# Patient Record
Sex: FEMALE | Race: OTHER | HISPANIC OR LATINO | Employment: FULL TIME | URBAN - METROPOLITAN AREA
[De-identification: names, ages, dates, MRNs, and addresses within clinical notes are randomized per-mention and may not be internally consistent; named-entity substitution may affect disease eponyms.]

---

## 2017-01-12 ENCOUNTER — TELEPHONE (OUTPATIENT)
Dept: NEUROLOGY | Facility: MEDICAL CENTER | Age: 22
End: 2017-01-12

## 2017-01-12 NOTE — TELEPHONE ENCOUNTER
Pt called and left a voicemail to let us know that she has been fainting at least 4 times a week. I called back but no answer yet.  She has her EMU stay scheduled in March but want to know if you have and suggestions for her until then.     Please Advise    Thanks Nichole SOLOMON

## 2017-01-14 ENCOUNTER — HOSPITAL ENCOUNTER (EMERGENCY)
Facility: MEDICAL CENTER | Age: 22
End: 2017-01-14
Attending: EMERGENCY MEDICINE
Payer: COMMERCIAL

## 2017-01-14 ENCOUNTER — APPOINTMENT (OUTPATIENT)
Dept: RADIOLOGY | Facility: MEDICAL CENTER | Age: 22
End: 2017-01-14
Attending: EMERGENCY MEDICINE
Payer: COMMERCIAL

## 2017-01-14 VITALS
RESPIRATION RATE: 19 BRPM | SYSTOLIC BLOOD PRESSURE: 136 MMHG | BODY MASS INDEX: 39.96 KG/M2 | WEIGHT: 198.19 LBS | HEART RATE: 73 BPM | DIASTOLIC BLOOD PRESSURE: 94 MMHG | HEIGHT: 59 IN | TEMPERATURE: 98 F | OXYGEN SATURATION: 95 %

## 2017-01-14 DIAGNOSIS — R07.9 CHEST PAIN, UNSPECIFIED TYPE: ICD-10-CM

## 2017-01-14 PROCEDURE — 71020 DX-CHEST-2 VIEWS: CPT

## 2017-01-14 PROCEDURE — 93005 ELECTROCARDIOGRAM TRACING: CPT | Performed by: EMERGENCY MEDICINE

## 2017-01-14 PROCEDURE — 99284 EMERGENCY DEPT VISIT MOD MDM: CPT

## 2017-01-14 ASSESSMENT — LIFESTYLE VARIABLES: DO YOU DRINK ALCOHOL: YES

## 2017-01-14 NOTE — ED NOTES
Orin White  Chief Complaint   Patient presents with   • Shortness of Breath     started at approx 2100 after panic attack.     • Rib Pain     (R) side with inspiration.     Pt ambulatory to triage with above complaint.  VSS.   Pt returned to lobby, educated on triage process, and to inform staff of any changes or concerns.

## 2017-01-14 NOTE — ED NOTES
Pt given discharge instructions, follow up care. Pt verbalized understanding. RN to answer and questions pt and family had. VSS. Pt ambulated out to front lobby.

## 2017-01-14 NOTE — ED AVS SNAPSHOT
After Visit Summary                                                                                                                Orin White   MRN: [id number]    Department:  [facility], Emergency Dept   Date of Visit:  1/14/2017            [facility], Emergency Dept    [address] [address]    Phone:  [phone]      You were seen by     Alfonso Ramos M.D.      Your Diagnosis Was     Chest pain, unspecified type     R07.9       Follow-up Information     1. Follow up with Reanna Garvin M.D. In 2 days.    Specialty:  Family Medicine    Contact information    [address]  [address]  [facility] [address] [phone]          2. Follow up with [facility], Emergency Dept.    Specialty:  Emergency Medicine    Why:  If symptoms worsen    Contact information    [address] [address] [phone]      Medication Information     Review all of your home medications and newly ordered medications with your primary doctor and/or pharmacist as soon as possible. Follow medication instructions as directed by your doctor and/or pharmacist.     Please keep your complete medication list with you and share with your physician. Update the information when medications are discontinued, doses are changed, or new medications (including over-the-counter products) are added; and carry medication information at all times in the event of emergency situations.               Medication List      ASK your doctor about these medications        Instructions    diazepam 10 MG kit   Commonly known as:  DIASTAT-ACUDIAL    Insert 1 Kit in rectum 1 time daily as needed (for convulsions lasting 5 mins or more.).   Dose:  1 Kit       escitalopram 10 MG Tabs   Commonly known as:  LEXAPRO    Doctor's comments:  Take 1/2 tablet for the first 6 days, then increase to full tablet if tolerating   Take 0.5-1 Tabs by mouth every day.   Dose:  5-10 mg         LamoTRIgine 200 MG Tb24    Take 400 mg by mouth every day.   Dose:  400 mg       levetiracetam 500 MG Tabs   Commonly known as:  KEPPRA    Take 1 Tab by mouth 2 times a day.   Dose:  500 mg       Melatonin 10 MG Tbcr    Take 10 mg PE by mouth every bedtime.   Dose:  10 mg PE               Procedures and tests performed during your visit     DX-CHEST-2 VIEWS    EKG (ER)            Patient Information     Patient Information    Following emergency treatment: all patient requiring follow-up care must return either to a private physician or a clinic if your condition worsens before you are able to obtain further medical attention, please return to the emergency room.     Billing Information    At [facility], we work to make the billing process streamlined for our patients.  Our Representatives are here to answer any questions you may have regarding your hospital bill.  If you have insurance coverage and have supplied your insurance information to us, we will submit a claim to your insurer on your behalf.  Should you have any questions regarding your bill, we can be reached online or by phone as follows:  Online: You are able pay your bills online or live chat with our representatives about any billing questions you may have. We are here to help Monday - Friday from 8:00am to 7:30pm and 9:00am - 12:00pm on Saturdays.  Please visit [email or web address]  for more information.   Phone:  [phone] or [phone]    Please note that your emergency physician, surgeon, pathologist, radiologist, anesthesiologist, and other specialists are not employed by [facility] and will therefore bill separately for their services.  Please contact them directly for any questions concerning their bills at the numbers below:     Emergency Physician Services:  [phone]  [facility]:  [phone]  [facility]:  [phone]  [facility]:   [phone]    1. Your final bill may vary from the amount quoted upon discharge if all procedures are not complete at that time, or if your doctor has additional procedures of which we are not aware. You will receive an additional bill if you return to the Emergency Department at [facility] for suture removal regardless of the facility of which the sutures were placed.     2. Please arrange for settlement of this account at the emergency registration.    3. All self-pay accounts are due in full at the time of treatment.  If you are unable to meet this obligation then payment is expected within 4-5 days.     4. If you have had radiology studies (CT, X-ray, Ultrasound, MRI), you have received a preliminary result during your emergency department visit. Please contact the radiology department [phone] to receive a copy of your final result. Please discuss the Final result with your primary physician or with the follow up physician provided.     Crisis Hotline:  [place] Crisis Hotline:  7-735-ZQCFEAR or 1-235.367.6767  [place] Crisis Hotline:    1-591.691.8433 or 213-206-2113         ED Discharge Follow Up Questions    1. In order to provide you with very good care, we would like to follow up with a phone call in the next few days.  May we have your permission to contact you?     YES /  NO    2. What is the best phone number to call you? (       )_____-__________    3. What is the best time to call you?      Morning  /  Afternoon  /  Evening                   Patient Signature:  ____________________________________________________________    Date:  ____________________________________________________________      Your appointments     Feb 28, 2017  1:40 PM   Follow Up Visit with Mk Butterfield M.D.   [facility] (--)    [address], [address]  [facility] [address]   [phone]           You will be receiving a confirmation call a few days before your appointment from our automated call  confirmation system.

## 2017-01-14 NOTE — ED NOTES
EKG preformed bedside, all results back chart up for re-eval. Pt with no changes from previous assessments.

## 2017-01-14 NOTE — ED NOTES
Chief Complaint   Patient presents with   • Shortness of Breath     started at approx 2100 after panic attack.     • Rib Pain     (R) side with inspiration.     Pt placed on monitors, VSS. Ambulated back to room with no difficulty breathing or distress. Able to talk in full sentences. Pt placed on monitors, Chart up for ERP.

## 2017-01-14 NOTE — ED PROVIDER NOTES
"ED Provider Note        CHIEF COMPLAINT  Chief Complaint   Patient presents with   • Shortness of Breath     started at approx 2100 after panic attack.     • Rib Pain     (R) side with inspiration.       HPI  Orin White is a 21 y.o. female who presents to the Emergency Department with right-sided chest pain: Panic attack. She reports sharp stabbing pain over the right upper area of her chest is worse with deep inspiration. No alleviating or aggravating factors. Pain is currently at a 2 out of 10. Patient states multiple previous events similar fashion after having panic attacks over this event has lasted longer than usual concerning the patient. Patient currently has no shortness of breath she did have some palpitations at the onset. She's had no nausea no diaphoresis she has no abdominal pain . History of dysuria melena hematochezia. She's had no lower extremity pain or swelling. She does not smoke does not use oral contraceptives she does have an IUD in place. No long-term travel or periods of immobilization recently.    REVIEW OF SYSTEMS  10 systems reviewed and otherwise negative, pertinent positives and negatives listed in the history of present illness.       PAST MEDICAL HISTORY   has a past medical history of Seizure (CMS-[facility]); Epilepsy (CMS-[facility]); and Depression.    SURGICAL HISTORY  patient denies any surgical history    SOCIAL HISTORY  Social History   Substance Use Topics   • Smoking status: Never Smoker    • Smokeless tobacco: Never Used   • Alcohol Use: 0.0 oz/week     0 Standard drinks or equivalent per week      Comment: occ.      History   Drug Use No       FAMILY HISTORY  Non-contributory    CURRENT MEDICATIONS  Home Medications     **Home medications have not yet been reviewed for this encounter**          ALLERGIES  No Known Allergies    PHYSICAL EXAM  VITAL SIGNS: /94 mmHg  Pulse 73  Temp(Src) 36.7 °C (98 °F)  Resp 19  Ht 1.499 m (4' 11\")  Wt 89.9 kg (198 lb 3.1 oz)  BMI 40.01 " kg/m2  SpO2 95%  Constitutional: Alert and oriented x 3, no acute distress.  HENT: Normocephalic, Atraumatic, Bilateral external ears normal. Nose normal.   Eyes: Pupils are equal and reactive. Conjunctiva normal, non-icteric.   Heart: Regular rate and rythm, no murmurs, equal pulses peripherally x 4.    Lungs: Clear to auscultation bilaterally, no wheezes rales or rhonchi  GI: Soft nontender nondistended positive bowel sounds.  Skin: Warm, Dry, No erythema, No rash.   Neurologic: Cranial nerves III through XII grossly intact no sensory deficit no cerebellar dysfunction.   Psychiatric: Appropriate affect for situation    Sinus rhythm at a rate of 79 no ST elevation depression .  T-wave inversion in lead 3 minimal flattening of T waves in precordial leads. No acute change from prior examination    RADIOLOGY  DX-CHEST-2 VIEWS   Final Result      1.  Unremarkable two view chest.        The radiologist's interpretation of all radiological studies have been reviewed by me.    COURSE & MEDICAL DECISION MAKING  Nursing notes, VS, PMSFHx reviewed in chart.        Medical Decision Making: EKG does show flipped T-wave in lead 3 however this is improved from previous examination shows some flattening in the precordial leads this is also consistent from previous exam. Chest x-ray unremarkable pain relieved with no interventions. Patient's perk negative she is not hypoxic no tachycardia no pulses C tearing sensation or stigmata of aortic pathology on chest x-ray. Patient given instructions on anti-inflammatories. She'll return for any further chest pain or shortness of breath any other acute concerns otherwise follow-up with primary care. Discharged him stable condition.    Reanna Garvin M.D.  [address]  [address] [place] [address] [phone]    In 2 days      [facility], Emergency Dept  [address] [address] [phone]    If symptoms worsen  '      FINAL IMPRESSION  1.  Chest pain, unspecified type         This dictation has been created using voice recognition software and/or scribes. The accuracy of the dictation is limited by the abilities of the software and the expertise of the scribes. I expect there may be some errors of grammar and possibly content. I made every attempt to manually correct the errors within my dictation. However, errors related to voice recognition software and/or scribes may still exist and should be interpreted within the appropriate context.

## 2017-01-14 NOTE — ED AVS SNAPSHOT
1/14/2017          Orin White  [address]   [place] [address]    Dear Orin:    [facility] wants to ensure your discharge home is safe and you or your loved ones have had all your questions answered regarding your care after you leave the hospital.    You may receive a telephone call within two days of your discharge.  This call is to make certain you understand your discharge instructions as well as ensure we provided you with the best care possible during your stay with us.     The call will only last approximately 3-5 minutes and will be done by a nurse.    Once again, we want to ensure your discharge home is safe and that you have a clear understanding of any next steps in your care.  If you have any questions or concerns, please do not hesitate to contact us, we are here for you.  Thank you for choosing [facility] for your healthcare needs.    Sincerely,    Wisam Dong    [facility]

## 2017-01-14 NOTE — ED AVS SNAPSHOT
[facility] Access Code: Activation code not generated  Current [facility] Status: Active    Mytrushart  A secure, online tool to manage your health information     [employer]’s [facility]® is a secure, online tool that connects you to your personalized health information from the privacy of your home -- day or night - making it very easy for you to manage your healthcare. Once the activation process is completed, you can even access your medical information using the [facility] janina, which is available for free in the Apple Janina store or Google Play store.     [facility] provides the following levels of access (as shown below):   My Chart Features   [facility] Primary Care Doctor [facility]  Specialists [facility]  Urgent  Care Non-[facility]  Primary Care  Doctor   Email your healthcare team securely and privately 24/7 X X X X   Manage appointments: schedule your next appointment; view details of past/upcoming appointments X      Request prescription refills. X      View recent personal medical records, including lab and immunizations X X X X   View health record, including health history, allergies, medications X X X X   Read reports about your outpatient visits, procedures, consult and ER notes X X X X   See your discharge summary, which is a recap of your hospital and/or ER visit that includes your diagnosis, lab results, and care plan. X X       How to register for [facility]:  1. Go to  [email or web address].  2. Click on the Sign Up Now box, which takes you to the New Member Sign Up page. You will need to provide the following information:  a. Enter your [facility] Access Code exactly as it appears at the top of this page. (You will not need to use this code after you’ve completed the sign-up process. If you do not sign up before the expiration date, you must request a new code.)   b. Enter your date of birth.   c. Enter your home email address.   d. Click Submit, and follow the next screen’s instructions.  3. Create a [facility] ID. This will  be your [facility] login ID and cannot be changed, so think of one that is secure and easy to remember.  4. Create a [facility] password. You can change your password at any time.  5. Enter your Password Reset Question and Answer. This can be used at a later time if you forget your password.   6. Enter your e-mail address. This allows you to receive e-mail notifications when new information is available in [facility].  7. Click Sign Up. You can now view your health information.    For assistance activating your [facility] account, call [phone]

## 2017-01-17 ENCOUNTER — TELEPHONE (OUTPATIENT)
Dept: NEUROLOGY | Facility: MEDICAL CENTER | Age: 22
End: 2017-01-17

## 2017-01-17 NOTE — TELEPHONE ENCOUNTER
Pt called and left Vm on 1/17/16. I tried calling back no answer. Pt states she had another fainting spell and wanted to inform us. She is scheduled for EMU on 2/6/17.     Thanks Nichole SOLOMON

## 2017-01-18 LAB — EKG IMPRESSION: NORMAL

## 2017-02-02 ENCOUNTER — TELEPHONE (OUTPATIENT)
Dept: NEUROLOGY | Facility: MEDICAL CENTER | Age: 22
End: 2017-02-02

## 2017-02-02 NOTE — TELEPHONE ENCOUNTER
[facility] approved 1 day for EMU admission #[id number]. Spoke with Orin and confirmed she will be here Monday 2/6/17.

## 2017-02-02 NOTE — PROGRESS NOTES
Direct admit from Dr. Butterfield for EEG monitoring for Epilepsy. ADT orders singed and held 02/02/2017 at 1555 will need to be released upon patient arrival to unit. Orders faxed to neuro surgery and scanned into [facility].

## 2017-02-06 ENCOUNTER — HOSPITAL ENCOUNTER (INPATIENT)
Facility: MEDICAL CENTER | Age: 22
LOS: 4 days | DRG: 101 | End: 2017-02-10
Attending: PSYCHIATRY & NEUROLOGY | Admitting: PSYCHIATRY & NEUROLOGY
Payer: COMMERCIAL

## 2017-02-06 DIAGNOSIS — R55 SYNCOPE AND COLLAPSE: Chronic | ICD-10-CM

## 2017-02-06 PROCEDURE — 700111 HCHG RX REV CODE 636 W/ 250 OVERRIDE (IP): Performed by: PSYCHIATRY & NEUROLOGY

## 2017-02-06 PROCEDURE — 700102 HCHG RX REV CODE 250 W/ 637 OVERRIDE(OP): Performed by: PSYCHIATRY & NEUROLOGY

## 2017-02-06 PROCEDURE — 770020 HCHG ROOM/CARE - TELE (206)

## 2017-02-06 PROCEDURE — 95951 EEG: CPT

## 2017-02-06 PROCEDURE — A9270 NON-COVERED ITEM OR SERVICE: HCPCS | Performed by: PSYCHIATRY & NEUROLOGY

## 2017-02-06 RX ORDER — LEVETIRACETAM 500 MG/1
500 TABLET ORAL 2 TIMES DAILY
Status: DISCONTINUED | OUTPATIENT
Start: 2017-02-06 | End: 2017-02-06

## 2017-02-06 RX ORDER — ESCITALOPRAM OXALATE 10 MG/1
10 TABLET ORAL DAILY
Status: DISCONTINUED | OUTPATIENT
Start: 2017-02-06 | End: 2017-02-10 | Stop reason: HOSPADM

## 2017-02-06 RX ORDER — SAW/PYGEUM/BETA/HERB/D3/B6/ZN 30 MG-25MG
10 CAPSULE ORAL
Status: DISCONTINUED | OUTPATIENT
Start: 2017-02-06 | End: 2017-02-06

## 2017-02-06 RX ORDER — IBUPROFEN 200 MG
400 TABLET ORAL EVERY 6 HOURS PRN
COMMUNITY
End: 2018-07-10

## 2017-02-06 RX ORDER — AMOXICILLIN 500 MG/1
500 CAPSULE ORAL 2 TIMES DAILY
Status: ON HOLD | COMMUNITY
End: 2017-02-10

## 2017-02-06 RX ORDER — LAMOTRIGINE 100 MG/1
200 TABLET ORAL 2 TIMES DAILY
Status: DISCONTINUED | OUTPATIENT
Start: 2017-02-06 | End: 2017-02-10 | Stop reason: HOSPADM

## 2017-02-06 RX ORDER — ESCITALOPRAM OXALATE 10 MG/1
10 TABLET ORAL DAILY
COMMUNITY
End: 2018-07-10

## 2017-02-06 RX ORDER — LORAZEPAM 2 MG/ML
1 INJECTION INTRAMUSCULAR
Status: DISCONTINUED | OUTPATIENT
Start: 2017-02-06 | End: 2017-02-10 | Stop reason: HOSPADM

## 2017-02-06 RX ORDER — LAMOTRIGINE 200 MG/1
400 TABLET, EXTENDED RELEASE ORAL DAILY
Status: DISCONTINUED | OUTPATIENT
Start: 2017-02-06 | End: 2017-02-06

## 2017-02-06 RX ADMIN — LAMOTRIGINE 200 MG: 100 TABLET ORAL at 20:46

## 2017-02-06 RX ADMIN — ESCITALOPRAM OXALATE 10 MG: 10 TABLET ORAL at 09:40

## 2017-02-06 RX ADMIN — LAMOTRIGINE 200 MG: 100 TABLET ORAL at 09:40

## 2017-02-06 RX ADMIN — ENOXAPARIN SODIUM 40 MG: 100 INJECTION SUBCUTANEOUS at 09:40

## 2017-02-06 ASSESSMENT — PAIN SCALES - GENERAL
PAINLEVEL_OUTOF10: 0
PAINLEVEL_OUTOF10: 0

## 2017-02-06 ASSESSMENT — LIFESTYLE VARIABLES
CONSUMPTION TOTAL: NEGATIVE
EVER HAD A DRINK FIRST THING IN THE MORNING TO STEADY YOUR NERVES TO GET RID OF A HANGOVER: NO
EVER FELT BAD OR GUILTY ABOUT YOUR DRINKING: NO
HAVE PEOPLE ANNOYED YOU BY CRITICIZING YOUR DRINKING: NO
AVERAGE NUMBER OF DAYS PER WEEK YOU HAVE A DRINK CONTAINING ALCOHOL: 1
ON A TYPICAL DAY WHEN YOU DRINK ALCOHOL HOW MANY DRINKS DO YOU HAVE: 1
HOW MANY TIMES IN THE PAST YEAR HAVE YOU HAD 5 OR MORE DRINKS IN A DAY: 0
HAVE YOU EVER FELT YOU SHOULD CUT DOWN ON YOUR DRINKING: NO
TOTAL SCORE: 0
ALCOHOL_USE: YES
TOTAL SCORE: 0
EVER_SMOKED: NEVER
TOTAL SCORE: 0

## 2017-02-06 ASSESSMENT — PATIENT HEALTH QUESTIONNAIRE - PHQ9
1. LITTLE INTEREST OR PLEASURE IN DOING THINGS: NOT AT ALL
SUM OF ALL RESPONSES TO PHQ QUESTIONS 1-9: 0
SUM OF ALL RESPONSES TO PHQ9 QUESTIONS 1 AND 2: 0
2. FEELING DOWN, DEPRESSED, IRRITABLE, OR HOPELESS: NOT AT ALL

## 2017-02-06 NOTE — PROGRESS NOTES
Attention seizure pads to pt's bed rails. Neuro sciences unit will need to provide from there own supply as we do not carry this item.

## 2017-02-06 NOTE — IP AVS SNAPSHOT
" Home Care Instructions                                                                                                                  Name:Orin White  Medical Record Number:[id number]  CSN: [id number]    YOB: 1995   Age: 21 y.o.  Sex: female  HT:1.499 m (4' 11\") WT: 86.183 kg (190 lb)          Admit Date: 2/6/2017     Discharge Date:   Today's Date: 2/10/2017  Attending Doctor:  Mk Butterfield M.D.                  Allergies:  Review of patient's allergies indicates no known allergies.            Discharge Instructions       Discharge Instructions    Discharged to home by car with relative. Discharged via walking, hospital escort: Refused.  Special equipment needed: Not Applicable    Be sure to schedule a follow-up appointment with your primary care doctor or any specialists as instructed.     Discharge Plan:   Diet Plan: Discussed  Activity Level: Discussed  Confirmed Follow up Appointment: Patient to Call and Schedule Appointment  Confirmed Symptoms Management: Discussed  Medication Reconciliation Updated: Yes  Influenza Vaccine Indication: Patient Refuses    I understand that a diet low in cholesterol, fat, and sodium is recommended for good health. Unless I have been given specific instructions below for another diet, I accept this instruction as my diet prescription.   Other diet: Regular    Special Instructions: None    · Is patient discharged on Warfarin / Coumadin?   No     · Is patient Post Blood Transfusion?  No    Depression / Suicide Risk    As you are discharged from this [facility] facility, it is important to learn how to keep safe from harming yourself.    Recognize the warning signs:  · Abrupt changes in personality, positive or negative- including increase in energy   · Giving away possessions  · Change in eating patterns- significant weight changes-  positive or negative  · Change in sleeping patterns- unable to sleep or sleeping all the time   · Unwillingness or inability to " communicate  · Depression  · Unusual sadness, discouragement and loneliness  · Talk of wanting to die  · Neglect of personal appearance   · Rebelliousness- reckless behavior  · Withdrawal from people/activities they love  · Confusion- inability to concentrate     If you or a loved one observes any of these behaviors or has concerns about self-harm, here's what you can do:  · Talk about it- your feelings and reasons for harming yourself  · Remove any means that you might use to hurt yourself (examples: pills, rope, extension cords, firearm)  · Get professional help from the community (Mental Health, Substance Abuse, psychological counseling)  · Do not be alone:Call your Safe Contact- someone whom you trust who will be there for you.  · Call your local CRISIS HOTLINE 016-5436 or 844-557-4781  · Call your local Children's Mobile Crisis Response Team [place] (209) 279-3900 or www.[employer]ch  · Call the toll free National Suicide Prevention Hotlines   · National Suicide Prevention Lifeline 250-832-XVVY (6277)  · [employer] Line Network 800-SUICIDE (990-5047)        Your appointments     Feb 28, 2017  1:40 PM   Follow Up Visit with Mk Butterfield M.D.   [facility] (--)    [address], [address]  [facility] [address] [phone]           You will be receiving a confirmation call a few days before your appointment from our automated call confirmation system.                 Discharge Medication Instructions:    Below are the medications your physician expects you to take upon discharge:    Review all your home medications and newly ordered medications with your doctor and/or pharmacist. Follow medication instructions as directed by your doctor and/or pharmacist.    Please keep your medication list with you and share with your physician.               Medication List      START taking these medications        Instructions    gabapentin 300 MG Caps   Last time this was given:  300 mg on  2/9/2017  7:45 PM   Commonly known as:  NEURONTIN   Next Dose Due:  Friday, February 10, 2017 PM    Take 1 Cap by mouth every evening.   Dose:  300 mg       vitamin D (Ergocalciferol) 05539 UNITS Caps capsule   Last time this was given:  50,000 Units on 2/9/2017  9:24 AM   Commonly known as:  DRISDOL   Next Dose Due:  Thursday, February 17, 2017    Take 1 Cap by mouth every 7 days.   Dose:  52842 Units         CONTINUE taking these medications        Instructions    escitalopram 10 MG Tabs   Last time this was given:  10 mg on 2/10/2017 10:11 AM   Commonly known as:  LEXAPRO   Next Dose Due:  Saturday, February 11, 2017 AM    Take 10 mg by mouth every day.   Dose:  10 mg       ibuprofen 200 MG Tabs   Commonly known as:  MOTRIN    Take 400 mg by mouth every 6 hours as needed for Mild Pain.   Dose:  400 mg       LamoTRIgine 200 MG Tb24   Next Dose Due:  Saturday, February 11, 2017 AM    Take 400 mg by mouth every day.   Dose:  400 mg       Melatonin 10 MG Tbcr    Take 10 mg PE by mouth every bedtime.   Dose:  10 mg PE         STOP taking these medications     amoxicillin 500 MG Caps   Commonly known as:  AMOXIL       diazepam 10 MG kit   Commonly known as:  DIASTAT-ACUDIAL       levetiracetam 500 MG Tabs   Commonly known as:  KEPPRA               Instructions           Diet / Nutrition:    Follow any diet instructions given to you by your doctor or the dietician, including how much salt (sodium) you are allowed each day.    If you are overweight, talk to your doctor about a weight reduction plan.    Activity:    Remain physically active following your doctor's instructions about exercise and activity.    Rest often.     Any time you become even a little tired or short of breath, SIT DOWN and rest.    Worsening Symptoms:    Report any of the following signs and symptoms to the doctor's office immediately:    *Pain of jaw, arm, or neck  *Chest pain not relieved by medication                               *Dizziness or  loss of consciousness  *Difficulty breathing even when at rest   *More tired than usual                                       *Bleeding drainage or swelling of surgical site  *Swelling of feet, ankles, legs or stomach                 *Fever (>100ºF)  *Pink or blood tinged sputum  *Weight gain (3lbs/day or 5lbs /week)           *Shock from internal defibrillator (if applicable)  *Palpitations or irregular heartbeats                *Cool and/or numb extremities    Stroke Awareness    Common Risk Factors for Stroke include:    Age  Atrial Fibrillation  Carotid Artery Stenosis  Diabetes Mellitus  Excessive alcohol consumption  High blood pressure  Overweight   Physical inactivity  Smoking    Warning signs and symptoms of a stroke include:    *Sudden numbness or weakness of the face, arm or leg (especially on one side of the body).  *Sudden confusion, trouble speaking or understanding.  *Sudden trouble seeing in one or both eyes.  *Sudden trouble walking, dizziness, loss of balance or coordination.Sudden severe headache with no known cause.    It is very important to get treatment quickly when a stroke occurs. If you experience any of the above warning signs, call 911 immediately.                   Disclaimer         Quit Smoking / Tobacco Use:    I understand the use of any tobacco products increases my chance of suffering from future heart disease or stroke and could cause other illnesses which may shorten my life. Quitting the use of tobacco products is the single most important thing I can do to improve my health. For further information on smoking / tobacco cessation call a Toll Free Quit Line at 1-184.584.1508 (*National Cancer [place]) or 1-395.474.3970 (American Lung Association) or you can access the web based program at www.lungusa.org.    [place] Tobacco Users Help Line:  (284) 795-3494       Toll Free: 1-756.292.9666  Quit Tobacco Program [facility] ([phone]    Crisis  Hotline:    National Crisis Hotline:  8-684-AXVLESP or 1-574.864.6598    [place] Crisis Hotline:    1-904.383.7190 or 352-728-8281    Discharge Survey:   Thank you for choosing [facility]. We hope we did everything we could to make your hospital stay a pleasant one. You may be receiving a phone survey and we would appreciate your time and participation in answering the questions. Your input is very valuable to us in our efforts to improve our service to our patients and their families.        My signature on this form indicates that:    1. I have reviewed and understand the above information.  2. My questions regarding this information have been answered to my satisfaction.  3. I have formulated a plan with my discharge nurse to obtain my prescribed medications for home.                  Disclaimer         __________________________________                     __________       ________                       Patient Signature                                                 Date                    Time

## 2017-02-06 NOTE — EEG PROGRESS NOTE
VIDEO ELECTROENCEPHALOGRAM / EPILEPSY MONITORING UNIT REPORT        DOS:   2/6/2017 - 2/10/2017      INDICATION:  Orin White 21 y.o. female presenting with recurrent seizures.     CURRENT ANTIEPILEPTIC REGIMEN: Lamotrigine  mg po daily, Levetiracetam 500 mg po bid.      TECHNIQUE: A 30-channel, 5 days video electroencephalogram (VEEG) was performed in accordance with the international 10-20 system. This digital study was reviewed in bipolar and referential montages. The recording examined the patient during wakeful, drowsy and sleep states.     The patient was self-sleep deprived to 4 hrs or less of sleep: every night during her admission.     There was supervised withdrawal of the following medications: Levetiracetam was held during her admission and ultimately discontinued.       DESCRIPTION OF THE RECORD:  During the awake state, background shows symmetrical 10 Hz alpha activity posteriorly with amplitude of 70 mV.  There was reactivity with eye opening/closure.  Normal anterior-posterior gradient was noted with faster beta frequencies seen anteriorly.  During drowsiness, high-amplitude delta frequencies were seen.    During the sleep state, background shows diffuse high-amplitude 4-5 Hz delta activity.  Symmetrical high-amplitude sleep spindles and vertex sharp activities were seen in the central leads.    ACTIVATION PROCEDURES:   Hyperventilation was performed by the patient for a total of 3 minutes. The technician performing the test noted good effort. This technique produced electroencephalographic changes in keeping with mild bilaterally synchronous, frontally predominan build up.     Intermittent Photic stimulation was performed in a stepwise fashion from 1 to 30 Hz and elicited a normal response (photic driving), most noticeable in the posterior leads.      ICTAL AND/OR INTERICTAL FINDINGS:   No focal or generalized epileptiform activity was noted. No regional slowing was seen during this  routine study.  No seizures were recording.     EVENT(S):  No clinical events.     EKG: sampling review of EKG recording demonstrated sinus rhythm.       INTERPRETATION:   This is a normal 5 days video electroencephalogram recording in the awake, drowsy and sleep state.  No events were captured during the study. Clinical correlation is recommended.    Note: A normal electroencephalogram does not rule out epilepsy.       Mk Butterfield MD  Diplomate in Neurology, Epilepsy, and Electrodiagnostic Medicine.

## 2017-02-06 NOTE — IP AVS SNAPSHOT
Loopback Access Code: Activation code not generated  Current Loopback Status: Active    [email or web address]  A secure, online tool to manage your health information     [employer]’s Loopback® is a secure, online tool that connects you to your personalized health information from the privacy of your home -- day or night - making it very easy for you to manage your healthcare. Once the activation process is completed, you can even access your medical information using the Loopback janina, which is available for free in the Apple Janina store or Google Play store.     Loopback provides the following levels of access (as shown below):   My Chart Features   [facility] Primary Care Doctor [facility]  Specialists [facility]  Urgent  Care Non-[facility]  Primary Care  Doctor   Email your healthcare team securely and privately 24/7 X X X X   Manage appointments: schedule your next appointment; view details of past/upcoming appointments X      Request prescription refills. X      View recent personal medical records, including lab and immunizations X X X X   View health record, including health history, allergies, medications X X X X   Read reports about your outpatient visits, procedures, consult and ER notes X X X X   See your discharge summary, which is a recap of your hospital and/or ER visit that includes your diagnosis, lab results, and care plan. X X       How to register for Loopback:  1. Go to  [email or web address].  2. Click on the Sign Up Now box, which takes you to the New Member Sign Up page. You will need to provide the following information:  a. Enter your Loopback Access Code exactly as it appears at the top of this page. (You will not need to use this code after you’ve completed the sign-up process. If you do not sign up before the expiration date, you must request a new code.)   b. Enter your date of birth.   c. Enter your home email address.   d. Click Submit, and follow the next screen’s instructions.  3. Create a Loopback ID. This will  be your LinkStorm login ID and cannot be changed, so think of one that is secure and easy to remember.  4. Create a LinkStorm password. You can change your password at any time.  5. Enter your Password Reset Question and Answer. This can be used at a later time if you forget your password.   6. Enter your e-mail address. This allows you to receive e-mail notifications when new information is available in LinkStorm.  7. Click Sign Up. You can now view your health information.    For assistance activating your LinkStorm account, call [phone]

## 2017-02-06 NOTE — IP AVS SNAPSHOT
" <p align=\"LEFT\"><IMG SRC=\"//[email or web address]$/[email or web address]\" alt=\"Image\" WIDTH=\"50%\" HEIGHT=\"200\" BORDER=\"\"></p>                   Name:Orin White  Medical Record Number:[id number]  CSN: [id number]    YOB: 1995   Age: 21 y.o.  Sex: female  HT:1.499 m (4' 11\") WT: 86.183 kg (190 lb)          Admit Date: 2/6/2017     Discharge Date:   Today's Date: 2/10/2017  Attending Doctor:  Mk Butterfield M.D.                  Allergies:  Review of patient's allergies indicates no known allergies.          Your appointments     Feb 28, 2017  1:40 PM   Follow Up Visit with Mk Butterfield M.D.   [facility] (--)    [address] [place] [address], [address]  [facility] [address] [phone]           You will be receiving a confirmation call a few days before your appointment from our automated call confirmation system.                 Medication List      Take these Medications        Instructions    escitalopram 10 MG Tabs   Commonly known as:  LEXAPRO    Take 10 mg by mouth every day.   Dose:  10 mg       gabapentin 300 MG Caps   Commonly known as:  NEURONTIN    Take 1 Cap by mouth every evening.   Dose:  300 mg       ibuprofen 200 MG Tabs   Commonly known as:  MOTRIN    Take 400 mg by mouth every 6 hours as needed for Mild Pain.   Dose:  400 mg       LamoTRIgine 200 MG Tb24    Take 400 mg by mouth every day.   Dose:  400 mg       Melatonin 10 MG Tbcr    Take 10 mg PE by mouth every bedtime.   Dose:  10 mg PE       vitamin D (Ergocalciferol) 75534 UNITS Caps capsule   Commonly known as:  DRISDOL    Take 1 Cap by mouth every 7 days.   Dose:  24687 Units         "

## 2017-02-06 NOTE — PROGRESS NOTES
Pt aaox4. , tele monitor, continuous EEG in use. Up w/ SBA, steady gait to BR, voiding w/o difficulty. Pt denies pain. Good appetite. Reviewed poc with pt-verbalized understanding. Bed alarm in use. Call light in reach. Seizure precautions in place.

## 2017-02-06 NOTE — PROCEDURES
VIDEO ELECTROENCEPHALOGRAM / EPILEPSY MONITORING UNIT REPORT        DOS:   2/6/2017 - 2/10/2017    INDICATION:  Orin White 21 y.o. female presenting with recurrent seizures.     CURRENT ANTIEPILEPTIC REGIMEN: Lamotrigine  mg po daily, Levetiracetam 500 mg po bid.       TECHNIQUE: A 30-channel, 5 days video electroencephalogram (VEEG) was performed in accordance with the international 10-20 system. This digital study was reviewed in bipolar and referential montages. The recording examined the patient during wakeful, drowsy and sleep states.     The patient was self-sleep deprived to 4 hrs or less of sleep: every night during her admission.     There was supervised withdrawal of the following medications: Levetiracetam was held during her admission and ultimately discontinued.       DESCRIPTION OF THE RECORD:  During the awake state, background shows symmetrical 10 Hz alpha activity posteriorly with amplitude of 70 mV.  There was reactivity with eye opening/closure.  Normal anterior-posterior gradient was noted with faster beta frequencies seen anteriorly.  During drowsiness, high-amplitude delta frequencies were seen.    During the sleep state, background shows diffuse high-amplitude 4-5 Hz delta activity.  Symmetrical high-amplitude sleep spindles and vertex sharp activities were seen in the central leads.    ACTIVATION PROCEDURES:    Hyperventilation was performed by the patient for a total of 3 minutes. The technician performing the test noted good effort. This technique produced electroencephalographic changes in keeping with mild bilaterally synchronous, frontally predominan build up.     Intermittent Photic stimulation was performed in a stepwise fashion from 1 to 30 Hz and elicited a normal response (photic driving), most noticeable in the posterior leads.      ICTAL AND/OR INTERICTAL FINDINGS:    No focal or generalized epileptiform activity was noted. No regional slowing was seen during this  routine study.  No seizures were recording.      EVENT(S):  No clinical events.     EKG: sampling review of EKG recording demonstrated sinus rhythm.       INTERPRETATION:   This is a normal 5 days video electroencephalogram recording in the awake, drowsy and sleep state.  No events were captured during the study, despite sleep deprivation and holding of levetiracetam throughout her admission. Clinical correlation is recommended.    Note: A normal electroencephalogram does not rule out epilepsy.       Mk Butterfield MD  Diplomate in Neurology, Epilepsy, and Electrodiagnostic Medicine.     RAFAELA / JODY    DD:  02/06/2017 06:56:31  DT:  02/06/2017 09:15:16    D#:  [id number]  Job#:  [id number]

## 2017-02-06 NOTE — DIETARY
Nutrition note  Patient with good appetite and po intake BMi is 38   Weight loss would be of benefit for patient   Out patient education on healthy weight loss program may benefit patient   RD will continue to follow as needed

## 2017-02-06 NOTE — CARE PLAN
Problem: Safety  Goal: Will remain free from falls  Intervention: Implement fall precautions  Bed alarm in use. Call light w/in reach. Instructed pt to call for assistance.      Problem: Knowledge Deficit  Goal: Knowledge of the prescribed therapeutic regimen will improve  Intervention: Discuss information regarding therpeutic regimen and document in education  POC discussed with pt- verbalized understanding.

## 2017-02-06 NOTE — PROGRESS NOTES
Med rec complete per patient.  Patient completed a 7 day course of amoxicillin > 1 week ago.  Allergies reviewed.

## 2017-02-06 NOTE — IP AVS SNAPSHOT
2/10/2017          Orin White  [address]   [place] [address]    Dear Orin:    [facility] wants to ensure your discharge home is safe and you or your loved ones have had all your questions answered regarding your care after you leave the hospital.    You may receive a telephone call within two days of your discharge.  This call is to make certain you understand your discharge instructions as well as ensure we provided you with the best care possible during your stay with us.     The call will only last approximately 3-5 minutes and will be done by a nurse.    Once again, we want to ensure your discharge home is safe and that you have a clear understanding of any next steps in your care.  If you have any questions or concerns, please do not hesitate to contact us, we are here for you.  Thank you for choosing [facility] for your healthcare needs.    Sincerely,    Wisam Dong    [facility]

## 2017-02-06 NOTE — H&P
Cc: Seizures.         History of present illness:  Orin White 21 y.o. female presented to my office for evaluation of seizures. Out of the blue, she started having seizures in October of 2015. She has had at least 10 GTC seizures. Seizures are preceded by an abnormal but pleasant feeling of slow motion and distorted reality. She has two types of events, one where she may be only confused for a min or so and one that progresses to a GTC seizure. These have been witnessed by family. Eyes rolled back, bites her tongue, generalized jerks, unresponsive. Convulsions may last sometimes for up to 5 mins. She is post ictal after her seizures for about 10-15 mins with a slow progressive recovery.     She denies h/o TBI, stroke, MS or childhood seizures or epilepsy.     She has amenorrhea for the last two years and was checked for POS and was inconclusive. She does not use protection with her fiance but does not necessarily want to become pregnant. Seizures not necessarily around where her period would have fallen.     After her first seizure, she was started on Keppra 500 mg po bid and remains with same dose, despite having had more seizures. Her last seizures were July 8, 2016, had two seizures in one day and was seen in ED. Since started on Keppra, has been anxious and somewhat battling depression. Was subsequently placed on Lexapro. There is a family h/o depression, she would like to come off lexapro if possible.     Does not use drugs or drinks alcohol. Denies sleep deprivation.     Her last reported breakthrough seizure was2 weeks ago. She felt lightheaded, spaced out, collapsed backwards, was witnessed by her father, who told her she had a convulsion. She bit her tongue but did not have incontinence, was very tired and confused.     She is not driving.     We is currently on lamictal  mg daily in am, she is also on keppra 500 mg po bid.     Mood is improved with lamictal, denies being depressed.     She is  scheduled with her gyn to have a bare metal IUD implanted soon.       Seizure onset: 2015    Seizure semiology: Aura, confusion, GTC.      Seizure types: Focal onset, progression to GTC.      Past AED’s: None.      Current AED regimen: Keppra 500 mg po bid, lamictal  mg po daily in am.      Prior neurologists: no.      Prior EEG’s: yes.     Prior brain imaging: yes.       Driving status: not driving.      Past medical history:   Past Medical History   Diagnosis Date   • Seizure (CMS-HCC)      since October   • Epilepsy (CMS-HCC)    • Depression        Past surgical history:   No past surgical history on file.    Family history:   Family History   Problem Relation Age of Onset   • Diabetes Maternal Grandmother        Social history:   Social History     Social History   • Marital Status: Single     Spouse Name: N/A   • Number of Children: N/A   • Years of Education: N/A     Occupational History   • Not on file.     Social History Main Topics   • Smoking status: Never Smoker    • Smokeless tobacco: Never Used   • Alcohol Use: 0.0 oz/week     0 Standard drinks or equivalent per week      Comment: occ.   • Drug Use: No   • Sexual Activity: Not Currently     Other Topics Concern   • Not on file     Social History Narrative    ** Merged History Encounter **         [employer] -senior    Lives with Mom, sisters, brother               Current medications:   Current Facility-Administered Medications   Medication Dose   • escitalopram (LEXAPRO) tablet 5-10 mg  5-10 mg   • LamoTRIgine TB24 400 mg  400 mg   • levetiracetam (KEPPRA) tablet 500 mg  500 mg   • Melatonin TBCR 10 mg PE  10 mg PE   • lorazepam (ATIVAN) injection 1 mg  1 mg       Medication Allergy:  No Known Allergies    Review of systems:   Constitutional: denies fever, night sweats, weight loss, or malaise/fatigue.    Eyes: denies acute vision change, eye pain or secretion.    Ears, Nose, Mouth, Throat: denies nasal secretion, nasal bleeding, difficulty  "swallowing, hearing loss, tinnitus, vertigo, ear pain, oral ulcers or lesions.    Endocrine: denies recent weight changes, heat or cold intolerance, polyuria, polydypsia, polyphagia,abnormal hair growth.  Cardiovascular: denies new onset of chest pain, palpitations, syncope, lower extremity edema, or dyspnea of exertion.  Pulmonary: denies shortness of breath, new onset of cough, hemoptysis, wheezing, chest pain or flu-like symptoms.    GI: denies nausea, vomiting, diarrhea, GI bleeding, change in appetite, abdominal pain, and change in bowel habits.  : denies urinary incontinence, retention or hematuria.  Heme/oncology: denies history of easy bruising or bleeding. No history of cancer.    Allergy/immunology: denies hives/urticarial.  Dermatologic: denies new rash.  Musculoskeletal:denies joint swelling or pain, muscle pain, neck and back pain.    Neurologic: denies headaches, acute visual changes, facial droopiness, muscle weakness (focal or generalized), paresthesias, anesthesia, ataxia, change in speech or language, memory loss.  Psychiatric: denies symptoms of worsening depression, hallucinations, mood swings or changes, suicidal or homicidal thoughts.     Physical examination:   Filed Vitals:    02/06/17 0633   BP: 114/65   Temp: 36.4 °C (97.5 °F)   Resp: 18   Height: 1.499 m (4' 11\")   Weight: 86.183 kg (190 lb)     General: Patient in no acute distress, pleasant and cooperative. Overweight.    HEENT: Normocephalic, no signs of acute trauma.    Neck: supple, no meningeal signs or carotid bruits. There is normal range of motion. No tenderness on exam.    Chest: clear to auscultation. No cough.    CV: RRR, no murmurs.    Skin: no signs of acute rashes or trauma.    Musculoskeletal: joints exhibit full range of motion, without any pain to palpation. There are no signs of joint or muscle swelling. There is no tenderness to deep palpation of muscles.    Psychiatric: No hallucinatory behavior. Denies symptoms of " depression or suicidal ideation. Mood and affect appear normal on exam.     NEUROLOGICAL EXAM:    Mental status, orientation: Awake, alert and fully oriented.    Speech and language: speech is clear and fluent. The patient is able to name, repeat and comprehend.    Memory: There is intact recollection of recent and remote events.    Cranial nerve exam: Pupils are 3-4 mm bilaterally and equally reactive to light and accommodation. Visual fields are intact by confrontation. There is no nystagmus on primary or secondary gaze. Intact full EOM in all directions of gaze. Face appears symmetric. Sensation in the face is intact to light touch. Uvula is midline. Palate elevates symmetrically. Tongue is midline and without any signs of tongue biting or fasciculations. Sternocleidomastoid muscles exhibit is normal strength bilaterally. Shoulder shrug is intact bilaterally.    Motor exam: Strength is 5/5 in all extremities. Tone is normal. No abnormal movements were seen on exam.    Sensory exam reveals normal sense of light touch in all extremities.    Deep tendon reflexes:  2+ throughout. Plantar responses are flexor. There is no clonus.    Coordination: shows a normal finger-nose-finger. Normal rapidly alternating movements.    Gait: The patient was able to get up from seated position on first attempt without requiring assistance. Found to be steady when walking. Movements were fluid with normal arm swing. The patient was able to turn without difficulties or tendency to fall. Romberg exam unremarkable.       ANCILLARY DATA REVIEWED:     Lab Data Review:  No results found for this or any previous visit (from the past 24 hour(s)).    Imaging:    [facility] 11/18/15      No evidence of acute intracranial process.                   MRI brain w/wo, dung protocol, 10/4/16:  1. Agenesis of the septum pellucidum.  2. No evidence of mass lesion, heterotopic gray matter, gross cortical dysplasia or mesial temporal sclerosis.  3. There are rare  scattered punctate areas of of increased T2 signal intensity in the periventricular and subcortical white matter consistent with areas of chronic microvascular ischemic change, demyelination, or gliosis.  (Personally reviewed images and shown to pt, aware since younger about agenesis of septum).      EEG, 24 hrs amb EEG, 11/22/16:  This is a normal 24 hours ambulatory electroencephalogram recording in the awake, drowsy and sleep state.  No events were captured during the study. Clinical correlation is recommended.  Note: A normal electroencephalogram does not rule out epilepsy.         ASSESSMENT AND PLAN:    1. Localization-related epilepsy (HCC)  - REFERRAL TO NEURODIAGNOSTICS (EEG,EP,EMG/NCS/DBS) Modality Requested: EEG-Video  - LAMOTRIGINE; Future  - KEPPRA; Future  - VITAMIN D,25 HYDROXY; Future  - CBC WITH DIFFERENTIAL; Future  - COMP METABOLIC PANEL; Future    2. Insomnia, unspecified type  - Melatonin 10 MG Tab CR; Take 10 mg PE by mouth every bedtime.  Dispense: 30 Tab; Refill: 11    3. Abnormal finding on MRI of brain    CLINICAL DISCUSSION:  Side effects of anxiety and depression (not suicidal) on keppra, these are now improved since being on lamictal. Mood is now good.    Increase lamictal  mg tabs, take two po daily in am.    Continue with Keppra while titrating lamictal, will discuss possibly starting a taper at next few appointments, if she remains sz free.    Given normal 24 hrs amb EEG, needs EMU admission for 5 days to characterize her spells.    Agenesis of septum pellucidum on MRI brain, mild WM changes.  Needs blood work, was hyponatremic after seizure in the past. Will check keppra and lamictal level as well as cbc, cmp, and vitamin d.     Needs contraception, recommend IUD (bare metal). Extensive conversation about risk of epilepsy (particurly not well controlled) with pregnancy and also fetal complications related to epilepsy and AED's. Lamictal may be the safest. They agree she is not  ready for pregnancy, understands we must plan for a minimum of six months before. Planning for bare metal IUD.    Insomnia may be a side effect of lamictal, melatonin 10 mg bilayer, take two hrs prior to bedtime is helping.    Continue to refrain from driving for now.     Nature of EMU admission discussed.   Fall precautions discussed, should not get out of bed by herself.  Seizure precautions.   She agrees to hold keppra today, continue with lamictal unchanged. Aware of risk of having further seizures and their consequences.   Agrees with lovenox for DVT prophylaxis.   Start VEEG.       FOLLOW-UP:    After EMU.       EDUCATION AND COUNSELING:  -Education was provided to the patient and/or family regarding diagnosis and prognosis. The chronic and unpredictable nature of the condition were discussed. There is increased risk for additional events, which may carry potential for significant injuries and death.    -We reviewed the current antiepileptic regimen. Potential side effects of antiepileptics were discussed at length, including but no limited to: hypersensitivity reactions (rash and others, some of which can be fatal), visual field changes (some of which may be irreversible), glaucoma, diplopia, kidney stones, osteopenia/osteoporosis/bone fractures, hyperthermia/anhydrosis, tremors/abnormal movements, ataxia, dizziness, fatigue, increased risk for falls, cardiac arrhythmias/syncope, gastrointestinal side effects(hepatitis, pancreatitis, gastritis, ulcers), gingival hypertrophy/bleeding, drowsiness, sedation, anxiety/nervousness, increased risk for suicide, increased risk for depression, and psychosis.   -We reviewed drug-drug interactions and their potential effect on seizure control and medication side effects.    -We also reviewed potential effects of seizures, epilepsy, and medications during pregnancy, including but not limited to fetal malformations, child developmental/intellectual disability,  fetal/ risk for hemorrhages, stillbirth, maternal death, premature birth and others. The patient/family aware that pregnancy should be avoided, unless desired, in which case we recommend discussing with us at least a year prior to planned conception. To avoid undesired pregnancy while on antiepileptics, we recommend dual contraception.    -Folic acid 2 mg is recommended for all females in childbearing age (12-44 years of age).    -Recommend vitamin D supplementation.   -Patient/family educated on risk for SUDEP (Sudden Death in Epilepsy). Counseling was provided on the importance of strict medication and follow up compliance. The patient/family understand the risks associated with non-adherence with the medical plan as outlined, including but not limited to an increased risk for breakthrough seizures, which may contribute to injuries, disability, status epilepticus, and even death.    -Counseling was also provided on potential effects of alcohol and other drugs, which may lower seizure threshold and/or affect the metabolism of antiepileptic drugs. We recommend avoidance of alcohol and illegal drugs.  -We extensively discussed the aspects related to safety in drivers who suffer from epilepsy. The patient is encourage to report to the Division of Motor Vehicles of any condition and/or spells related to confusion, disorientation, and/or loss of awareness and/or loss of consciousness; as these may pose a safety issue if they occur while operating a motor vehicle. The patient and/or family are ultimately responsible for exercising caution and abiding to regulations in place.    -Other seizure precautions were discussed at length, including no diving, no skydiving, no climbing or exposure to unprotected heights, no unsupervised swimming, no Jacuzzi or bathing in bathtubs or deep bodies of water.     Patient agrees with plan, as outlined.        Mk Butterfield MD  Director Adult Epilepsy Program at [facility].  [employer]  in Neurology, Epilepsy, and Electrodiagnostic Medicine.  Clinical Asssistant Professor of Neurology, [employer].

## 2017-02-07 PROCEDURE — A9270 NON-COVERED ITEM OR SERVICE: HCPCS | Performed by: PSYCHIATRY & NEUROLOGY

## 2017-02-07 PROCEDURE — 700111 HCHG RX REV CODE 636 W/ 250 OVERRIDE (IP): Performed by: PSYCHIATRY & NEUROLOGY

## 2017-02-07 PROCEDURE — 95951 HCHG EEG-VIDEO-24HR: CPT

## 2017-02-07 PROCEDURE — 700102 HCHG RX REV CODE 250 W/ 637 OVERRIDE(OP): Performed by: PSYCHIATRY & NEUROLOGY

## 2017-02-07 PROCEDURE — 770020 HCHG ROOM/CARE - TELE (206)

## 2017-02-07 RX ORDER — DIPHENHYDRAMINE HYDROCHLORIDE 50 MG/ML
25 INJECTION INTRAMUSCULAR; INTRAVENOUS EVERY 6 HOURS PRN
Status: DISCONTINUED | OUTPATIENT
Start: 2017-02-07 | End: 2017-02-10 | Stop reason: HOSPADM

## 2017-02-07 RX ADMIN — ENOXAPARIN SODIUM 40 MG: 100 INJECTION SUBCUTANEOUS at 07:56

## 2017-02-07 RX ADMIN — LAMOTRIGINE 200 MG: 100 TABLET ORAL at 07:56

## 2017-02-07 RX ADMIN — ESCITALOPRAM OXALATE 10 MG: 10 TABLET ORAL at 07:56

## 2017-02-07 RX ADMIN — DIPHENHYDRAMINE HYDROCHLORIDE 25 MG: 50 INJECTION, SOLUTION INTRAMUSCULAR; INTRAVENOUS at 15:39

## 2017-02-07 RX ADMIN — LAMOTRIGINE 200 MG: 100 TABLET ORAL at 19:54

## 2017-02-07 ASSESSMENT — PAIN SCALES - GENERAL
PAINLEVEL_OUTOF10: 0

## 2017-02-07 NOTE — PROGRESS NOTES
Patient complaining of hot, tingly sensation in BLE. Patient states she has not felt this before and is unable to keep legs still. Rash present around knees, but denies itching. Notified MD. Will keep ice on legs and will monitor for now.

## 2017-02-07 NOTE — PROGRESS NOTES
NEUROLOGY UPDATE:     Received pc from RN. Pt complaining of leg burning sensation, possibly small fine rash in ankle. Went to see pt again. No clear rash. Discomfort only between knees and ankles, thighs and feet spared. Symptoms appeared today. No weakness. No problems with bladder or bowel control. Has had chronic mild back pain but denies neck pain or pain radiating to any extremities. Shin areas feel like burning, somewhat itchy, urged to move legs. Has dry skin chronically but does not believe related to it. Symptoms better with ice packs. Denies recent infections, vaccines.     Exam is normal. Strength and reflexes are normal. No rash seen.     Offered MRI's of the spine but she refused.  Offered to do blood work, can check vitamin levels too but she is not interested.    Lamictal can be idiosyncratic rash and sunburn like sensation, however could not see rash.   She agrees to do benadryl and to observe symptoms for now.   Pt and family aware she may have seizure as benadryl may lower sz threshold.   RN to measure vital signs and also BS during any event, if pt has a typical event of LOC and or convulsion.   Pt and family agree with plan.

## 2017-02-07 NOTE — PROGRESS NOTES
Received report from NOC RN. Assumed pt care. Assessment completed. AA&Ox4. Pt is currently in bed. VEEG and tele monitoring in place, seizure precautions present. Denies SOB, chest pain, dizziness. Bed alarm on, call light within reach,  pt calls appropriately and does not get out of bed. Bed in lowest position, bed locked, RN and CNA numbers provided, no further needs at this time. No changes from EPIC. Hourly rounding in place.

## 2017-02-07 NOTE — PROGRESS NOTES
Received report from night shift RN and day shift RN preceptor. Assumed care of pt. Observed by RN and performed concise focused assessment. Pt on tele monitoring, bed alarm, and call light placed.

## 2017-02-07 NOTE — PROGRESS NOTES
No chief complaint on file.      Problem List Items Addressed This Visit     None          Interim history:  Orin White was admitted yesterday to EMU. No seizures despite her keppra being held since admission. She states she was sleep deprived, feels tired this am and woke up several times overnight. No spells or warning of spells.     Has been eating her meals and drinks fluid often. No issues. Denies any complaints. No sob or cp.     Wants to sit in recliner today.       Past medical history:   Past Medical History   Diagnosis Date   • Seizure (CMS-[facility])      since October   • Epilepsy (CMS-HCC)    • Depression        Past surgical history:   No past surgical history on file.    Family history:   Family History   Problem Relation Age of Onset   • Diabetes Maternal Grandmother        Social history:   Social History     Social History   • Marital Status: Single     Spouse Name: N/A   • Number of Children: N/A   • Years of Education: N/A     Occupational History   • Not on file.     Social History Main Topics   • Smoking status: Never Smoker    • Smokeless tobacco: Never Used   • Alcohol Use: 0.0 oz/week     0 Standard drinks or equivalent per week      Comment: occ.   • Drug Use: No   • Sexual Activity: Not Currently     Other Topics Concern   • Not on file     Social History Narrative    ** Merged History Encounter **         [employer] -senior    Lives with Mom, sisters, brother               Current medications:   Current Facility-Administered Medications   Medication Dose   • escitalopram (LEXAPRO) tablet 10 mg  10 mg   • lorazepam (ATIVAN) injection 1 mg  1 mg   • lamotrigine (LAMICTAL) tablet 200 mg  200 mg   • enoxaparin (LOVENOX) inj 40 mg  40 mg       Medication Allergy:  No Known Allergies    Review of systems:   Constitutional: denies fever, night sweats, weight loss.   Eyes: denies acute vision change, eye pain or secretion.   Ears, Nose, Mouth, Throat: denies nasal secretion, nasal bleeding,  difficulty swallowing, hearing loss, tinnitus, vertigo, ear pain, acute dental problems, oral ulcers or lesions.   Endocrine: denies recent weight changes, heat or cold intolerance, polyuria, polydypsia, polyphagia,abnormal hair growth.  Cardiovascular: denies new onset of chest pain, palpitations, syncope, or dyspnea of exertion.  Pulmonary: denies shortness of breath, new onset of cough, hemoptysis, wheezing, chest pain or flu-like symptoms.   GI: denies nausea, vomiting, diarrhea, GI bleeding, change in appetite, abdominal pain, and change in bowel habits.  : denies dysuria, urinary incontinence, hematuria.  Heme/oncology: denies history of easy bruising or bleeding. No history of cancer, DVTor PE.  Allergy/immunology: denies hives/urticaria, or itching.   Dermatologic: denies new rash, or new skin lesions.  Musculoskeletal:denies joint swelling or pain, muscle pain, neck and back pain.   Neurologic: denies headaches, acute visual changes, facial droopiness, muscle weakness (focal or generalized), paresthesias, anesthesia, ataxia, change in speech or language, memory loss, abnormal movements, seizures, loss of consciousness, or episodes of confusion.   Psychiatric: denies symptoms of depression, anxiety, hallucinations, mood swings or changes, suicidal or homicidal thoughts.     Physical examination:   Filed Vitals:    02/06/17 2000 02/07/17 0000 02/07/17 0400 02/07/17 0816   BP: 106/90 137/93 108/64 125/77   Pulse: 76 70 78 72   Temp: 36.9 °C (98.5 °F) 36.8 °C (98.3 °F) 36.6 °C (97.8 °F) 36.4 °C (97.5 °F)   Resp: 16 16 16 16   Height:       Weight:       SpO2: 96% 95% 97% 97%     General: Patient in no acute distress, pleasant and cooperative.  HEENT: Normocephalic, no signs of acute trauma.   Neck: supple, no meningeal signs or carotid bruits. There is normal range of motion. No tenderness on exam.   Chest: clear to auscultation. No cough.   CV: RRR, no murmurs.   Skin: no signs of acute rashes or trauma.    Musculoskeletal: joints exhibit full range of motion, without any pain to palpation. There are no signs of joint or muscle swelling. There is no tenderness to deep palpation of muscles.   Psychiatric: No hallucinatory behavior. Denies symptoms of depression or suicidal ideation. Mood and affect appear normal on exam.     NEUROLOGICAL EXAM:    Mental status, orientation: Awake, alert and fully oriented.    Speech and language: speech is clear and fluent. The patient is able to name, repeat and comprehend.    Memory: There is intact recollection of recent and remote events.    Cranial nerve exam: Pupils are 3-4 mm bilaterally and equally reactive to light and accommodation. Visual fields are intact by confrontation. There is no nystagmus on primary or secondary gaze. Intact full EOM in all directions of gaze. Face appears symmetric. Sensation in the face is intact to light touch. Uvula is midline. Palate elevates symmetrically. Tongue is midline and without any signs of tongue biting or fasciculations. Sternocleidomastoid muscles exhibit is normal strength bilaterally. Shoulder shrug is intact bilaterally.    Motor exam: Strength is 5/5 in all extremities. Tone is normal. No abnormal movements were seen on exam.    Sensory exam reveals normal sense of light touch in all extremities.    Deep tendon reflexes:  2+ throughout. Plantar responses are flexor. There is no clonus.    Coordination: shows a normal finger-nose-finger. Normal rapidly alternating movements.    Gait: The patient was able to get up from seated position on first attempt without requiring assistance. Found to be steady when walking. Movements were fluid with normal arm swing. The patient was able to turn without difficulties or tendency to fall. Romberg exam unremarkable.          ANCILLARY DATA REVIEWED:     Lab Data Review:  No results found for this or any previous visit (from the past 24 hour(s)).    Imaging:    [facility] 11/18/15       No evidence of  acute intracranial process.                       MRI brain w/wo, sz protocol, 10/4/16:  1. Agenesis of the septum pellucidum.  2. No evidence of mass lesion, heterotopic gray matter, gross cortical dysplasia or mesial temporal sclerosis.  3. There are rare scattered punctate areas of of increased T2 signal intensity in the periventricular and subcortical white matter consistent with areas of chronic microvascular ischemic change, demyelination, or gliosis.  (Personally reviewed images and shown to pt, aware since younger about agenesis of septum).      EEG, 24 hrs amb EEG, 11/22/16:  This is a normal 24 hours ambulatory electroencephalogram recording in the awake, drowsy and sleep state.  No events were captured during the study. Clinical correlation is recommended.  Note: A normal electroencephalogram does not rule out epilepsy.       VEEG overnight:   No events. No seizures. Normal.             ASSESSMENT AND PLAN:    1. Localization-related epilepsy (HCC)  - REFERRAL TO NEURODIAGNOSTICS (EEG,EP,EMG/NCS/DBS) Modality Requested: EEG-Video  - LAMOTRIGINE; Future  - KEPPRA; Future  - VITAMIN D,25 HYDROXY; Future  - CBC WITH DIFFERENTIAL; Future  - COMP METABOLIC PANEL; Future    2. Insomnia, unspecified type  Uses melatonin 10 mg ER.     3. Abnormal finding on MRI of brain      CLINICAL DISCUSSION:  Side effects of anxiety and depression (not suicidal) on keppra, these are now improved since being on lamictal. Mood is now good.    Increase lamictal  mg tabs, take two po daily in am.    Continue with Keppra while titrating lamictal, will discuss possibly starting a taper at next few appointments, if she remains sz free.    Given normal 24 hrs amb EEG, needs EMU admission for 5 days to characterize her spells.    Agenesis of septum pellucidum on MRI brain, mild WM changes.  Needs blood work, was hyponatremic after seizure in the past. Will check keppra and lamictal level as well as cbc, cmp, and vitamin d (will  do labs outpt).     Needs contraception, recommend IUD (bare metal). Extensive conversation about risk of epilepsy (particurly not well controlled) with pregnancy and also fetal complications related to epilepsy and AED's. Lamictal may be the safest. They agree she is not ready for pregnancy, understands we must plan for a minimum of six months before. Planning for bare metal IUD.    Insomnia may be a side effect of lamictal, melatonin 10 mg bilayer, take two hrs prior to bedtime is helping.    Continue to refrain from driving for now.       Nature of EMU admission discussed.    Fall precautions discussed, should not get out of bed by herself.  Seizure precautions.    She agrees to continue to hold keppra at least until tomorrow, continue with lamictal unchanged. Aware of risk of having further seizures and their consequences.    Agrees with lovenox for DVT prophylaxis.    Continue with VEEG.     Discussed with RN, ok for pt to stay in recliner for several hours. Aware she should not get out of bed / recliner by herself and will need assistance.         FOLLOW-UP:    After EMU.       EDUCATION AND COUNSELING:  -Education was provided to the patient and/or family regarding diagnosis and prognosis. The chronic and unpredictable nature of the condition were discussed. There is increased risk for additional events, which may carry potential for significant injuries and death.    -We reviewed the current antiepileptic regimen. Potential side effects of antiepileptics were discussed at length, including but no limited to: hypersensitivity reactions (rash and others, some of which can be fatal), visual field changes (some of which may be irreversible), glaucoma, diplopia, kidney stones, osteopenia/osteoporosis/bone fractures, hyperthermia/anhydrosis, tremors/abnormal movements, ataxia, dizziness, fatigue, increased risk for falls, cardiac arrhythmias/syncope, gastrointestinal side effects(hepatitis, pancreatitis,  gastritis, ulcers), gingival hypertrophy/bleeding, drowsiness, sedation, anxiety/nervousness, increased risk for suicide, increased risk for depression, and psychosis.    -We reviewed drug-drug interactions and their potential effect on seizure control and medication side effects.     -We also reviewed potential effects of seizures, epilepsy, and medications during pregnancy, including but not limited to fetal malformations, child developmental/intellectual disability, fetal/ risk for hemorrhages, stillbirth, maternal death, premature birth and others. The patient/family aware that pregnancy should be avoided, unless desired, in which case we recommend discussing with us at least a year prior to planned conception. To avoid undesired pregnancy while on antiepileptics, we recommend dual contraception.    -Folic acid 2 mg is recommended for all females in childbearing age (12-44 years of age).    -Recommend vitamin D supplementation.    -Patient/family educated on risk for SUDEP (Sudden Death in Epilepsy). Counseling was provided on the importance of strict medication and follow up compliance. The patient/family understand the risks associated with non-adherence with the medical plan as outlined, including but not limited to an increased risk for breakthrough seizures, which may contribute to injuries, disability, status epilepticus, and even death.    -Counseling was also provided on potential effects of alcohol and other drugs, which may lower seizure threshold and/or affect the metabolism of antiepileptic drugs. We recommend avoidance of alcohol and illegal drugs.  -We extensively discussed the aspects related to safety in drivers who suffer from epilepsy. The patient is encourage to report to the Division of Motor Vehicles of any condition and/or spells related to confusion, disorientation, and/or loss of awareness and/or loss of consciousness; as these may pose a safety issue if they occur while operating  a motor vehicle. The patient and/or family are ultimately responsible for exercising caution and abiding to regulations in place.    -Other seizure precautions were discussed at length, including no diving, no skydiving, no climbing or exposure to unprotected heights, no unsupervised swimming, no Jacuzzi or bathing in bathtubs or deep bodies of water.     Patient agrees with plan, as outlined.                  Mk Butterfield MD  Medical Director, Epilepsy and Neurodiagnostics.   Clinical  of Neurology [facility].   Diplomate in Neurology, Epilepsy, and Electrodiagnostic Medicine.   Office: [phone]  Fax: [phone]

## 2017-02-07 NOTE — CARE PLAN
Problem: Safety  Goal: Will remain free from falls  Outcome: PROGRESSING AS EXPECTED  Orin V Alejandron educated about fall risk. Orin will remain free from injury or falls. Appropriate side rails up. Bed in low position, call light and possions within reach, bed alarm in use. Hourly rounding in place.    Problem: Knowledge Deficit  Goal: Knowledge of disease process/condition, treatment plan, diagnostic tests, and medications will improve  Outcome: PROGRESSING AS EXPECTED

## 2017-02-07 NOTE — CARE PLAN
Problem: Communication  Goal: The ability to communicate needs accurately and effectively will improve  Outcome: PROGRESSING AS EXPECTED  Uses call light to communicate needs appropriately.    Problem: Venous Thromboembolism (VTW)/Deep Vein Thrombosis (DVT) Prevention:  Goal: Patient will participate in Venous Thrombosis (VTE)/Deep Vein Thrombosis (DVT)Prevention Measures  Outcome: PROGRESSING AS EXPECTED  Pt given Lovenox for DVT/VTE prophylaxis.

## 2017-02-07 NOTE — PROGRESS NOTES
"Orin White assessed after assuming care at beginning of shift . no signs of acute distress and appears to be in stable condition. Last documented VS: /61 mmHg  Pulse 69  Temp(Src) 36.7 °C (98 °F)  Resp 18  Ht 1.499 m (4' 11\")  Wt 86.183 kg (190 lb)  BMI 38.35 kg/m2  SpO2 96%      Mobility: Patient in bed at this time    Treaded slippers on bed alarm on. Hourly rounding in place and explained to Orin. Educated Orin on call light use as well as phone instructions to call RN or CNA directly. Possessions within patient's reach, fall precautions in place.     Orin educated on Pain, Position, Potty, Priorities and instructed to notify RN if anything additional can be done to make stay more comfortable including linen change and toiletries.     *addendum to follow below at end or throughout shift if significant events occurred: if blank n/a    OVERNIGHT SIGNIFICANT EVENTS:  n/a      "

## 2017-02-08 PROCEDURE — 770020 HCHG ROOM/CARE - TELE (206)

## 2017-02-08 PROCEDURE — 700111 HCHG RX REV CODE 636 W/ 250 OVERRIDE (IP): Performed by: PSYCHIATRY & NEUROLOGY

## 2017-02-08 PROCEDURE — A9270 NON-COVERED ITEM OR SERVICE: HCPCS | Performed by: PSYCHIATRY & NEUROLOGY

## 2017-02-08 PROCEDURE — 700102 HCHG RX REV CODE 250 W/ 637 OVERRIDE(OP): Performed by: PSYCHIATRY & NEUROLOGY

## 2017-02-08 PROCEDURE — 95951 HCHG EEG-VIDEO-24HR: CPT

## 2017-02-08 RX ORDER — GABAPENTIN 300 MG/1
300 CAPSULE ORAL EVERY EVENING
Status: DISCONTINUED | OUTPATIENT
Start: 2017-02-08 | End: 2017-02-10 | Stop reason: HOSPADM

## 2017-02-08 RX ADMIN — ENOXAPARIN SODIUM 40 MG: 100 INJECTION SUBCUTANEOUS at 09:30

## 2017-02-08 RX ADMIN — ESCITALOPRAM OXALATE 10 MG: 10 TABLET ORAL at 09:30

## 2017-02-08 RX ADMIN — LAMOTRIGINE 200 MG: 100 TABLET ORAL at 19:42

## 2017-02-08 RX ADMIN — GABAPENTIN 300 MG: 300 CAPSULE ORAL at 19:42

## 2017-02-08 RX ADMIN — LAMOTRIGINE 200 MG: 100 TABLET ORAL at 09:30

## 2017-02-08 ASSESSMENT — PAIN SCALES - GENERAL
PAINLEVEL_OUTOF10: 0

## 2017-02-08 NOTE — PROGRESS NOTES
Assumed pt care at 1900. Received report from day RN. Assessment completed. Pt A&Ox4. Pain 0/10. Bed in lowest position, locked, and bed alarm is on. Pt calls appropriately. Treaded socks in place, call light within reach and staff numbers provided. No seizure activity today. Pt needs met at this time.

## 2017-02-08 NOTE — PROGRESS NOTES
Received report from night shift RN. Assumed care of patient with day shift RN. Pt assessed and stable. VSS. Patient reports 0/10 pain at this time. Discussed plan of care for day with patient and received verbal understanding. Call light within reach, strip alarm active, bed in low position.

## 2017-02-08 NOTE — CARE PLAN
Problem: Safety  Goal: Will remain free from injury  Outcome: PROGRESSING AS EXPECTED  Fall precautions in place: treaded slipper socks on, mobility signs posted, hourly rounding, bed in lowest position, belongings and call light are within reach, bed alarm on, and near nurses station.    Problem: Venous Thromboembolism (VTW)/Deep Vein Thrombosis (DVT) Prevention:  Goal: Patient will participate in Venous Thrombosis (VTE)/Deep Vein Thrombosis (DVT)Prevention Measures  Outcome: PROGRESSING AS EXPECTED  Pt given Lovenox for DVT/VTE prophylaxis.

## 2017-02-08 NOTE — CARE PLAN
Problem: Safety  Goal: Will remain free from falls  Outcome: PROGRESSING AS EXPECTED  Pt educated to call before getting up. Bed alarm is on and pt calls appropriately.    Problem: Venous Thromboembolism (VTW)/Deep Vein Thrombosis (DVT) Prevention:  Goal: Patient will participate in Venous Thrombosis (VTE)/Deep Vein Thrombosis (DVT)Prevention Measures  Outcome: PROGRESSING AS EXPECTED

## 2017-02-09 LAB
25(OH)D3 SERPL-MCNC: 11 NG/ML (ref 30–100)
ALBUMIN SERPL BCP-MCNC: 4.6 G/DL (ref 3.2–4.9)
ALBUMIN/GLOB SERPL: 1.6 G/DL
ALP SERPL-CCNC: 83 U/L (ref 30–99)
ALT SERPL-CCNC: 45 U/L (ref 2–50)
ANION GAP SERPL CALC-SCNC: 7 MMOL/L (ref 0–11.9)
AST SERPL-CCNC: 21 U/L (ref 12–45)
BASOPHILS # BLD AUTO: 0.5 % (ref 0–1.8)
BASOPHILS # BLD: 0.05 K/UL (ref 0–0.12)
BILIRUB SERPL-MCNC: 0.3 MG/DL (ref 0.1–1.5)
BUN SERPL-MCNC: 15 MG/DL (ref 8–22)
CALCIUM SERPL-MCNC: 9.5 MG/DL (ref 8.5–10.5)
CHLORIDE SERPL-SCNC: 105 MMOL/L (ref 96–112)
CO2 SERPL-SCNC: 24 MMOL/L (ref 20–33)
CREAT SERPL-MCNC: 0.79 MG/DL (ref 0.5–1.4)
EOSINOPHIL # BLD AUTO: 0.15 K/UL (ref 0–0.51)
EOSINOPHIL NFR BLD: 1.4 % (ref 0–6.9)
ERYTHROCYTE [DISTWIDTH] IN BLOOD BY AUTOMATED COUNT: 41.7 FL (ref 35.9–50)
EST. AVERAGE GLUCOSE BLD GHB EST-MCNC: 117 MG/DL
FERRITIN SERPL-MCNC: 58.1 NG/ML (ref 10–291)
FOLATE SERPL-MCNC: 14 NG/ML
GFR SERPL CREATININE-BSD FRML MDRD: >60 ML/MIN/1.73 M 2
GLOBULIN SER CALC-MCNC: 2.8 G/DL (ref 1.9–3.5)
GLUCOSE SERPL-MCNC: 105 MG/DL (ref 65–99)
HBA1C MFR BLD: 5.7 % (ref 0–5.6)
HCT VFR BLD AUTO: 44.1 % (ref 37–47)
HGB BLD-MCNC: 14.5 G/DL (ref 12–16)
IMM GRANULOCYTES # BLD AUTO: 0.04 K/UL (ref 0–0.11)
IMM GRANULOCYTES NFR BLD AUTO: 0.4 % (ref 0–0.9)
LYMPHOCYTES # BLD AUTO: 3.64 K/UL (ref 1–4.8)
LYMPHOCYTES NFR BLD: 33.8 % (ref 22–41)
MCH RBC QN AUTO: 30.1 PG (ref 27–33)
MCHC RBC AUTO-ENTMCNC: 32.9 G/DL (ref 33.6–35)
MCV RBC AUTO: 91.7 FL (ref 81.4–97.8)
MONOCYTES # BLD AUTO: 0.81 K/UL (ref 0–0.85)
MONOCYTES NFR BLD AUTO: 7.5 % (ref 0–13.4)
NEUTROPHILS # BLD AUTO: 6.07 K/UL (ref 2–7.15)
NEUTROPHILS NFR BLD: 56.4 % (ref 44–72)
NRBC # BLD AUTO: 0 K/UL
NRBC BLD AUTO-RTO: 0 /100 WBC
PLATELET # BLD AUTO: 353 K/UL (ref 164–446)
PMV BLD AUTO: 9.7 FL (ref 9–12.9)
POTASSIUM SERPL-SCNC: 3.8 MMOL/L (ref 3.6–5.5)
PROT SERPL-MCNC: 7.4 G/DL (ref 6–8.2)
RBC # BLD AUTO: 4.81 M/UL (ref 4.2–5.4)
SODIUM SERPL-SCNC: 136 MMOL/L (ref 135–145)
VIT B12 SERPL-MCNC: 583 PG/ML (ref 211–911)
WBC # BLD AUTO: 10.8 K/UL (ref 4.8–10.8)

## 2017-02-09 PROCEDURE — 83036 HEMOGLOBIN GLYCOSYLATED A1C: CPT

## 2017-02-09 PROCEDURE — 36415 COLL VENOUS BLD VENIPUNCTURE: CPT

## 2017-02-09 PROCEDURE — A9270 NON-COVERED ITEM OR SERVICE: HCPCS | Performed by: PSYCHIATRY & NEUROLOGY

## 2017-02-09 PROCEDURE — 770020 HCHG ROOM/CARE - TELE (206)

## 2017-02-09 PROCEDURE — 82306 VITAMIN D 25 HYDROXY: CPT

## 2017-02-09 PROCEDURE — 85025 COMPLETE CBC W/AUTO DIFF WBC: CPT

## 2017-02-09 PROCEDURE — 80053 COMPREHEN METABOLIC PANEL: CPT

## 2017-02-09 PROCEDURE — 80175 DRUG SCREEN QUAN LAMOTRIGINE: CPT

## 2017-02-09 PROCEDURE — 82607 VITAMIN B-12: CPT

## 2017-02-09 PROCEDURE — 700111 HCHG RX REV CODE 636 W/ 250 OVERRIDE (IP): Performed by: PSYCHIATRY & NEUROLOGY

## 2017-02-09 PROCEDURE — 700102 HCHG RX REV CODE 250 W/ 637 OVERRIDE(OP): Performed by: PSYCHIATRY & NEUROLOGY

## 2017-02-09 PROCEDURE — 82728 ASSAY OF FERRITIN: CPT

## 2017-02-09 PROCEDURE — 82746 ASSAY OF FOLIC ACID SERUM: CPT

## 2017-02-09 PROCEDURE — 84207 ASSAY OF VITAMIN B-6: CPT

## 2017-02-09 PROCEDURE — 95951 HCHG EEG-VIDEO-24HR: CPT

## 2017-02-09 RX ORDER — ERGOCALCIFEROL 1.25 MG/1
50000 CAPSULE ORAL
Status: DISCONTINUED | OUTPATIENT
Start: 2017-02-09 | End: 2017-02-10 | Stop reason: HOSPADM

## 2017-02-09 RX ADMIN — ENOXAPARIN SODIUM 40 MG: 100 INJECTION SUBCUTANEOUS at 09:24

## 2017-02-09 RX ADMIN — GABAPENTIN 300 MG: 300 CAPSULE ORAL at 19:45

## 2017-02-09 RX ADMIN — ERGOCALCIFEROL 50000 UNITS: 1.25 CAPSULE ORAL at 09:24

## 2017-02-09 RX ADMIN — ESCITALOPRAM OXALATE 10 MG: 10 TABLET ORAL at 09:24

## 2017-02-09 RX ADMIN — LAMOTRIGINE 200 MG: 100 TABLET ORAL at 09:24

## 2017-02-09 RX ADMIN — LAMOTRIGINE 200 MG: 100 TABLET ORAL at 19:44

## 2017-02-09 ASSESSMENT — PAIN SCALES - GENERAL
PAINLEVEL_OUTOF10: 0
PAINLEVEL_OUTOF10: 0

## 2017-02-09 NOTE — PROGRESS NOTES
No chief complaint on file.      Problem List Items Addressed This Visit     None          Interim history:  Orin White was admitted two days ago to EMU. No seizures despite her keppra being held since admission. She states she was self-sleep deprived last night again till 3 am, feels tired this am and woke up several times overnight. No spells or warning of spells.     Has been eating her meals and drinks fluid often. No issues. Denies any complaints. No sob or cp.     Starting with burning sensation in the legs (between knees and ankles only) bilaterally yesterday. Urge to move legs constantly. Ice helps. Benadryl did not help. Skin is dry. No rash, although cheeks perhaps more red than usual per family at bedside. No back or neck pain. No bladder or bowel problems. Strength intact.         Past medical history:   Past Medical History   Diagnosis Date   • Seizure (CMS-HCC)      since October   • Epilepsy (CMS-HCC)    • Depression        Past surgical history:   Denies.       Family history:   Family History   Problem Relation Age of Onset   • Diabetes Maternal Grandmother        Social history:   Social History     Social History   • Marital Status: Single     Spouse Name: N/A   • Number of Children: N/A   • Years of Education: N/A     Occupational History   • Not on file.     Social History Main Topics   • Smoking status: Never Smoker    • Smokeless tobacco: Never Used   • Alcohol Use: 0.0 oz/week     0 Standard drinks or equivalent per week      Comment: occ.   • Drug Use: No   • Sexual Activity: Not Currently     Other Topics Concern   • Not on file     Social History Narrative    ** Merged History Encounter **         [employer] -senior    Lives with Mom, sisters, brother               Current medications:   Current Facility-Administered Medications   Medication Dose   • diphenhydrAMINE (BENADRYL) injection 25 mg  25 mg   • escitalopram (LEXAPRO) tablet 10 mg  10 mg   • lorazepam (ATIVAN) injection 1 mg  1  mg   • lamotrigine (LAMICTAL) tablet 200 mg  200 mg   • enoxaparin (LOVENOX) inj 40 mg  40 mg       Medication Allergy:  No Known Allergies    Review of systems:   Constitutional: denies fever, night sweats, weight loss.   Eyes: denies acute vision change, eye pain or secretion.   Ears, Nose, Mouth, Throat: denies nasal secretion, nasal bleeding, difficulty swallowing, hearing loss, tinnitus, vertigo, ear pain, acute dental problems, oral ulcers or lesions.    Endocrine: denies recent weight changes, heat or cold intolerance, polyuria, polydypsia, polyphagia,abnormal hair growth.  Cardiovascular: denies new onset of chest pain, palpitations, syncope, or dyspnea of exertion.  Pulmonary: denies shortness of breath, new onset of cough, hemoptysis, wheezing, chest pain or flu-like symptoms.   GI: denies nausea, vomiting, diarrhea, GI bleeding, change in appetite, abdominal pain, and change in bowel habits.  : denies dysuria, urinary incontinence, hematuria.  Heme/oncology: denies history of easy bruising or bleeding. No history of cancer, DVTor PE.  Allergy/immunology: denies hives/urticaria, or itching.   Dermatologic: denies new rash, or new skin lesions.  Musculoskeletal:denies joint swelling or pain, muscle pain, neck and back pain.   Neurologic: denies headaches, acute visual changes, facial droopiness, muscle weakness (focal or generalized), anesthesia, ataxia, change in speech or language, memory loss, abnormal movements, seizures, loss of consciousness, or episodes of confusion.   Psychiatric: denies symptoms of depression, anxiety, hallucinations, mood swings or changes, suicidal or homicidal thoughts.         Physical examination:   Filed Vitals:    02/08/17 0400 02/08/17 0809 02/08/17 1223 02/08/17 1532   BP: 111/77 109/50 111/66 107/59   Pulse: 73 63 71 73   Temp: 36.4 °C (97.5 °F) 36.3 °C (97.4 °F) 36.4 °C (97.5 °F) 36.2 °C (97.1 °F)   Resp: 18 16 16 16   Height:       Weight:       SpO2: 96% 97% 98% 93%      General: Patient in no acute distress, pleasant and cooperative.  HEENT: Normocephalic, no signs of acute trauma.   Neck: supple, no meningeal signs or carotid bruits. There is normal range of motion. No tenderness on exam.   Chest: clear to auscultation. No cough.   CV: RRR, no murmurs.   Skin: no signs of acute rashes or trauma.   Musculoskeletal: joints exhibit full range of motion, without any pain to palpation. There are no signs of joint or muscle swelling. There is no tenderness to deep palpation of muscles.   Psychiatric: No hallucinatory behavior. Denies symptoms of depression or suicidal ideation. Mood and affect appear normal on exam.     NEUROLOGICAL EXAM:    Mental status, orientation: Awake, alert and fully oriented.    Speech and language: speech is clear and fluent. The patient is able to name, repeat and comprehend.    Memory: There is intact recollection of recent and remote events.    Cranial nerve exam: Pupils are 3-4 mm bilaterally and equally reactive to light and accommodation. Visual fields are intact by confrontation. There is no nystagmus on primary or secondary gaze. Intact full EOM in all directions of gaze. Face appears symmetric. Sensation in the face is intact to light touch. Uvula is midline. Palate elevates symmetrically. Tongue is midline and without any signs of tongue biting or fasciculations. Sternocleidomastoid muscles exhibit is normal strength bilaterally. Shoulder shrug is intact bilaterally.    Motor exam: Strength is 5/5 in all extremities. Tone is normal. No abnormal movements were seen on exam.    Sensory exam reveals normal sense of light touch in all extremities.    Deep tendon reflexes:  2+ throughout. Plantar responses are flexor. There is no clonus.    Coordination: shows a normal finger-nose-finger. Normal rapidly alternating movements.    Gait: The patient was able to get up from seated position on first attempt without requiring assistance. Found to be steady  when walking. Movements were fluid with normal arm swing. The patient was able to turn without difficulties or tendency to fall. Romberg exam unremarkable.        ANCILLARY DATA REVIEWED:     Imaging:    CTB 11/18/15        No evidence of acute intracranial process.                           MRI brain w/wo, sz protocol, 10/4/16:  1. Agenesis of the septum pellucidum.  2. No evidence of mass lesion, heterotopic gray matter, gross cortical dysplasia or mesial temporal sclerosis.  3. There are rare scattered punctate areas of of increased T2 signal intensity in the periventricular and subcortical white matter consistent with areas of chronic microvascular ischemic change, demyelination, or gliosis.  (Personally reviewed images and shown to pt, aware since younger about agenesis of septum).      EEG, 24 hrs amb EEG, 11/22/16:  This is a normal 24 hours ambulatory electroencephalogram recording in the awake, drowsy and sleep state.  No events were captured during the study. Clinical correlation is recommended.  Note: A normal electroencephalogram does not rule out epilepsy.       VEEG overnight:   No events. No seizures. Normal.             ASSESSMENT AND PLAN:    1. Localization-related epilepsy (HCC)    2. Insomnia, unspecified type  Uses melatonin 10 mg ER.     3. Abnormal finding on MRI of brain    4. Abnormal skin sensation. New onset. No back or neck pain. Limited to skin between knees and ankles. No clear rash. Benadryl did not help. Does not want MRI's. Will order vitamin levels and iron studies. Suggestive of RLS, urge to constantly move legs.   Trial of neurontin tonight, side effects discussed. Agrees.         CLINICAL DISCUSSION:  Side effects of anxiety and depression (not suicidal) on keppra, these are now improved since being on lamictal. Mood is now good.    Increased lamictal  mg tabs, take two po daily in am.   Keppra being held during this admission with hope to capture spell.   Some spells may  be syncopal in nature.    Normal 24 hrs amb EEG, and normal VEEG so far, no spells either.    Agenesis of septum pellucidum on MRI brain, mild WM changes.  Needs blood work, was hyponatremic after seizure in the past.   Will check  cbc, cmp, and vitamin d, lamictal level, and vitamins and iron tomorrow am.     Needs contraception, recommend IUD (bare metal). Extensive conversation about risk of epilepsy (particurly not well controlled) with pregnancy and also fetal complications related to epilepsy and AED's. Lamictal may be the safest. They agree she is not ready for pregnancy, understands we must plan for a minimum of six months before. Planning for bare metal IUD.    Insomnia may be a side effect of lamictal, melatonin 10 mg bilayer, take two hrs prior to bedtime is helping.    Continue to refrain from driving for now.       Nature of EMU admission discussed.    Fall precautions discussed, should not get out of bed by herself.  Seizure precautions.    She agrees to continue to hold keppra at least until tomorrow, continue with lamictal unchanged. Aware of risk of having further seizures and their consequences.    Agrees with lovenox for DVT prophylaxis.    Continue with VEEG.     If event, RN instructed to measure vitals and BS as well, father concerned about possible hypoglycemia.         FOLLOW-UP:    After EMU.       EDUCATION AND COUNSELING:  -Education was provided to the patient and/or family regarding diagnosis and prognosis. The chronic and unpredictable nature of the condition were discussed. There is increased risk for additional events, which may carry potential for significant injuries and death.    -We reviewed the current antiepileptic regimen. Potential side effects of antiepileptics were discussed at length, including but no limited to: hypersensitivity reactions (rash and others, some of which can be fatal), visual field changes (some of which may be irreversible), glaucoma, diplopia, kidney  stones, osteopenia/osteoporosis/bone fractures, hyperthermia/anhydrosis, tremors/abnormal movements, ataxia, dizziness, fatigue, increased risk for falls, cardiac arrhythmias/syncope, gastrointestinal side effects(hepatitis, pancreatitis, gastritis, ulcers), gingival hypertrophy/bleeding, drowsiness, sedation, anxiety/nervousness, increased risk for suicide, increased risk for depression, and psychosis.    -We reviewed drug-drug interactions and their potential effect on seizure control and medication side effects.     -We also reviewed potential effects of seizures, epilepsy, and medications during pregnancy, including but not limited to fetal malformations, child developmental/intellectual disability, fetal/ risk for hemorrhages, stillbirth, maternal death, premature birth and others. The patient/family aware that pregnancy should be avoided, unless desired, in which case we recommend discussing with us at least a year prior to planned conception. To avoid undesired pregnancy while on antiepileptics, we recommend dual contraception.    -Folic acid 2 mg is recommended for all females in childbearing age (12-44 years of age).    -Recommend vitamin D supplementation.    -Patient/family educated on risk for SUDEP (Sudden Death in Epilepsy). Counseling was provided on the importance of strict medication and follow up compliance. The patient/family understand the risks associated with non-adherence with the medical plan as outlined, including but not limited to an increased risk for breakthrough seizures, which may contribute to injuries, disability, status epilepticus, and even death.    -Counseling was also provided on potential effects of alcohol and other drugs, which may lower seizure threshold and/or affect the metabolism of antiepileptic drugs. We recommend avoidance of alcohol and illegal drugs.  -We extensively discussed the aspects related to safety in drivers who suffer from epilepsy. The patient is  encourage to report to the Division of Motor Vehicles of any condition and/or spells related to confusion, disorientation, and/or loss of awareness and/or loss of consciousness; as these may pose a safety issue if they occur while operating a motor vehicle. The patient and/or family are ultimately responsible for exercising caution and abiding to regulations in place.    -Other seizure precautions were discussed at length, including no diving, no skydiving, no climbing or exposure to unprotected heights, no unsupervised swimming, no Jacuzzi or bathing in bathtubs or deep bodies of water.     Patient agrees with plan, as outlined.                            Mk Butterfield MD  Medical Director, Epilepsy and Neurodiagnostics.   Clinical  of Neurology [facility].   Diplomate in Neurology, Epilepsy, and Electrodiagnostic Medicine.   Office: [phone]  Fax: [phone]

## 2017-02-09 NOTE — PROGRESS NOTES
Assumed pt care at 1900. Received report from day RN. Assessment completed. Pt A&Ox4. Pain 0/10. Bed in lowest position, locked, and bed alarm is on. Pt calls appropriately. Treaded socks in place, call light within reach and staff numbers provided. No seizure like activity during previous shift. Pt needs met at this time.

## 2017-02-09 NOTE — PROGRESS NOTES
No chief complaint on file.      Problem List Items Addressed This Visit     None          Interim history:  Orin White was admitted to EMU. No seizures despite her keppra being held since admission. She states she was self-sleep deprived every night, again last night. Feels tired, yet not spells.     No spells or warning of spells since admission.     Has been eating her meals and drinks fluid often. No issues. Denies any complaints. No sob or cp.     Symptoms of burning sensation in the legs (between knees and ankles only) bilaterally for two days, now resolved, after a dose of neurontin last night, also resolved is her urge to move legs constantly. Benadryl did not help. Skin is dry. No rash. No back or neck pain. No bladder or bowel problems. Strength remains intact.         Past medical history:   Past Medical History   Diagnosis Date   • Seizure (CMS-[facility])      since October   • Epilepsy (CMS-HCC)    • Depression        Past surgical history:   No past surgical history on file.    Family history:   Family History   Problem Relation Age of Onset   • Diabetes Maternal Grandmother        Social history:   Social History     Social History   • Marital Status: Single     Spouse Name: N/A   • Number of Children: N/A   • Years of Education: N/A     Occupational History   • Not on file.     Social History Main Topics   • Smoking status: Never Smoker    • Smokeless tobacco: Never Used   • Alcohol Use: 0.0 oz/week     0 Standard drinks or equivalent per week      Comment: occ.   • Drug Use: No   • Sexual Activity: Not Currently     Other Topics Concern   • Not on file     Social History Narrative    ** Merged History Encounter **         [employer] -senior    Lives with Mom, sisters, brother               Current medications:   Current Facility-Administered Medications   Medication Dose   • vitamin D (Ergocalciferol) (DRISDOL) capsule 50,000 Units  50,000 Units   • gabapentin (NEURONTIN) capsule 300 mg  300 mg   •  diphenhydrAMINE (BENADRYL) injection 25 mg  25 mg   • escitalopram (LEXAPRO) tablet 10 mg  10 mg   • lorazepam (ATIVAN) injection 1 mg  1 mg   • lamotrigine (LAMICTAL) tablet 200 mg  200 mg   • enoxaparin (LOVENOX) inj 40 mg  40 mg       Medication Allergy:  No Known Allergies    Review of systems:   Constitutional: denies fever, night sweats, weight loss.    Eyes: denies acute vision change, eye pain or secretion.    Ears, Nose, Mouth, Throat: denies nasal secretion, nasal bleeding, difficulty swallowing, hearing loss, tinnitus, vertigo, ear pain, acute dental problems, oral ulcers or lesions.    Endocrine: denies recent weight changes, heat or cold intolerance, polyuria, polydypsia, polyphagia,abnormal hair growth.  Cardiovascular: denies new onset of chest pain, palpitations, syncope, or dyspnea of exertion.  Pulmonary: denies shortness of breath, new onset of cough, hemoptysis, wheezing, chest pain or flu-like symptoms.    GI: denies nausea, vomiting, diarrhea, GI bleeding, change in appetite, abdominal pain, and change in bowel habits.  : denies dysuria, urinary incontinence, hematuria.  Heme/oncology: denies history of easy bruising or bleeding. No history of cancer, DVTor PE.  Allergy/immunology: denies hives/urticaria, or itching.    Dermatologic: denies new rash, or new skin lesions.  Musculoskeletal:denies joint swelling or pain, muscle pain, neck and back pain.    Neurologic: denies headaches, acute visual changes, facial droopiness, muscle weakness (focal or generalized), anesthesia, ataxia, change in speech or language, memory loss, abnormal movements, seizures, loss of consciousness, or episodes of confusion.    Psychiatric: denies symptoms of depression, anxiety, hallucinations, mood swings or changes, suicidal or homicidal thoughts.     Physical examination:   Filed Vitals:    02/08/17 2000 02/09/17 0000 02/09/17 0400 02/09/17 0800   BP: 133/78 118/69 124/72 107/62   Pulse: 62 111 75 68   Temp:  36.2 °C (97.2 °F) 36.7 °C (98 °F) 36.7 °C (98 °F) 36.5 °C (97.7 °F)   Resp: 16 16 16 18   Height:       Weight:       SpO2: 94% 93% 90% 96%     General: Patient in no acute distress, pleasant and cooperative.  HEENT: Normocephalic, no signs of acute trauma.    Neck: supple, no meningeal signs or carotid bruits. There is normal range of motion. No tenderness on exam.    Chest: clear to auscultation. No cough.    CV: RRR, no murmurs.    Skin: no signs of acute rashes or trauma.    Musculoskeletal: joints exhibit full range of motion, without any pain to palpation. There are no signs of joint or muscle swelling. There is no tenderness to deep palpation of muscles.    Psychiatric: No hallucinatory behavior. Denies symptoms of depression or suicidal ideation. Mood and affect appear normal on exam.     NEUROLOGICAL EXAM:    Mental status, orientation: Awake, alert and fully oriented.    Speech and language: speech is clear and fluent. The patient is able to name, repeat and comprehend.    Memory: There is intact recollection of recent and remote events.    Cranial nerve exam: Pupils are 3-4 mm bilaterally and equally reactive to light and accommodation. Visual fields are intact by confrontation. There is no nystagmus on primary or secondary gaze. Intact full EOM in all directions of gaze. Face appears symmetric. Sensation in the face is intact to light touch. Uvula is midline. Palate elevates symmetrically. Tongue is midline and without any signs of tongue biting or fasciculations. Sternocleidomastoid muscles exhibit is normal strength bilaterally. Shoulder shrug is intact bilaterally.    Motor exam: Strength is 5/5 in all extremities. Tone is normal. No abnormal movements were seen on exam.    Sensory exam reveals normal sense of light touch in all extremities.    Deep tendon reflexes:  2+ throughout. Plantar responses are flexor. There is no clonus.    Coordination: shows a normal finger-nose-finger. Normal rapidly  alternating movements.    Gait: The patient was able to get up from seated position on first attempt without requiring assistance. Found to be steady when walking. Movements were fluid with normal arm swing. The patient was able to turn without difficulties or tendency to fall. Romberg exam unremarkable.          ANCILLARY DATA REVIEWED:     Lab Data Review:  Recent Results (from the past 24 hour(s))   CBC WITH DIFFERENTIAL    Collection Time: 02/09/17  2:46 AM   Result Value Ref Range    WBC 10.8 4.8 - 10.8 K/uL    RBC 4.81 4.20 - 5.40 M/uL    Hemoglobin 14.5 12.0 - 16.0 g/dL    Hematocrit 44.1 37.0 - 47.0 %    MCV 91.7 81.4 - 97.8 fL    MCH 30.1 27.0 - 33.0 pg    MCHC 32.9 (L) 33.6 - 35.0 g/dL    RDW 41.7 35.9 - 50.0 fL    Platelet Count 353 164 - 446 K/uL    MPV 9.7 9.0 - 12.9 fL    Neutrophils-Polys 56.40 44.00 - 72.00 %    Lymphocytes 33.80 22.00 - 41.00 %    Monocytes 7.50 0.00 - 13.40 %    Eosinophils 1.40 0.00 - 6.90 %    Basophils 0.50 0.00 - 1.80 %    Immature Granulocytes 0.40 0.00 - 0.90 %    Nucleated RBC 0.00 /100 WBC    Neutrophils (Absolute) 6.07 2.00 - 7.15 K/uL    Lymphs (Absolute) 3.64 1.00 - 4.80 K/uL    Monos (Absolute) 0.81 0.00 - 0.85 K/uL    Eos (Absolute) 0.15 0.00 - 0.51 K/uL    Baso (Absolute) 0.05 0.00 - 0.12 K/uL    Immature Granulocytes (abs) 0.04 0.00 - 0.11 K/uL    NRBC (Absolute) 0.00 K/uL   COMP METABOLIC PANEL    Collection Time: 02/09/17  2:46 AM   Result Value Ref Range    Sodium 136 135 - 145 mmol/L    Potassium 3.8 3.6 - 5.5 mmol/L    Chloride 105 96 - 112 mmol/L    Co2 24 20 - 33 mmol/L    Anion Gap 7.0 0.0 - 11.9    Glucose 105 (H) 65 - 99 mg/dL    Bun 15 8 - 22 mg/dL    Creatinine 0.79 0.50 - 1.40 mg/dL    Calcium 9.5 8.5 - 10.5 mg/dL    AST(SGOT) 21 12 - 45 U/L    ALT(SGPT) 45 2 - 50 U/L    Alkaline Phosphatase 83 30 - 99 U/L    Total Bilirubin 0.3 0.1 - 1.5 mg/dL    Albumin 4.6 3.2 - 4.9 g/dL    Total Protein 7.4 6.0 - 8.2 g/dL    Globulin 2.8 1.9 - 3.5 g/dL    A-G Ratio  1.6 g/dL   ESTIMATED GFR    Collection Time: 02/09/17  2:46 AM   Result Value Ref Range    GFR If African American >60 >60 mL/min/1.73 m 2    GFR If Non African American >60 >60 mL/min/1.73 m 2   VITAMIN D,25 HYDROXY    Collection Time: 02/09/17  2:47 AM   Result Value Ref Range    25-Hydroxy   Vitamin D 25 11 (L) 30 - 100 ng/mL   VITAMIN B12    Collection Time: 02/09/17  2:47 AM   Result Value Ref Range    Vitamin B12 -True Cobalamin 583 211 - 911 pg/mL   FOLATE    Collection Time: 02/09/17  2:47 AM   Result Value Ref Range    Folate -Folic Acid 14.0 >4.0 ng/mL   FERRITIN    Collection Time: 02/09/17  2:47 AM   Result Value Ref Range    Ferritin 58.1 10.0 - 291.0 ng/mL       Imaging:    CTB 11/18/15         No evidence of acute intracranial process.                               MRI brain w/wo, sz protocol, 10/4/16:  1. Agenesis of the septum pellucidum.  2. No evidence of mass lesion, heterotopic gray matter, gross cortical dysplasia or mesial temporal sclerosis.  3. There are rare scattered punctate areas of of increased T2 signal intensity in the periventricular and subcortical white matter consistent with areas of chronic microvascular ischemic change, demyelination, or gliosis.  (Personally reviewed images and shown to pt, aware since younger about agenesis of septum).      EEG, 24 hrs amb EEG, 11/22/16:  This is a normal 24 hours ambulatory electroencephalogram recording in the awake, drowsy and sleep state.  No events were captured during the study. Clinical correlation is recommended.  Note: A normal electroencephalogram does not rule out epilepsy.       VEEG overnight:   No events. No seizures. Normal.             ASSESSMENT AND PLAN:    1. Localization-related epilepsy (HCC)    2. Insomnia, unspecified type  Uses melatonin 10 mg ER.     3. Abnormal finding on MRI of brain    4. Abnormal skin sensation. New onset. No back or neck pain. Limited to skin between knees and ankles. No clear rash. Benadryl did  not help. Does not want MRI's. Vitamin levels and ferritin ok. Suggestive of RLS, urge to constantly move legs. Symptoms resolved after started last night on neurontin 300 mg po qhs, side effects discussed. No side effects so far. Wants to continue with it.         CLINICAL DISCUSSION:  Side effects of anxiety and depression (not suicidal) on keppra, these are now improved since being on lamictal. Mood is now good after lamictal was added. We have been holding Keppra since admission, no seizures so far, wants to stay without Keppra if she can, did not like it.   Continues on Lamictal  mg tabs, 2 tabs po daily in am.    After discussing further with pt and family, most spells may be in keeping with a convulsive syncope?    Normal 24 hrs amb EEG, and normal VEEG so far, no spells either.    Agenesis of septum pellucidum on MRI brain, mild WM changes.  Blood work ok, except has vitamin D deficiency, will start supplementation with 35268 u cap weekly.     Lamictal level is pending and so is HBA1C.   Fasting BS was mildly elevated, recommend she follows up with PCP.   RLS? Doing well on low dose neurontin of 300 mg po qhs.     Needs contraception, recommend IUD (bare metal). Extensive conversation about risk of epilepsy (particurly not well controlled) with pregnancy and also fetal complications related to epilepsy and AED's. Lamictal may be the safest. They agree she is not ready for pregnancy, understands we must plan for a minimum of six months before. Planning for bare metal IUD.    Insomnia may be a side effect of lamictal, melatonin 10 mg bilayer, take two hrs prior to bedtime is helping.    Continue to refrain from driving for now.       Nature of EMU admission discussed.    Fall precautions discussed, should not get out of bed by herself.  Seizure precautions.    She agrees to continue to hold keppra. Remains on lamictal unchanged. Aware of risk of having further seizures and their consequences.    Agrees  with lovenox for DVT prophylaxis.    Continue with VEEG.     If event, RN instructed to measure vitals and BS as well, father concerned about possible hypoglycemia.         FOLLOW-UP:    After EMU.       EDUCATION AND COUNSELING:  -Education was provided to the patient and/or family regarding diagnosis and prognosis. The chronic and unpredictable nature of the condition were discussed. There is increased risk for additional events, which may carry potential for significant injuries and death.    -We reviewed the current antiepileptic regimen. Potential side effects of antiepileptics were discussed at length, including but no limited to: hypersensitivity reactions (rash and others, some of which can be fatal), visual field changes (some of which may be irreversible), glaucoma, diplopia, kidney stones, osteopenia/osteoporosis/bone fractures, hyperthermia/anhydrosis, tremors/abnormal movements, ataxia, dizziness, fatigue, increased risk for falls, cardiac arrhythmias/syncope, gastrointestinal side effects(hepatitis, pancreatitis, gastritis, ulcers), gingival hypertrophy/bleeding, drowsiness, sedation, anxiety/nervousness, increased risk for suicide, increased risk for depression, and psychosis.    -We reviewed drug-drug interactions and their potential effect on seizure control and medication side effects.     -We also reviewed potential effects of seizures, epilepsy, and medications during pregnancy, including but not limited to fetal malformations, child developmental/intellectual disability, fetal/ risk for hemorrhages, stillbirth, maternal death, premature birth and others. The patient/family aware that pregnancy should be avoided, unless desired, in which case we recommend discussing with us at least a year prior to planned conception. To avoid undesired pregnancy while on antiepileptics, we recommend dual contraception.    -Folic acid 2 mg is recommended for all females in childbearing age (12-44 years of  age).    -Recommend vitamin D supplementation.    -Patient/family educated on risk for SUDEP (Sudden Death in Epilepsy). Counseling was provided on the importance of strict medication and follow up compliance. The patient/family understand the risks associated with non-adherence with the medical plan as outlined, including but not limited to an increased risk for breakthrough seizures, which may contribute to injuries, disability, status epilepticus, and even death.    -Counseling was also provided on potential effects of alcohol and other drugs, which may lower seizure threshold and/or affect the metabolism of antiepileptic drugs. We recommend avoidance of alcohol and illegal drugs.  -We extensively discussed the aspects related to safety in drivers who suffer from epilepsy. The patient is encourage to report to the Division of Motor Vehicles of any condition and/or spells related to confusion, disorientation, and/or loss of awareness and/or loss of consciousness; as these may pose a safety issue if they occur while operating a motor vehicle. The patient and/or family are ultimately responsible for exercising caution and abiding to regulations in place.    -Other seizure precautions were discussed at length, including no diving, no skydiving, no climbing or exposure to unprotected heights, no unsupervised swimming, no Jacuzzi or bathing in bathtubs or deep bodies of water.     Patient agrees with plan, as outlined.                                 Mk Butterfield MD  Medical Director, Epilepsy and Neurodiagnostics.   Clinical  of Neurology [facility].   Diplomate in Neurology, Epilepsy, and Electrodiagnostic Medicine.   Office: [phone]  Fax: [phone]

## 2017-02-09 NOTE — CARE PLAN
Problem: Safety  Goal: Will remain free from falls  Outcome: PROGRESSING AS EXPECTED  Bed alarm on and pt calls appropriately.    Problem: Venous Thromboembolism (VTW)/Deep Vein Thrombosis (DVT) Prevention:  Goal: Patient will participate in Venous Thrombosis (VTE)/Deep Vein Thrombosis (DVT)Prevention Measures  Intervention: Encourage patient to perform ankle flex, foot rotation, and knee flex exercises in addition to other prophylatic measures every hour while awake  Pt refuses SCDs but does ankle flex.

## 2017-02-10 VITALS
BODY MASS INDEX: 38.3 KG/M2 | RESPIRATION RATE: 18 BRPM | WEIGHT: 190 LBS | TEMPERATURE: 98.6 F | DIASTOLIC BLOOD PRESSURE: 65 MMHG | HEART RATE: 97 BPM | HEIGHT: 59 IN | SYSTOLIC BLOOD PRESSURE: 115 MMHG | OXYGEN SATURATION: 97 %

## 2017-02-10 PROBLEM — R55 SYNCOPE AND COLLAPSE: Chronic | Status: ACTIVE | Noted: 2017-02-10

## 2017-02-10 LAB — LAMOTRIGINE SERPL-MCNC: 6.8 UG/ML (ref 2.5–15)

## 2017-02-10 PROCEDURE — A9270 NON-COVERED ITEM OR SERVICE: HCPCS | Performed by: PSYCHIATRY & NEUROLOGY

## 2017-02-10 PROCEDURE — 700102 HCHG RX REV CODE 250 W/ 637 OVERRIDE(OP): Performed by: PSYCHIATRY & NEUROLOGY

## 2017-02-10 PROCEDURE — 700111 HCHG RX REV CODE 636 W/ 250 OVERRIDE (IP): Performed by: PSYCHIATRY & NEUROLOGY

## 2017-02-10 RX ORDER — GABAPENTIN 300 MG/1
300 CAPSULE ORAL EVERY EVENING
Qty: 30 CAP | Refills: 11 | Status: SHIPPED | OUTPATIENT
Start: 2017-02-10 | End: 2018-07-10

## 2017-02-10 RX ORDER — ERGOCALCIFEROL 1.25 MG/1
50000 CAPSULE ORAL
Qty: 4 CAP | Refills: 5 | Status: SHIPPED | OUTPATIENT
Start: 2017-02-10 | End: 2018-07-10

## 2017-02-10 RX ADMIN — ESCITALOPRAM OXALATE 10 MG: 10 TABLET ORAL at 10:11

## 2017-02-10 RX ADMIN — ENOXAPARIN SODIUM 40 MG: 100 INJECTION SUBCUTANEOUS at 10:11

## 2017-02-10 RX ADMIN — LAMOTRIGINE 200 MG: 100 TABLET ORAL at 10:11

## 2017-02-10 ASSESSMENT — LIFESTYLE VARIABLES: EVER_SMOKED: NEVER

## 2017-02-10 NOTE — PROGRESS NOTES
Patient discharged to home. Patient walked out of hospital with family member. Discharge instructions reviewed and questions answered.

## 2017-02-10 NOTE — CARE PLAN
Problem: Venous Thromboembolism (VTW)/Deep Vein Thrombosis (DVT) Prevention:  Goal: Patient will participate in Venous Thrombosis (VTE)/Deep Vein Thrombosis (DVT)Prevention Measures  Outcome: PROGRESSING AS EXPECTED  Pt given Lovenox for DVT/VTE prophylaxis.    Problem: Bowel/Gastric:  Goal: Normal bowel function is maintained or improved  Outcome: PROGRESSING AS EXPECTED  Goal: Will not experience complications related to bowel motility  Outcome: PROGRESSING AS EXPECTED

## 2017-02-10 NOTE — CARE PLAN
Problem: Venous Thromboembolism (VTW)/Deep Vein Thrombosis (DVT) Prevention:  Goal: Patient will participate in Venous Thrombosis (VTE)/Deep Vein Thrombosis (DVT)Prevention Measures  Intervention: Encourage patient to perform ankle flex, foot rotation, and knee flex exercises in addition to other prophylatic measures every hour while awake  Pt educated to do ankle flexes.

## 2017-02-10 NOTE — DISCHARGE INSTRUCTIONS
Discharge Instructions    Discharged to home by car with relative. Discharged via walking, hospital escort: Refused.  Special equipment needed: Not Applicable    Be sure to schedule a follow-up appointment with your primary care doctor or any specialists as instructed.     Discharge Plan:   Diet Plan: Discussed  Activity Level: Discussed  Confirmed Follow up Appointment: Patient to Call and Schedule Appointment  Confirmed Symptoms Management: Discussed  Medication Reconciliation Updated: Yes  Influenza Vaccine Indication: Patient Refuses    I understand that a diet low in cholesterol, fat, and sodium is recommended for good health. Unless I have been given specific instructions below for another diet, I accept this instruction as my diet prescription.   Other diet: Regular    Special Instructions: None    · Is patient discharged on Warfarin / Coumadin?   No     · Is patient Post Blood Transfusion?  No    Depression / Suicide Risk    As you are discharged from this [facility] facility, it is important to learn how to keep safe from harming yourself.    Recognize the warning signs:  · Abrupt changes in personality, positive or negative- including increase in energy   · Giving away possessions  · Change in eating patterns- significant weight changes-  positive or negative  · Change in sleeping patterns- unable to sleep or sleeping all the time   · Unwillingness or inability to communicate  · Depression  · Unusual sadness, discouragement and loneliness  · Talk of wanting to die  · Neglect of personal appearance   · Rebelliousness- reckless behavior  · Withdrawal from people/activities they love  · Confusion- inability to concentrate     If you or a loved one observes any of these behaviors or has concerns about self-harm, here's what you can do:  · Talk about it- your feelings and reasons for harming yourself  · Remove any means that you might use to hurt yourself (examples: pills, rope, extension cords,  firearm)  · Get professional help from the community (Mental Health, Substance Abuse, psychological counseling)  · Do not be alone:Call your Safe Contact- someone whom you trust who will be there for you.  · Call your local CRISIS HOTLINE 049-2881 or 654-125-4612  · Call your local Children's Mobile Crisis Response Team [place] (420) 543-2141 or www.NuPathe  · Call the toll free National Suicide Prevention Hotlines   · National Suicide Prevention Lifeline 721-887-FWAC (6125)  · National Hope Line Network 800-SUICIDE (935-6788)

## 2017-02-10 NOTE — PROGRESS NOTES
Assumed pt care at 1900. Received report from day RN. Assessment completed. Pt A&Ox4. Pain 0/10. Bed in lowest position, locked, and bed alarm is on. Pt calls appropriately. Treaded socks in place, call light within reach and staff numbers provided. No seizures on day shift will continue to monitor. Pt needs met at this time.

## 2017-02-11 NOTE — DISCHARGE SUMMARY
No chief complaint on file.      Problem List Items Addressed This Visit     * (Principal)Syncope and collapse (Chronic)    Relevant Medications    enoxaparin (LOVENOX) inj 40 mg    Other Relevant Orders    REFERRAL TO CARDIOLOGY          DISCHARGE SUMMARY:  Orin White is a 21 y.o. female who had been electively admitted to the epilepsy monitoring unit from 2/6/17 through 2/10/17 in attempt to capture one of her episodes of loss of consciousness and convulsions.     keppra was held since admission and later decided to discontinued so it was never reinstated at discharge.     She remained on lamictal  mg daily throughout her admission.     On her day two of admission, she complained of a discomfort in her legs and urge to constantly move them. Benadryl did not help. She refused MRIs. Her exam was normal. A trial of neurontin 300 mg po qhs was of great benefit and since it is also an antiepileptic she decided to continue with it.     She had no spells despite daily self sleep deprivation and holding her keppra since admission.     Her father raised concerned for possible hypoglycemia or low blood pressure to account for spells.     She was discharged in stable condition to her family.     She is not driving for now.     She is not depressed or suicidal.        Past medical history:   Past Medical History   Diagnosis Date   • Seizure (CMS-[facility])      since October   • Epilepsy (CMS-HCC)    • Depression        Past surgical history:   No past surgical history on file.    Family history:   Family History   Problem Relation Age of Onset   • Diabetes Maternal Grandmother        Social history:   Social History     Social History   • Marital Status: Single     Spouse Name: N/A   • Number of Children: N/A   • Years of Education: N/A     Occupational History   • Not on file.     Social History Main Topics   • Smoking status: Never Smoker    • Smokeless tobacco: Never Used   • Alcohol Use: 0.0 oz/week     0 Standard  drinks or equivalent per week      Comment: occ.   • Drug Use: No   • Sexual Activity: Not Currently     Other Topics Concern   • Not on file     Social History Narrative    ** Merged History Encounter **         [employer] -senior    Lives with Mom, sisters, brother               Current medications:   Current Facility-Administered Medications   Medication Dose   • vitamin D (Ergocalciferol) (DRISDOL) capsule 50,000 Units  50,000 Units   • gabapentin (NEURONTIN) capsule 300 mg  300 mg   • diphenhydrAMINE (BENADRYL) injection 25 mg  25 mg   • escitalopram (LEXAPRO) tablet 10 mg  10 mg   • lorazepam (ATIVAN) injection 1 mg  1 mg   • lamotrigine (LAMICTAL) tablet 200 mg  200 mg   • enoxaparin (LOVENOX) inj 40 mg  40 mg     Current Outpatient Prescriptions   Medication   • gabapentin (NEURONTIN) 300 MG Cap   • vitamin D, Ergocalciferol, (DRISDOL) 46002 UNITS Cap capsule   • escitalopram (LEXAPRO) 10 MG Tab   • ibuprofen (MOTRIN) 200 MG Tab   • Melatonin 10 MG Tab CR   • LamoTRIgine 200 MG TABLET SR 24 HR       Medication Allergy:  No Known Allergies    Review of systems:   Constitutional: denies fever, night sweats, weight loss.    Eyes: denies acute vision change, eye pain or secretion.    Ears, Nose, Mouth, Throat: denies nasal secretion, nasal bleeding, difficulty swallowing, hearing loss, tinnitus, vertigo, ear pain, acute dental problems, oral ulcers or lesions.    Endocrine: denies recent weight changes, heat or cold intolerance, polyuria, polydypsia, polyphagia,abnormal hair growth.  Cardiovascular: denies new onset of chest pain, palpitations, syncope, or dyspnea of exertion.  Pulmonary: denies shortness of breath, new onset of cough, hemoptysis, wheezing, chest pain or flu-like symptoms.    GI: denies nausea, vomiting, diarrhea, GI bleeding, change in appetite, abdominal pain, and change in bowel habits.  : denies dysuria, urinary incontinence, hematuria.  Heme/oncology: denies history of easy bruising or  bleeding. No history of cancer, DVTor PE.  Allergy/immunology: denies hives/urticaria, or itching.    Dermatologic: denies new rash, or new skin lesions.  Musculoskeletal:denies joint swelling or pain, muscle pain, neck and back pain.    Neurologic: denies headaches, acute visual changes, facial droopiness, muscle weakness (focal or generalized), anesthesia, ataxia, change in speech or language, memory loss, abnormal movements, seizures, loss of consciousness, or episodes of confusion.    Psychiatric: denies symptoms of depression, anxiety, hallucinations, mood swings or changes, suicidal or homicidal thoughts.          Physical examination:   Filed Vitals:    02/10/17 0000 02/10/17 0400 02/10/17 0800 02/10/17 1200   BP: 108/57 106/49 116/66 115/65   Pulse: 84 86 79 97   Temp: 36.9 °C (98.5 °F) 36.9 °C (98.5 °F) 36.2 °C (97.1 °F) 37 °C (98.6 °F)   Resp: 18 18 18 18   Height:       Weight:       SpO2: 98% 99% 96% 97%     General: Patient in no acute distress, pleasant and cooperative.  HEENT: Normocephalic, no signs of acute trauma.    Neck: supple, no meningeal signs or carotid bruits. There is normal range of motion. No tenderness on exam.    Chest: clear to auscultation. No cough.    CV: RRR, no murmurs.    Skin: no signs of acute rashes or trauma.    Musculoskeletal: joints exhibit full range of motion, without any pain to palpation. There are no signs of joint or muscle swelling. There is no tenderness to deep palpation of muscles.    Psychiatric: No hallucinatory behavior. Denies symptoms of depression or suicidal ideation. Mood and affect appear normal on exam.     NEUROLOGICAL EXAM:    Mental status, orientation: Awake, alert and fully oriented.    Speech and language: speech is clear and fluent. The patient is able to name, repeat and comprehend.    Memory: There is intact recollection of recent and remote events.    Cranial nerve exam: Pupils are 3-4 mm bilaterally and equally reactive to light and  accommodation. Visual fields are intact by confrontation. There is no nystagmus on primary or secondary gaze. Intact full EOM in all directions of gaze. Face appears symmetric. Sensation in the face is intact to light touch. Uvula is midline. Palate elevates symmetrically. Tongue is midline and without any signs of tongue biting or fasciculations. Sternocleidomastoid muscles exhibit is normal strength bilaterally. Shoulder shrug is intact bilaterally.    Motor exam: Strength is 5/5 in all extremities. Tone is normal. No abnormal movements were seen on exam.    Sensory exam reveals normal sense of light touch in all extremities.    Deep tendon reflexes:  2+ throughout. Plantar responses are flexor. There is no clonus.    Coordination: shows a normal finger-nose-finger. Normal rapidly alternating movements.    Gait: The patient was able to get up from seated position on first attempt without requiring assistance. Found to be steady when walking. Movements were fluid with normal arm swing. The patient was able to turn without difficulties or tendency to fall. Romberg exam unremarkable.            ANCILLARY DATA REVIEWED:     Lab Data Review:  Vitamin D deficiency.   Elevated HBA1C.       Imaging:    CTB 11/18/15          No evidence of acute intracranial process.                                   MRI brain w/wo, sz protocol, 10/4/16:  1. Agenesis of the septum pellucidum.  2. No evidence of mass lesion, heterotopic gray matter, gross cortical dysplasia or mesial temporal sclerosis.  3. There are rare scattered punctate areas of of increased T2 signal intensity in the periventricular and subcortical white matter consistent with areas of chronic microvascular ischemic change, demyelination, or gliosis.  (Personally reviewed images and shown to pt, aware since younger about agenesis of septum).      EEG, 24 hrs amb EEG, 11/22/16:  This is a normal 24 hours ambulatory electroencephalogram recording in the awake, drowsy and  sleep state.  No events were captured during the study. Clinical correlation is recommended.  Note: A normal electroencephalogram does not rule out epilepsy.       VEEG overnight:   No events. No seizures. Normal.             ASSESSMENT AND PLAN:    1. Localization-related epilepsy (HCC)    2. Insomnia, unspecified type  Uses melatonin 10 mg ER.     3. Abnormal finding on MRI of brain    4. Abnormal skin sensation. New onset. No back or neck pain. Limited to skin between knees and ankles. No clear rash. Benadryl did not help. Does not want MRI's. Vitamin levels and ferritin ok. Suggestive of RLS, urge to constantly move legs. Symptoms resolved after started on neurontin 300 mg po qhs, side effects discussed. No side effects so far. Wants to continue with it.     5. Altered glucose metabolism.   Needs fu with PCP.     6. Vitamin D deficiency.   Ergocalciferol 07359 u weekly.     7. Syncope and collapse.   Cardiology eval.         CLINICAL DISCUSSION:  Unknown if epilepsy.   Possibly not epilepsy. Suspect convulsive syncope.   Normal 24 hrs amb EEG  5 days VEEG normal, no spells, no interictal discharges.   Continues on Lamictal  mg tabs, 2 tabs po daily in am. No side effects.   After discussing further with pt and family, most spells may be in keeping with a convulsive syncope?    Keppra discontinued.     Agenesis of septum pellucidum on MRI brain, mild WM changes.  Blood work ok, except has vitamin D deficiency, will start supplementation with 10585 u cap weekly, and mildly elevated HBA1C, needs fu with PCP.      Lamictal level is pending and so is B6.      RLS? Doing well on low dose neurontin of 300 mg po qhs.     Needs contraception, recommend IUD (bare metal). Extensive conversation about risk of epilepsy (particurly not well controlled) with pregnancy and also fetal complications related to epilepsy and AED's. Lamictal may be the safest. They agree she is not ready for pregnancy, understands we must plan  for a minimum of six months before. Planning for bare metal IUD.      Insomnia may be a side effect of lamictal, melatonin 10 mg bilayer, take two hrs prior to bedtime is helping.      Continue to refrain from driving for now.     Outpt cardiology eval (holter, echo, tilt table test), suspect convulsive syncope, normal EEG.         FOLLOW-UP:    After EMU in my office.        EDUCATION AND COUNSELING:  -Education was provided to the patient and/or family regarding diagnosis and prognosis. The chronic and unpredictable nature of the condition were discussed. There is increased risk for additional events, which may carry potential for significant injuries and death.    -We reviewed the current antiepileptic regimen. Potential side effects of antiepileptics were discussed at length, including but no limited to: hypersensitivity reactions (rash and others, some of which can be fatal), visual field changes (some of which may be irreversible), glaucoma, diplopia, kidney stones, osteopenia/osteoporosis/bone fractures, hyperthermia/anhydrosis, tremors/abnormal movements, ataxia, dizziness, fatigue, increased risk for falls, cardiac arrhythmias/syncope, gastrointestinal side effects(hepatitis, pancreatitis, gastritis, ulcers), gingival hypertrophy/bleeding, drowsiness, sedation, anxiety/nervousness, increased risk for suicide, increased risk for depression, and psychosis.    -We reviewed drug-drug interactions and their potential effect on seizure control and medication side effects.     -We also reviewed potential effects of seizures, epilepsy, and medications during pregnancy, including but not limited to fetal malformations, child developmental/intellectual disability, fetal/ risk for hemorrhages, stillbirth, maternal death, premature birth and others. The patient/family aware that pregnancy should be avoided, unless desired, in which case we recommend discussing with us at least a year prior to planned conception.  To avoid undesired pregnancy while on antiepileptics, we recommend dual contraception.    -Folic acid 2 mg is recommended for all females in childbearing age (12-44 years of age).    -Recommend vitamin D supplementation.    -Patient/family educated on risk for SUDEP (Sudden Death in Epilepsy). Counseling was provided on the importance of strict medication and follow up compliance. The patient/family understand the risks associated with non-adherence with the medical plan as outlined, including but not limited to an increased risk for breakthrough seizures, which may contribute to injuries, disability, status epilepticus, and even death.    -Counseling was also provided on potential effects of alcohol and other drugs, which may lower seizure threshold and/or affect the metabolism of antiepileptic drugs. We recommend avoidance of alcohol and illegal drugs.  -We extensively discussed the aspects related to safety in drivers who suffer from epilepsy. The patient is encourage to report to the Division of Motor Vehicles of any condition and/or spells related to confusion, disorientation, and/or loss of awareness and/or loss of consciousness; as these may pose a safety issue if they occur while operating a motor vehicle. The patient and/or family are ultimately responsible for exercising caution and abiding to regulations in place.    -Other seizure precautions were discussed at length, including no diving, no skydiving, no climbing or exposure to unprotected heights, no unsupervised swimming, no Jacuzzi or bathing in bathtubs or deep bodies of water.     Patient agrees with plan, as outlined.                                      Mk Butterfield MD  Medical Director, Epilepsy and Neurodiagnostics.   Clinical  of Neurology [facility].   Diplomate in Neurology, Epilepsy, and Electrodiagnostic Medicine.   Office: [phone]  Fax: [phone]      I spent over 30 mins  discharging pt today.

## 2017-02-11 NOTE — PROGRESS NOTES
Monitor Summary: SR 71-97, IL 0.18, QRS 0.08, QT 0.40 with no ectooy per strip from monitor room    Pt dc'd from monitor at 1444.

## 2017-02-12 LAB — VIT B6 SERPL-MCNC: 27.5 NMOL/L (ref 20–125)

## 2017-06-06 ENCOUNTER — APPOINTMENT (OUTPATIENT)
Dept: RADIOLOGY | Facility: MEDICAL CENTER | Age: 22
End: 2017-06-06
Attending: EMERGENCY MEDICINE
Payer: COMMERCIAL

## 2017-06-06 ENCOUNTER — HOSPITAL ENCOUNTER (EMERGENCY)
Facility: MEDICAL CENTER | Age: 22
End: 2017-06-06
Attending: EMERGENCY MEDICINE
Payer: COMMERCIAL

## 2017-06-06 VITALS
TEMPERATURE: 99.8 F | HEIGHT: 58 IN | WEIGHT: 195 LBS | HEART RATE: 100 BPM | RESPIRATION RATE: 17 BRPM | BODY MASS INDEX: 40.93 KG/M2 | SYSTOLIC BLOOD PRESSURE: 125 MMHG | DIASTOLIC BLOOD PRESSURE: 77 MMHG | OXYGEN SATURATION: 89 %

## 2017-06-06 DIAGNOSIS — R21 RASH: ICD-10-CM

## 2017-06-06 DIAGNOSIS — G40.919 BREAKTHROUGH SEIZURE (HCC): ICD-10-CM

## 2017-06-06 DIAGNOSIS — S93.432A SPRAIN OF TIBIOFIBULAR LIGAMENT OF LEFT ANKLE, INITIAL ENCOUNTER: ICD-10-CM

## 2017-06-06 LAB — GLUCOSE BLD-MCNC: 90 MG/DL (ref 65–99)

## 2017-06-06 PROCEDURE — 36415 COLL VENOUS BLD VENIPUNCTURE: CPT

## 2017-06-06 PROCEDURE — 304562 HCHG STAT O2 MASK/CANNULA

## 2017-06-06 PROCEDURE — 99284 EMERGENCY DEPT VISIT MOD MDM: CPT

## 2017-06-06 PROCEDURE — 700102 HCHG RX REV CODE 250 W/ 637 OVERRIDE(OP): Performed by: EMERGENCY MEDICINE

## 2017-06-06 PROCEDURE — 73610 X-RAY EXAM OF ANKLE: CPT | Mod: LT

## 2017-06-06 PROCEDURE — 304561 HCHG STAT O2

## 2017-06-06 PROCEDURE — A9270 NON-COVERED ITEM OR SERVICE: HCPCS | Performed by: EMERGENCY MEDICINE

## 2017-06-06 PROCEDURE — 82962 GLUCOSE BLOOD TEST: CPT

## 2017-06-06 RX ORDER — LEVETIRACETAM 500 MG/1
500 TABLET ORAL 2 TIMES DAILY
Qty: 60 TAB | Refills: 3 | Status: SHIPPED | OUTPATIENT
Start: 2017-06-06 | End: 2019-09-17

## 2017-06-06 RX ORDER — LEVETIRACETAM 500 MG/1
500 TABLET ORAL ONCE
Status: COMPLETED | OUTPATIENT
Start: 2017-06-06 | End: 2017-06-06

## 2017-06-06 RX ORDER — ESCITALOPRAM OXALATE 10 MG/1
10 TABLET ORAL DAILY
Qty: 30 TAB | Refills: 0 | Status: SHIPPED | OUTPATIENT
Start: 2017-06-06 | End: 2018-07-10

## 2017-06-06 RX ORDER — LAMOTRIGINE 100 MG/1
100 TABLET ORAL DAILY
Status: DISCONTINUED | OUTPATIENT
Start: 2017-06-06 | End: 2017-06-07 | Stop reason: HOSPADM

## 2017-06-06 RX ORDER — BENZOCAINE/MENTHOL 6 MG-10 MG
2 LOZENGE MUCOUS MEMBRANE 4 TIMES DAILY
Qty: 1 TUBE | Refills: 0 | Status: SHIPPED | OUTPATIENT
Start: 2017-06-06 | End: 2018-07-10

## 2017-06-06 RX ORDER — LAMOTRIGINE 200 MG/1
1 TABLET, EXTENDED RELEASE ORAL DAILY
Qty: 30 TAB | Refills: 0 | Status: SHIPPED | OUTPATIENT
Start: 2017-06-06 | End: 2018-07-10

## 2017-06-06 RX ADMIN — LEVETIRACETAM 500 MG: 500 TABLET, FILM COATED ORAL at 20:05

## 2017-06-06 RX ADMIN — LAMOTRIGINE 100 MG: 100 TABLET ORAL at 20:15

## 2017-06-06 ASSESSMENT — PAIN SCALES - GENERAL
PAINLEVEL_OUTOF10: 4
PAINLEVEL_OUTOF10: 5

## 2017-06-06 ASSESSMENT — LIFESTYLE VARIABLES: DO YOU DRINK ALCOHOL: NO

## 2017-06-06 NOTE — ED AVS SNAPSHOT
Resverlogix Access Code: Activation code not generated  Current Resverlogix Status: Active    [employer] Inchart  A secure, online tool to manage your health information     [employer]’s Resverlogix® is a secure, online tool that connects you to your personalized health information from the privacy of your home -- day or night - making it very easy for you to manage your healthcare. Once the activation process is completed, you can even access your medical information using the Resverlogix janina, which is available for free in the Apple Janina store or Google Play store.     Resverlogix provides the following levels of access (as shown below):   My Chart Features   [facility] Primary Care Doctor [facility]  Specialists [facility]  Urgent  Care Non-[facility]  Primary Care  Doctor   Email your healthcare team securely and privately 24/7 X X X X   Manage appointments: schedule your next appointment; view details of past/upcoming appointments X      Request prescription refills. X      View recent personal medical records, including lab and immunizations X X X X   View health record, including health history, allergies, medications X X X X   Read reports about your outpatient visits, procedures, consult and ER notes X X X X   See your discharge summary, which is a recap of your hospital and/or ER visit that includes your diagnosis, lab results, and care plan. X X       How to register for Resverlogix:  1. Go to  [email or web address].  2. Click on the Sign Up Now box, which takes you to the New Member Sign Up page. You will need to provide the following information:  a. Enter your Resverlogix Access Code exactly as it appears at the top of this page. (You will not need to use this code after you’ve completed the sign-up process. If you do not sign up before the expiration date, you must request a new code.)   b. Enter your date of birth.   c. Enter your home email address.   d. Click Submit, and follow the next screen’s instructions.  3. Create a Resverlogix ID. This will  be your [employer] login ID and cannot be changed, so think of one that is secure and easy to remember.  4. Create a [employer]ault password. You can change your password at any time.  5. Enter your Password Reset Question and Answer. This can be used at a later time if you forget your password.   6. Enter your e-mail address. This allows you to receive e-mail notifications when new information is available in [employer].  7. Click Sign Up. You can now view your health information.    For assistance activating your [employer] account, call [phone]

## 2017-06-06 NOTE — ED AVS SNAPSHOT
6/6/2017    Orin White  [address]   [place] [address]    Dear Orin:    [facility] wants to ensure your discharge home is safe and you or your loved ones have had all of your questions answered regarding your care after you leave the hospital.    Below is a list of resources and contact information should you have any questions regarding your hospital stay, follow-up instructions, or active medical symptoms.    Questions or Concerns Regarding… Contact   Medical Questions Related to Your Discharge  (7 days a week, 8am-5pm) Contact a Nurse Care Coordinator   [phone]   Medical Questions Not Related to Your Discharge  (24 hours a day / 7 days a week)  Contact the Nurse Health Line   [phone]    Medications or Discharge Instructions Refer to your discharge packet   or contact your [facility] Primary Care Provider   [phone]   Follow-up Appointment(s) Schedule your appointment via allyDVM   or contact Scheduling [phone]   Billing Review your statement via allyDVM  or contact Billing [phone]   Medical Records Review your records via allyDVM   or contact Medical Records [phone]     You may receive a telephone call within two days of discharge. This call is to make certain you understand your discharge instructions and have the opportunity to have any questions answered. You can also easily access your medical information, test results and upcoming appointments via the allyDVM free online health management tool. You can learn more and sign up at [email or web address]. For assistance setting up your allyDVM account, please call [phone].    Once again, we want to ensure your discharge home is safe and that you have a clear understanding of any next steps in your care. If you have any questions or concerns, please do not hesitate to contact us, we are here for you. Thank you for choosing [facility] for your healthcare needs.    Sincerely,    Your [facility] Healthcare Team

## 2017-06-06 NOTE — ED AVS SNAPSHOT
Home Care Instructions                                                                                                                Orin White   MRN: [id number]    Department:  [facility], Emergency Dept   Date of Visit:  6/6/2017            [facility], Emergency Dept    [address]    [place] [address]    Phone:  [phone]      You were seen by     Sandro Morel M.D.      Your Diagnosis Was     Breakthrough seizure (CMS-HCC)     G40.919       These are the medications you received during your hospitalization from 06/06/2017 1919 to 06/06/2017 2233     Date/Time Order Dose Route Action    06/06/2017 2005 levetiracetam (KEPPRA) tablet 500 mg 500 mg Oral Given    06/06/2017 2015 lamotrigine (LAMICTAL) tablet 100 mg 100 mg Oral Given      Follow-up Information     1. Schedule an appointment as soon as possible for a visit with Reanna Garvin M.D..    Specialty:  Family Medicine    Why:  As needed    Contact information    [address] [address] [phone]        Medication Information     Review all of your home medications and newly ordered medications with your primary doctor and/or pharmacist as soon as possible. Follow medication instructions as directed by your doctor and/or pharmacist.     Please keep your complete medication list with you and share with your physician. Update the information when medications are discontinued, doses are changed, or new medications (including over-the-counter products) are added; and carry medication information at all times in the event of emergency situations.               Medication List      START taking these medications        Instructions    Morning Afternoon Evening Bedtime    hydrocortisone 1 % Crea        Apply 2 g to affected area(s) 4 times a day.   Dose:  2 g                        levetiracetam 500 MG Tabs   Last time this was given:  500 mg on 6/6/2017  8:05 PM   Commonly known as:   KEPPRA        Take 1 Tab by mouth 2 times a day.   Dose:  500 mg                          ASK your doctor about these medications        Instructions    Morning Afternoon Evening Bedtime    * escitalopram 10 MG Tabs   What changed:  Another medication with the same name was added. Make sure you understand how and when to take each.   Commonly known as:  LEXAPRO   Ask about: Which instructions should I use?        Take 10 mg by mouth every day.   Dose:  10 mg                        * escitalopram 10 MG Tabs   What changed:  You were already taking a medication with the same name, and this prescription was added. Make sure you understand how and when to take each.   Commonly known as:  LEXAPRO   Ask about: Which instructions should I use?        Take 1 Tab by mouth every day.   Dose:  10 mg                        gabapentin 300 MG Caps   Commonly known as:  NEURONTIN        Take 1 Cap by mouth every evening.   Dose:  300 mg                        ibuprofen 200 MG Tabs   Commonly known as:  MOTRIN        Take 400 mg by mouth every 6 hours as needed for Mild Pain.   Dose:  400 mg                        * LamoTRIgine 200 MG Tb24   What changed:  Another medication with the same name was added. Make sure you understand how and when to take each.   Ask about: Which instructions should I use?        Take 400 mg by mouth every day.   Dose:  400 mg                        * LamoTRIgine 200 MG Tb24   What changed:  You were already taking a medication with the same name, and this prescription was added. Make sure you understand how and when to take each.   Ask about: Which instructions should I use?        Take 1 Cap by mouth every day.   Dose:  1 Cap                        Melatonin 10 MG Tbcr        Take 10 mg PE by mouth every bedtime.   Dose:  10 mg PE                        vitamin D (Ergocalciferol) 26366 UNITS Caps capsule   Commonly known as:  DRISDOL        Take 1 Cap by mouth every 7 days.   Dose:  16088 Units                         * Notice:  This list has 4 medication(s) that are the same as other medications prescribed for you. Read the directions carefully, and ask your doctor or other care provider to review them with you.         Where to Get Your Medications      You can get these medications from any pharmacy     Bring a paper prescription for each of these medications    - escitalopram 10 MG Tabs  - hydrocortisone 1 % Crea  - LamoTRIgine 200 MG Tb24  - levetiracetam 500 MG Tabs            Procedures and tests performed during your visit     ACCU-CHEK    ACCU-CHEK GLUCOSE    DX-ANKLE 3+ VIEWS LEFT    NURSING COMMUNICATION        Discharge Instructions       Seizures     Return for back to back seizures or seizure longer than 10 minutes.  Return for ill appearance, fever or injury.  Followup with your doctor.  Do not drive for 3 months until cleared by neurology.  During a seizure ease the patient to the floor, move objects away from him to prevent injury, put nothing in his mouth. Do not allow yourself to run out of medications. Use hydrocortisone for rash. Take ibuprofen and Tylenol for ankle pain..     You had a seizure.  About 2% of the population will have a seizure problem during their life.  Sometimes the cause for the seizure is not known.  Seizures are most often associated with one of these problems:  Ø Epilepsy.  Ø Not taking your seizure medicine.  Ø Alcohol and drug abuse.  Ø Head injury, strokes, tumors, and brain surgery.  Ø High fever and infections.  Ø Low blood sugar.     Evaluating a new seizure disorder may require having a brain scan or an EEG (brain wave test). If you have been given a seizure medicine, it is very important that you take it as prescribed.  Not taking these medicines as directed is the most common cause of seizures. Blood tests are often used to be sure you are taking the proper dose.      Seizures cause many different symptoms, from convulsions to brief blackouts. Please do not ride a  bike, drive a car, go swimming, climb in high or dangerous places such as ladders or roofs, or operate any dangerous equipment until you have your doctor's permission.  If you hold a 's license, state law may require that a report be made to the motor vehicles department.  You should wear an emergency medical identification bracelet with information about your seizures. If you have any warning that a seizure may occur, lie down in a safe place to protect yourself.     Teach your family and friends what to do if you have any further seizures. They should stay calm and try to keep you from falling on hard or sharp objects. It is best not to try to restrain a seizing person or to force anything into their mouth. Do not try to open clenched jaws.  When the seizure is over, the person should be rolled on their side to help drain any vomit or secretions from the mouth.  After a seizure the person may be confused or drowsy for several minutes and they can be reassured. An ambulance should be called if the seizure lasted more than 5 minutes or if confusion remains for more than 30 minutes.  Call your caregiver or the emergency department for further instructions.     Do not drive until cleared by your caregiver or neurologist!     Document Released: 01/25/2006  Document Re-Released: 10/14/2008  [employer]® Patient Information ©2009 [employer].            Patient Information     Patient Information    Following emergency treatment: all patient requiring follow-up care must return either to a private physician or a clinic if your condition worsens before you are able to obtain further medical attention, please return to the emergency room.     Billing Information    At [facility], we work to make the billing process streamlined for our patients.  Our Representatives are here to answer any questions you may have regarding your hospital bill.  If you have insurance coverage and have supplied your insurance information  to us, we will submit a claim to your insurer on your behalf.  Should you have any questions regarding your bill, we can be reached online or by phone as follows:  Online: You are able pay your bills online or live chat with our representatives about any billing questions you may have. We are here to help Monday - Friday from 8:00am to 7:30pm and 9:00am - 12:00pm on Saturdays.  Please visit [email or web address]  for more information.   Phone:  [phone] or [phone]    Please note that your emergency physician, surgeon, pathologist, radiologist, anesthesiologist, and other specialists are not employed by [facility] and will therefore bill separately for their services.  Please contact them directly for any questions concerning their bills at the numbers below:     Emergency Physician Services:  [phone]  [facility]:  [phone]  [facility]:  [phone]  [facility]:  [phone]    1. Your final bill may vary from the amount quoted upon discharge if all procedures are not complete at that time, or if your doctor has additional procedures of which we are not aware. You will receive an additional bill if you return to the Emergency Department at [facility] for suture removal regardless of the facility of which the sutures were placed.     2. Please arrange for settlement of this account at the emergency registration.    3. All self-pay accounts are due in full at the time of treatment.  If you are unable to meet this obligation then payment is expected within 4-5 days.     4. If you have had radiology studies (CT, X-ray, Ultrasound, MRI), you have received a preliminary result during your emergency department visit. Please contact the radiology department [phone] to receive a copy of your final result. Please discuss the Final result with your primary physician or with the follow up physician provided.     Crisis  Hotline:  National Crisis Hotline:  8-636-JGWWOCS or 1-642.880.3436  [place] Crisis Hotline:    1-117.270.1342 or 466-356-3520         ED Discharge Follow Up Questions    1. In order to provide you with very good care, we would like to follow up with a phone call in the next few days.  May we have your permission to contact you?     YES /  NO    2. What is the best phone number to call you? (       )_____-__________    3. What is the best time to call you?      Morning  /  Afternoon  /  Evening                   Patient Signature:  ____________________________________________________________    Date:  ____________________________________________________________

## 2017-06-07 NOTE — ED NOTES
Noted skin rash behind pt's right ear. Skin is red in color and raised bumps. Small rash also noted on pt's right shoulder.

## 2017-06-07 NOTE — ED NOTES
Pt discharged home. Explained discharge and medication instructions. Pt verbalized understanding of instructions. Pt advised to follow-up with neurologist or return to ED if symptoms worsen. VS stable. Pt w/c to lobby. Pt's mother is at bedside and will be driving pt home.

## 2017-06-07 NOTE — ED PROVIDER NOTES
ED Provider Note    Scribed for Sandro Morel M.D. by Bryanna Grey. 6/6/2017  7:42 PM    Primary care provider: Reanna Garvin M.D.  Means of arrival: Ambulance   History obtained from: Patient   History limited by: None     CHIEF COMPLAINT  Chief Complaint   Patient presents with   • Seizure     Per EMS, pt was at [facility] and had a tonic clonic seizure. Pt's friends state it last approx 90 sec. Pt states she has hx of epilepsy and has a seizure about once a month. Pt states she takes medications but unsure of medication name.       HPI  Orin White is a 21 y.o. female with a history of epilepsy who presents to the ED after having a seizure this evening just prior to arrival in the ED. Per EMS, the patient was at a [facility] when she had a witnessed tonic clonic seizure. Friend states that this seizure lasted for about 90 seconds. Per patient, she felt this seizure coming on but was unable to tell anyone before her seizure began. She bit her tongue during the seizure. She denies incontinence or head injury. The patient reports that she has a seizure about once per month. Per patient, she takes Keppra and Lamictal but ran out of her medications last week and cannot afford to refill this prescription as she lost her insurance. Her seizure today was typical of her seizures. She reports left ankle pain and thinks she may have twisted her ankle during her seizure. Patient denies fevers, chills, nausea, vomiting, diarrhea, abdominal pain, chest pain, shortness of breath, cough.      REVIEW OF SYSTEMS  Pertinent positives include: seizure, left ankle pain.  Pertinent negatives include: fevers, chills, nausea, vomiting, diarrhea, abdominal pain, chest pain, shortness of breath, cough, incontinence, head injury.  10+ systems reviewed and negative.     C     PAST MEDICAL HISTORY  Past Medical History   Diagnosis Date   • Seizure (CMS-[facility])      since October   • Epilepsy (CMS-Formerly [facility])    • Depression        FAMILY  "HISTORY  Family History   Problem Relation Age of Onset   • Diabetes Maternal Grandmother        SOCIAL HISTORY  Social History   Substance Use Topics   • Smoking status: Never Smoker    • Smokeless tobacco: Never Used   • Alcohol Use: 0.0 oz/week     0 Standard drinks or equivalent per week      Comment: occ.     History   Drug Use No       CURRENT MEDICATIONS  Home Medications     Reviewed by Shantanu Whitley R.N. (Registered Nurse) on 06/06/17 at 1936  Med List Status: Complete    Medication Last Dose Status    escitalopram (LEXAPRO) 10 MG Tab 5/31/2017 Active    gabapentin (NEURONTIN) 300 MG Cap 5/31/2017 Active    ibuprofen (MOTRIN) 200 MG Tab  Active    LamoTRIgine 200 MG TABLET SR 24 HR 2/6/2017 Active    Melatonin 10 MG Tab CR > 3 days Active    vitamin D, Ergocalciferol, (DRISDOL) 11609 UNITS Cap capsule  Active                ALLERGIES  No Known Allergies    PHYSICAL EXAM  VITAL SIGNS: /77 mmHg  Pulse 134  Temp(Src) 37.7 °C (99.8 °F)  Resp 24  Ht 1.473 m (4' 10\")  Wt 88.451 kg (195 lb)  BMI 40.77 kg/m2  SpO2 94%  LMP 06/01/2017 (Exact Date)    Constitutional: Well developed, Well nourished, No acute distress, Able to answer questions.   HENT: Moist mucous membranes, Nose is normal in appearance without rhinorrhea, external ears are normal. Right sided tongue laceration.   Eyes: Anicteric, pupils are equal round and reactive, there is no conjunctival drainage or pallor   Neck: The trachea is midline, there is no obvious mass or meningeal signs  Cardiovascular:  Tachycardic rate and regular rhythm without murmurs gallops or rubs  Thorax & Lungs: Respiratory rate and effort are normal. There is normal chest excursion with respiration. No wheezes rhonchi or rales noted.  Abdomen: Abdomen is normal in appearance, non-tender.   Musculoskeletal: No deformities noted in all 4 extremities. Tenderness to the left lateral malleolus, no tendon tenderness.   Skin: Skin is warm, visualized skin shows " no erythema, no rash.  Neurologic: Cranial nerves II through XII are intact there is no focal abnormality noted.  Psychiatric: Normal mood and mentation.    DIFFERENTIAL DIAGNOSIS:  Breakthrough seizure, hypoglycemia, medical noncompliance, ankle fracture, ankle sprain.     RADIOLOGY/PROCEDURES  DX-ANKLE 3+ VIEWS LEFT   Final Result      No fracture or dislocation of LEFT ankle.      The radiologist's interpretations of all radiological studies have been reviewed by me.        LABORATORY:  Results for orders placed or performed during the hospital encounter of 06/06/17   ACCU-CHEK GLUCOSE   Result Value Ref Range    Glucose - Accu-Ck 90 65 - 99 mg/dL   All labs reviewed by me.     INTERVENTIONS:  Medications   lamotrigine (LAMICTAL) tablet 100 mg (100 mg Oral Given 6/6/17 2015)   levetiracetam (KEPPRA) tablet 500 mg (500 mg Oral Given 6/6/17 2005)     Response:No further seizures.    COURSE & MEDICAL DECISION MAKING    7:42 PM - Patient seen and examined at bedside. Ordered for an ankle XR and Accu-chek to evaluate. The plan of care was discussed with the patient and I answered all of her questions at this time. The patient understands and is agreeable with this plan of care.      10:31 PM Patient reevaluated at bedside. Discussed that she did not fracture her ankle. The patient will be prescribed her seizure medications and was instructed to not miss any doses of these. Discussed plan for discharge; I advised the patient to [facility] with any new or worsening symptoms. Patient was given the opportunity for questions. I addressed all questions or concerns at this time and they verbalize agreement to the plan of care.     Well-appearing patient presents with a breakthrough seizure after being off her antiepileptics 4 days. There is no hypoglycemia. She has a mild left ankle sprain or contusion. She later showed me a rash on her right posterior neck that was painful red and patchy. There is no evidence  of significant head trauma or risk factors for hyponatremia. Patient denies current alcohol and does not use drug.    PLAN:  New Prescriptions    ESCITALOPRAM (LEXAPRO) 10 MG TAB    Take 1 Tab by mouth every day.    HYDROCORTISONE 1 % CREAM    Apply 2 g to affected area(s) 4 times a day.    LAMOTRIGINE 200 MG TABLET SR 24 HR    Take 1 Cap by mouth every day.    LEVETIRACETAM (KEPPRA) 500 MG TAB    Take 1 Tab by mouth 2 times a day.     The patient is referred to a primary physician for blood pressure management, diabetic screening, and for all other preventative health concerns.   Seizure handout given.   Velcro air splint    Reanna Garvin M.D.  [address] [place] [address] [phone]    Schedule an appointment as soon as possible for a visit  As needed    CONDITION: The patient will be discharged in stable condition.     FINAL IMPRESSION  1. Breakthrough seizure (CMS-HCC)    2. Sprain of tibiofibular ligament of left ankle, initial encounter    3. Rash      IBryanna (Nilsa), am scribing for, and in the presence of, Sandro Morel M.D..    Electronically signed by: Bryanna Grey (Nilsa), 6/6/2017    Sandro HAYNES M.D. personally performed the services described in this documentation, as scribed by Bryanna Grey in my presence, and it is both accurate and complete.     The note accurately reflects work and decisions made by me.  Sandro Morel  6/7/2017  12:32 AM

## 2017-06-07 NOTE — DISCHARGE INSTRUCTIONS
Seizures     Return for back to back seizures or seizure longer than 10 minutes.  Return for ill appearance, fever or injury.  Followup with your doctor.  Do not drive for 3 months until cleared by neurology.  During a seizure ease the patient to the floor, move objects away from him to prevent injury, put nothing in his mouth. Do not allow yourself to run out of medications. Use hydrocortisone for rash. Take ibuprofen and Tylenol for ankle pain..     You had a seizure.  About 2% of the population will have a seizure problem during their life.  Sometimes the cause for the seizure is not known.  Seizures are most often associated with one of these problems:  Ø Epilepsy.  Ø Not taking your seizure medicine.  Ø Alcohol and drug abuse.  Ø Head injury, strokes, tumors, and brain surgery.  Ø High fever and infections.  Ø Low blood sugar.     Evaluating a new seizure disorder may require having a brain scan or an EEG (brain wave test). If you have been given a seizure medicine, it is very important that you take it as prescribed.  Not taking these medicines as directed is the most common cause of seizures. Blood tests are often used to be sure you are taking the proper dose.      Seizures cause many different symptoms, from convulsions to brief blackouts. Please do not ride a bike, drive a car, go swimming, climb in high or dangerous places such as ladders or roofs, or operate any dangerous equipment until you have your doctor's permission.  If you hold a 's license, state law may require that a report be made to the motor vehicles department.  You should wear an emergency medical identification bracelet with information about your seizures. If you have any warning that a seizure may occur, lie down in a safe place to protect yourself.     Teach your family and friends what to do if you have any further seizures. They should stay calm and try to keep you from falling on hard or sharp objects. It is best not to try to  restrain a seizing person or to force anything into their mouth. Do not try to open clenched jaws.  When the seizure is over, the person should be rolled on their side to help drain any vomit or secretions from the mouth.  After a seizure the person may be confused or drowsy for several minutes and they can be reassured. An ambulance should be called if the seizure lasted more than 5 minutes or if confusion remains for more than 30 minutes.  Call your caregiver or the emergency department for further instructions.     Do not drive until cleared by your caregiver or neurologist!     Document Released: 01/25/2006  Document Re-Released: 10/14/2008  [employer]® Patient Information ©2009 [employer].

## 2017-06-07 NOTE — ED NOTES
Pt bib EMS.   Chief Complaint   Patient presents with   • Seizure     Per EMS, pt was at [facility] and had a tonic clonic seizure. Pt's friends state it last approx 90 sec. Pt states she has hx of epilepsy and has a seizure about once a month. Pt states she takes medications but unsure of medication name.     Pt states last time she took her medication was last week. Pt states she ran out medication and has been unable to pay for a new prescription.  Pt AOx4 at this time. Pt changed into gown and placed on monitor.   Chart up and ready for ERP now.

## 2017-07-11 ENCOUNTER — HOSPITAL ENCOUNTER (EMERGENCY)
Facility: MEDICAL CENTER | Age: 22
End: 2017-07-11
Attending: EMERGENCY MEDICINE

## 2017-07-11 VITALS
BODY MASS INDEX: 42.16 KG/M2 | WEIGHT: 200.84 LBS | SYSTOLIC BLOOD PRESSURE: 125 MMHG | HEIGHT: 58 IN | TEMPERATURE: 98.4 F | DIASTOLIC BLOOD PRESSURE: 83 MMHG | OXYGEN SATURATION: 96 % | HEART RATE: 62 BPM | RESPIRATION RATE: 18 BRPM

## 2017-07-11 DIAGNOSIS — F41.8 SITUATIONAL ANXIETY: ICD-10-CM

## 2017-07-11 DIAGNOSIS — S01.512A TONGUE LACERATION, INITIAL ENCOUNTER: ICD-10-CM

## 2017-07-11 DIAGNOSIS — R56.9 SEIZURE (HCC): ICD-10-CM

## 2017-07-11 PROCEDURE — 99283 EMERGENCY DEPT VISIT LOW MDM: CPT

## 2017-07-11 RX ORDER — HYDROCODONE BITARTRATE AND ACETAMINOPHEN 5; 325 MG/1; MG/1
1-2 TABLET ORAL EVERY 6 HOURS PRN
Qty: 15 TAB | Refills: 0 | Status: SHIPPED | OUTPATIENT
Start: 2017-07-11 | End: 2018-07-10

## 2017-07-11 RX ORDER — AMOXICILLIN 500 MG/1
500 CAPSULE ORAL 3 TIMES DAILY
Qty: 30 CAP | Refills: 0 | Status: SHIPPED | OUTPATIENT
Start: 2017-07-11 | End: 2017-07-11

## 2017-07-11 RX ORDER — AMOXICILLIN 500 MG/1
500 CAPSULE ORAL 3 TIMES DAILY
Qty: 30 CAP | Refills: 0 | Status: SHIPPED | OUTPATIENT
Start: 2017-07-11 | End: 2018-07-10

## 2017-07-11 ASSESSMENT — PAIN SCALES - GENERAL: PAINLEVEL_OUTOF10: 4

## 2017-07-11 NOTE — ED NOTES
"Pt bib self with c/o tongue injury. Pt states \"I had 4 seizures yesterday and during one of them I bit my tongue really bad.\"    "

## 2017-07-11 NOTE — ED NOTES
Pt seen and evaluated by md, D/c pt home, rx and work note given . Pt aware of f/u instructions , aware to return for any changes or concerns. No further questions upon d/c home from ed

## 2017-07-11 NOTE — ED AVS SNAPSHOT
Home Care Instructions                                                                                                                Orin White   MRN: [id number]    Department:  [facility], Emergency Dept   Date of Visit:  7/11/2017            [facility], Emergency Dept    [address]    [place] [address]    Phone:  [phone]      You were seen by     Femi Vu M.D.      Your Diagnosis Was     Tongue laceration, initial encounter     S01.512A       Follow-up Information     1. Follow up with Your Physician. Schedule an appointment as soon as possible for a visit in 1 day.    Specialty:  Emergency Medicine    Contact information    Varies        Medication Information     Review all of your home medications and newly ordered medications with your primary doctor and/or pharmacist as soon as possible. Follow medication instructions as directed by your doctor and/or pharmacist.     Please keep your complete medication list with you and share with your physician. Update the information when medications are discontinued, doses are changed, or new medications (including over-the-counter products) are added; and carry medication information at all times in the event of emergency situations.               Medication List      START taking these medications        Instructions    Morning Afternoon Evening Bedtime    amoxicillin 500 MG Caps   Commonly known as:  AMOXIL        Take 1 Cap by mouth 3 times a day.   Dose:  500 mg                        hydrocodone-acetaminophen 5-325 MG Tabs per tablet   Commonly known as:  NORCO        Take 1-2 Tabs by mouth every 6 hours as needed.   Dose:  1-2 Tab                          ASK your doctor about these medications        Instructions    Morning Afternoon Evening Bedtime    * escitalopram 10 MG Tabs   Commonly known as:  LEXAPRO        Take 10 mg by mouth every day.   Dose:  10 mg                        *  escitalopram 10 MG Tabs   Commonly known as:  LEXAPRO        Take 1 Tab by mouth every day.   Dose:  10 mg                        gabapentin 300 MG Caps   Commonly known as:  NEURONTIN        Take 1 Cap by mouth every evening.   Dose:  300 mg                        hydrocortisone 1 % Crea        Apply 2 g to affected area(s) 4 times a day.   Dose:  2 g                        ibuprofen 200 MG Tabs   Commonly known as:  MOTRIN        Take 400 mg by mouth every 6 hours as needed for Mild Pain.   Dose:  400 mg                        * LamoTRIgine 200 MG Tb24        Take 400 mg by mouth every day.   Dose:  400 mg                        * LamoTRIgine 200 MG Tb24        Take 1 Cap by mouth every day.   Dose:  1 Cap                        levetiracetam 500 MG Tabs   Commonly known as:  KEPPRA        Take 1 Tab by mouth 2 times a day.   Dose:  500 mg                        Melatonin 10 MG Tbcr        Take 10 mg PE by mouth every bedtime.   Dose:  10 mg PE                        vitamin D (Ergocalciferol) 88079 UNITS Caps capsule   Commonly known as:  DRISDOL        Take 1 Cap by mouth every 7 days.   Dose:  27572 Units                        * Notice:  This list has 4 medication(s) that are the same as other medications prescribed for you. Read the directions carefully, and ask your doctor or other care provider to review them with you.         Where to Get Your Medications      You can get these medications from any pharmacy     Bring a paper prescription for each of these medications    - amoxicillin 500 MG Caps  - hydrocodone-acetaminophen 5-325 MG Tabs per tablet              Discharge Instructions       Watch for signs of infection.  Antibiotics as prescribed.  Follow up with her neurologist.  Increase her Keppra from 1 tablet twice a day to one half tablets twice a day.  Call your neurologist tomorrow for an appointment.      Tongue Laceration  A tongue laceration is a cut on the tongue. Over the next 1 to 2 days, you  will see that the wound edges appear gray in color. The edges may appear ragged and slightly spread apart. Because of all the normal bacteria in the mouth, these wounds are contaminated, but this is not an infection that needs antibiotics. Most wounds heal with no problems despite their appearance.  TREATMENT   Most tongue lacerations only go partway through the tongue. This type of injury generally does not need stitches (sutures). More serious injuries penetrate the tongue deeper and may need sutures and follow-up care.  HOME CARE INSTRUCTIONS   · Cover an ice cube in a thin cloth and hold it directly on the cut for 1 to 3 minutes at a time, 6 to 10 times per day. Do this for 1 day. This can help reduce pain and swelling.  · After the first day, rinse the mouth with a warm, saltwater wash 4 to 6 times  per day, or as your caregiver instructs.  · If there are no injuries to your teeth, continue oral hygiene and gentle brushing. Do not brush loose or broken teeth or teeth that have been put back into normal position by your caregiver.  · Large or complex cuts may require antibiotics to prevent infection. Take your antibiotics as directed. Finish them even if you start to feel better.  · Do not eat or drink hot food or beverages while your mouth is still numb.  · Do not eat hard foods (such as apples) or chewy foods (such as broiled meat) until your caregiver advises you otherwise.  · If your caregiver used sutures to repair the cut, do not pull or chew them. If you do this, they will gradually loosen and may become untied.  · Only take over-the-counter or prescription medicines for pain, discomfort, or fever as directed by your caregiver.  You may need a tetanus shot if:  · You cannot remember when you had your last tetanus shot.  · You have never had a tetanus shot.  If you get a tetanus shot, your arm may swell, get red, and feel warm to the touch. This is common and not a problem. If you need a tetanus shot and  you choose not to have one, there is a rare chance of getting tetanus. Sickness from tetanus can be serious.  SEEK IMMEDIATE MEDICAL CARE IF:   · You develop swelling or increasing pain in the wound or in other parts of your mouth or face.  · You see pus coming from the wound. Some drainage in the mouth is normal.  · You have a fever.  · You notice the edges of the wound break open after sutures have been removed.  · You develop bleeding that does not stop when you apply pressure.  · You develop any breathing problems.  MAKE SURE YOU:  · Understand these instructions.  · Will watch your condition.  · Will get help right away if you are not doing well or get worse.     This information is not intended to replace advice given to you by your health care provider. Make sure you discuss any questions you have with your health care provider.     Document Released: 12/18/2007 Document Revised: 01/08/2016 Document Reviewed: 06/21/2012  "[employer] Interactive Patient Education ©2016 "[employer] Inc.            Patient Information     Patient Information    Following emergency treatment: all patient requiring follow-up care must return either to a private physician or a clinic if your condition worsens before you are able to obtain further medical attention, please return to the emergency room.     Billing Information    At [facility], we work to make the billing process streamlined for our patients.  Our Representatives are here to answer any questions you may have regarding your hospital bill.  If you have insurance coverage and have supplied your insurance information to us, we will submit a claim to your insurer on your behalf.  Should you have any questions regarding your bill, we can be reached online or by phone as follows:  Online: You are able pay your bills online or live chat with our representatives about any billing questions you may have. We are here to help Monday - Friday from 8:00am to 7:30pm and 9:00am -  12:00pm on Saturdays.  Please visit [email or web address]  for more information.   Phone:  [phone] or [phone]    Please note that your emergency physician, surgeon, pathologist, radiologist, anesthesiologist, and other specialists are not employed by [facility] and will therefore bill separately for their services.  Please contact them directly for any questions concerning their bills at the numbers below:     Emergency Physician Services:  [phone]  [facility]:  [phone]  [facility]:  [phone]  [facility]:  [phone]    1. Your final bill may vary from the amount quoted upon discharge if all procedures are not complete at that time, or if your doctor has additional procedures of which we are not aware. You will receive an additional bill if you return to the Emergency Department at [facility] for suture removal regardless of the facility of which the sutures were placed.     2. Please arrange for settlement of this account at the emergency registration.    3. All self-pay accounts are due in full at the time of treatment.  If you are unable to meet this obligation then payment is expected within 4-5 days.     4. If you have had radiology studies (CT, X-ray, Ultrasound, MRI), you have received a preliminary result during your emergency department visit. Please contact the radiology department [phone] to receive a copy of your final result. Please discuss the Final result with your primary physician or with the follow up physician provided.     Crisis Hotline:  [place] Crisis Hotline:  3-316-HMUTCXT or 1-610.324.8864  [place] Crisis Hotline:    1-266.957.2562 or 791-046-0192         ED Discharge Follow Up Questions    1. In order to provide you with very good care, we would like to follow up with a phone call in the next few days.  May we have your permission to contact you?     YES /  NO    2. What is  the best phone number to call you? (       )_____-__________    3. What is the best time to call you?      Morning  /  Afternoon  /  Evening                   Patient Signature:  ____________________________________________________________    Date:  ____________________________________________________________

## 2017-07-11 NOTE — ED PROVIDER NOTES
ED Provider Note    CHIEF COMPLAINT  Chief Complaint   Patient presents with   • Mouth Pain       HPI  Orin White is a 21 y.o. female who presents to the ER complaining of mouth pain which bit her tongue.  The patient states she is long history of a seizure disorder and has been taking her medications, but despite that had 4 seizures in the course of 24 hours about 3 days ago.  No seizure activity since that time.  She bit her tongue is of some tongue pain.  She denies any fevers or chills.  Denies any other injuries.  Denies any sore throat, runny nose cough.  There is a little bit fatigued.  Usually has seizures about once a month.  Rarely has multiple seizures in a row.  Last medication change has been some time ago.  No other acute concerns or complaints    REVIEW OF SYSTEMS  See HPI for further details. All other systems are negative.    PAST MEDICAL HISTORY  Past Medical History   Diagnosis Date   • Seizure (CMS-HCC)      since October   • Epilepsy (CMS-HCC)    • Depression        FAMILY HISTORY  Family History   Problem Relation Age of Onset   • Diabetes Maternal Grandmother        SOCIAL HISTORY  Social History     Social History   • Marital Status: Single     Spouse Name: N/A   • Number of Children: N/A   • Years of Education: N/A     Social History Main Topics   • Smoking status: Current Some Day Smoker     Types: Cigarettes   • Smokeless tobacco: Never Used   • Alcohol Use: 0.0 oz/week     0 Standard drinks or equivalent per week      Comment: occ.   • Drug Use: No   • Sexual Activity: Not Currently     Other Topics Concern   • None     Social History Narrative    ** Merged History Encounter **         [employer] -senior    Lives with Mom, sisters, brother               SURGICAL HISTORY  History reviewed. No pertinent past surgical history.    CURRENT MEDICATIONS  Home Medications     **Home medications have not yet been reviewed for this encounter**          ALLERGIES  No Known Allergies    PHYSICAL  "EXAM  VITAL SIGNS: /83 mmHg  Pulse 62  Temp(Src) 36.9 °C (98.4 °F)  Resp 18  Ht 1.473 m (4' 9.99\")  Wt 91.1 kg (200 lb 13.4 oz)  BMI 41.99 kg/m2  SpO2 96%   Constitutional: Well developed, Well nourished, No acute distress, Non-toxic appearance.   HENT: Normocephalic, Atraumatic, Bilateral external ears normal, Oropharynx moist, No oral exudates, Nose normal.   Eyes: PERRL, EOMI, Conjunctiva normal, No discharge.   Neck: Normal range of motion, No tenderness, Supple, No stridor.   Cardiovascular: Normal heart rate, Normal rhythm, No murmurs, No rubs, No gallops.   Thorax & Lungs: Normal breath sounds, No respiratory distress, No wheezin  Abdomen: Bowel sounds normal, Soft, No tenderness,  Skin: Warm, Dry, No erythema, No rash.   Back: No tenderness, No CVA tenderness.   Musculoskeletal: Good range of motion in all major joints.   Neurologic: Alert & oriented x 3, No focal deficits noted.   Psychiatric: Affect normal        COURSE & MEDICAL DECISION MAKING  Pertinent Labs & Imaging studies reviewed. (See chart for details)  Patient presents with seizures.  She has long history of seizures, currently stable on her medications for some time.  She denies missing any doses or having any conversations.    For this, I spoke with Dr. Kline who is on call for her neurologist recommends increasing her Keppra from 500 mg twice a day to 750 mg twice a day.  I will advise the patient.  She is counseled to follow up with her neurologist.      For her Tongue laceration throughout some oral antibiotics.  Advised to gargle with mouthwash couple times a day.  She is requesting a short for pain medication.  She is prescribed Norco.    Prior to the prescription of [place] prescription monitoring history has been reviewed.  Although her tongue is painful and swollen there is not evidence of significant infection.  She does not have a significant infection that would require IV antibiotic to admission.  She looks well.  She is " discharged in good condition.    FINAL IMPRESSION  1. Tongue laceration, initial encounter    2. Seizure (CMS-HCC)    3. Situational anxiety        2.   3.         Electronically signed by: Femi Vu, 7/11/2017 4:04 PM

## 2017-07-11 NOTE — DISCHARGE INSTRUCTIONS
Watch for signs of infection.  Antibiotics as prescribed.  Follow up with her neurologist.  Increase her Keppra from 1 tablet twice a day to one half tablets twice a day.  Call your neurologist tomorrow for an appointment.      Tongue Laceration  A tongue laceration is a cut on the tongue. Over the next 1 to 2 days, you will see that the wound edges appear gray in color. The edges may appear ragged and slightly spread apart. Because of all the normal bacteria in the mouth, these wounds are contaminated, but this is not an infection that needs antibiotics. Most wounds heal with no problems despite their appearance.  TREATMENT   Most tongue lacerations only go partway through the tongue. This type of injury generally does not need stitches (sutures). More serious injuries penetrate the tongue deeper and may need sutures and follow-up care.  HOME CARE INSTRUCTIONS   · Cover an ice cube in a thin cloth and hold it directly on the cut for 1 to 3 minutes at a time, 6 to 10 times per day. Do this for 1 day. This can help reduce pain and swelling.  · After the first day, rinse the mouth with a warm, saltwater wash 4 to 6 times  per day, or as your caregiver instructs.  · If there are no injuries to your teeth, continue oral hygiene and gentle brushing. Do not brush loose or broken teeth or teeth that have been put back into normal position by your caregiver.  · Large or complex cuts may require antibiotics to prevent infection. Take your antibiotics as directed. Finish them even if you start to feel better.  · Do not eat or drink hot food or beverages while your mouth is still numb.  · Do not eat hard foods (such as apples) or chewy foods (such as broiled meat) until your caregiver advises you otherwise.  · If your caregiver used sutures to repair the cut, do not pull or chew them. If you do this, they will gradually loosen and may become untied.  · Only take over-the-counter or prescription medicines for pain, discomfort,  or fever as directed by your caregiver.  You may need a tetanus shot if:  · You cannot remember when you had your last tetanus shot.  · You have never had a tetanus shot.  If you get a tetanus shot, your arm may swell, get red, and feel warm to the touch. This is common and not a problem. If you need a tetanus shot and you choose not to have one, there is a rare chance of getting tetanus. Sickness from tetanus can be serious.  SEEK IMMEDIATE MEDICAL CARE IF:   · You develop swelling or increasing pain in the wound or in other parts of your mouth or face.  · You see pus coming from the wound. Some drainage in the mouth is normal.  · You have a fever.  · You notice the edges of the wound break open after sutures have been removed.  · You develop bleeding that does not stop when you apply pressure.  · You develop any breathing problems.  MAKE SURE YOU:  · Understand these instructions.  · Will watch your condition.  · Will get help right away if you are not doing well or get worse.     This information is not intended to replace advice given to you by your health care provider. Make sure you discuss any questions you have with your health care provider.     Document Released: 12/18/2007 Document Revised: 01/08/2016 Document Reviewed: 06/21/2012  [employer] Interactive Patient Education ©2016 [employer]

## 2017-07-11 NOTE — ED AVS SNAPSHOT
7/11/2017    Orin White  [address]  [place] [address]    Dear Orin:    [facility] wants to ensure your discharge home is safe and you or your loved ones have had all of your questions answered regarding your care after you leave the hospital.    Below is a list of resources and contact information should you have any questions regarding your hospital stay, follow-up instructions, or active medical symptoms.    Questions or Concerns Regarding… Contact   Medical Questions Related to Your Discharge  (7 days a week, 8am-5pm) Contact a Nurse Care Coordinator   [phone]   Medical Questions Not Related to Your Discharge  (24 hours a day / 7 days a week)  Contact the Nurse Health Line   [phone]    Medications or Discharge Instructions Refer to your discharge packet   or contact your [facility] Primary Care Provider   [phone]   Follow-up Appointment(s) Schedule your appointment via [employer]   or contact Scheduling [phone]   Billing Review your statement via [employer]  or contact Billing [phone]   Medical Records Review your records via [employer]   or contact Medical Records [phone]     You may receive a telephone call within two days of discharge. This call is to make certain you understand your discharge instructions and have the opportunity to have any questions answered. You can also easily access your medical information, test results and upcoming appointments via the [employer] free online health management tool. You can learn more and sign up at [employer]. For assistance setting up your [employer] account, please call [phone].    Once again, we want to ensure your discharge home is safe and that you have a clear understanding of any next steps in your care. If you have any questions or concerns, please do not hesitate to contact us, we are here for you. Thank you for choosing [facility] for your healthcare needs.    Sincerely,    Your [facility] Healthcare Team

## 2017-07-11 NOTE — ED AVS SNAPSHOT
AIS Access Code: Activation code not generated  Current AIS Status: Active    [employer]  A secure, online tool to manage your health information     [employer]’s AIS® is a secure, online tool that connects you to your personalized health information from the privacy of your home -- day or night - making it very easy for you to manage your healthcare. Once the activation process is completed, you can even access your medical information using the AIS janina, which is available for free in the [employer] Janina store or Google Play store.     AIS provides the following levels of access (as shown below):   My Chart Features   [facility] Primary Care Doctor [facility]  Specialists [facility]  Urgent  Care Non-[facility]  Primary Care  Doctor   Email your healthcare team securely and privately 24/7 X X X X   Manage appointments: schedule your next appointment; view details of past/upcoming appointments X      Request prescription refills. X      View recent personal medical records, including lab and immunizations X X X X   View health record, including health history, allergies, medications X X X X   Read reports about your outpatient visits, procedures, consult and ER notes X X X X   See your discharge summary, which is a recap of your hospital and/or ER visit that includes your diagnosis, lab results, and care plan. X X       How to register for AIS:  1. Go to  [email or web address].  2. Click on the Sign Up Now box, which takes you to the New Member Sign Up page. You will need to provide the following information:  a. Enter your AIS Access Code exactly as it appears at the top of this page. (You will not need to use this code after you’ve completed the sign-up process. If you do not sign up before the expiration date, you must request a new code.)   b. Enter your date of birth.   c. Enter your home email address.   d. Click Submit, and follow the next screen’s instructions.  3. Create a AIS ID. This will  be your [employer] login ID and cannot be changed, so think of one that is secure and easy to remember.  4. Create a [employer] password. You can change your password at any time.  5. Enter your Password Reset Question and Answer. This can be used at a later time if you forget your password.   6. Enter your e-mail address. This allows you to receive e-mail notifications when new information is available in [employer].  7. Click Sign Up. You can now view your health information.    For assistance activating your [employer] account, call [phone]

## 2017-07-22 ENCOUNTER — HOSPITAL ENCOUNTER (EMERGENCY)
Facility: MEDICAL CENTER | Age: 22
End: 2017-07-22
Attending: EMERGENCY MEDICINE

## 2017-07-22 VITALS
SYSTOLIC BLOOD PRESSURE: 117 MMHG | RESPIRATION RATE: 16 BRPM | DIASTOLIC BLOOD PRESSURE: 98 MMHG | HEART RATE: 102 BPM | TEMPERATURE: 98.3 F | BODY MASS INDEX: 39.82 KG/M2 | OXYGEN SATURATION: 97 % | WEIGHT: 197.53 LBS | HEIGHT: 59 IN

## 2017-07-22 DIAGNOSIS — R56.9 SEIZURE (HCC): ICD-10-CM

## 2017-07-22 PROCEDURE — 99282 EMERGENCY DEPT VISIT SF MDM: CPT

## 2017-07-22 ASSESSMENT — PAIN SCALES - GENERAL: PAINLEVEL_OUTOF10: 6

## 2017-07-22 NOTE — ED AVS SNAPSHOT
Gateway 3D Access Code: Activation code not generated  Current Gateway 3D Status: Active    Thubrikar Aortic Valvehart  A secure, online tool to manage your health information     [employer]’s Gateway 3D® is a secure, online tool that connects you to your personalized health information from the privacy of your home -- day or night - making it very easy for you to manage your healthcare. Once the activation process is completed, you can even access your medical information using the Gateway 3D janina, which is available for free in the Apple Janina store or Google Play store.     Gateway 3D provides the following levels of access (as shown below):   My Chart Features   [facility] Primary Care Doctor [facility]  Specialists [facility]  Urgent  Care Non-[facility]  Primary Care  Doctor   Email your healthcare team securely and privately 24/7 X X X X   Manage appointments: schedule your next appointment; view details of past/upcoming appointments X      Request prescription refills. X      View recent personal medical records, including lab and immunizations X X X X   View health record, including health history, allergies, medications X X X X   Read reports about your outpatient visits, procedures, consult and ER notes X X X X   See your discharge summary, which is a recap of your hospital and/or ER visit that includes your diagnosis, lab results, and care plan. X X       How to register for Gateway 3D:  1. Go to  [email or web address].  2. Click on the Sign Up Now box, which takes you to the New Member Sign Up page. You will need to provide the following information:  a. Enter your Gateway 3D Access Code exactly as it appears at the top of this page. (You will not need to use this code after you’ve completed the sign-up process. If you do not sign up before the expiration date, you must request a new code.)   b. Enter your date of birth.   c. Enter your home email address.   d. Click Submit, and follow the next screen’s instructions.  3. Create a Gateway 3D ID. This will  be your [employer] login ID and cannot be changed, so think of one that is secure and easy to remember.  4. Create a [employer] password. You can change your password at any time.  5. Enter your Password Reset Question and Answer. This can be used at a later time if you forget your password.   6. Enter your e-mail address. This allows you to receive e-mail notifications when new information is available in [employer].  7. Click Sign Up. You can now view your health information.    For assistance activating your [employer] account, call [phone]

## 2017-07-22 NOTE — ED AVS SNAPSHOT
7/22/2017    Orin White  [address]  [place] [address]    Dear Orin:    [facility] wants to ensure your discharge home is safe and you or your loved ones have had all of your questions answered regarding your care after you leave the hospital.    Below is a list of resources and contact information should you have any questions regarding your hospital stay, follow-up instructions, or active medical symptoms.    Questions or Concerns Regarding… Contact   Medical Questions Related to Your Discharge  (7 days a week, 8am-5pm) Contact a Nurse Care Coordinator   [phone]   Medical Questions Not Related to Your Discharge  (24 hours a day / 7 days a week)  Contact the Nurse Health Line   [phone]    Medications or Discharge Instructions Refer to your discharge packet   or contact your [facility] Primary Care Provider   [phone]   Follow-up Appointment(s) Schedule your appointment via [employer]   or contact Scheduling [phone]   Billing Review your statement via [employer]  or contact Billing [phone]   Medical Records Review your records via Ter[employer]con   or contact Medical Records [phone]     You may receive a telephone call within two days of discharge. This call is to make certain you understand your discharge instructions and have the opportunity to have any questions answered. You can also easily access your medical information, test results and upcoming appointments via the Terarecon free online health management tool. You can learn more and sign up at [facility]/Terarecon. For assistance setting up your Terarecon account, please call [phone].    Once again, we want to ensure your discharge home is safe and that you have a clear understanding of any next steps in your care. If you have any questions or concerns, please do not hesitate to contact us, we are here for you. Thank you for choosing [facility] for your healthcare needs.    Sincerely,    Your [facility] Healthcare Team

## 2017-07-22 NOTE — ED AVS SNAPSHOT
Home Care Instructions                                                                                                                Orin White   MRN: [id number]    Department:  [facility], Emergency Dept   Date of Visit:  7/22/2017            [facility], Emergency Dept    [address]    [place] [address]    Phone:  [phone]      You were seen by     Austin Ballesteros M.D.      Your Diagnosis Was     Seizure (CMS-HCC)     R56.9       Follow-up Information     1. Follow up with Reanna Garvin M.D. In 1 day.    Specialty:  Family Medicine    Contact information    [address]  [address]  [place] [address] [phone]        Medication Information     Review all of your home medications and newly ordered medications with your primary doctor and/or pharmacist as soon as possible. Follow medication instructions as directed by your doctor and/or pharmacist.     Please keep your complete medication list with you and share with your physician. Update the information when medications are discontinued, doses are changed, or new medications (including over-the-counter products) are added; and carry medication information at all times in the event of emergency situations.               Medication List      ASK your doctor about these medications        Instructions    Morning Afternoon Evening Bedtime    amoxicillin 500 MG Caps   Commonly known as:  AMOXIL        Take 1 Cap by mouth 3 times a day.   Dose:  500 mg                        * escitalopram 10 MG Tabs   Commonly known as:  LEXAPRO        Take 10 mg by mouth every day.   Dose:  10 mg                        * escitalopram 10 MG Tabs   Commonly known as:  LEXAPRO        Take 1 Tab by mouth every day.   Dose:  10 mg                        gabapentin 300 MG Caps   Commonly known as:  NEURONTIN        Take 1 Cap by mouth every evening.   Dose:  300 mg                        hydrocodone-acetaminophen 5-325 MG  Tabs per tablet   Commonly known as:  NORCO        Take 1-2 Tabs by mouth every 6 hours as needed.   Dose:  1-2 Tab                        hydrocortisone 1 % Crea        Apply 2 g to affected area(s) 4 times a day.   Dose:  2 g                        ibuprofen 200 MG Tabs   Commonly known as:  MOTRIN        Take 400 mg by mouth every 6 hours as needed for Mild Pain.   Dose:  400 mg                        * LamoTRIgine 200 MG Tb24        Take 400 mg by mouth every day.   Dose:  400 mg                        * LamoTRIgine 200 MG Tb24        Take 1 Cap by mouth every day.   Dose:  1 Cap                        levetiracetam 500 MG Tabs   Commonly known as:  KEPPRA        Take 1 Tab by mouth 2 times a day.   Dose:  500 mg                        Melatonin 10 MG Tbcr        Take 10 mg PE by mouth every bedtime.   Dose:  10 mg PE                        vitamin D (Ergocalciferol) 61104 UNITS Caps capsule   Commonly known as:  DRISDOL        Take 1 Cap by mouth every 7 days.   Dose:  63809 Units                        * Notice:  This list has 4 medication(s) that are the same as other medications prescribed for you. Read the directions carefully, and ask your doctor or other care provider to review them with you.              Discharge Instructions       Epilepsy  Epilepsy is a disorder in which a person has repeated seizures over time. A seizure is a release of abnormal electrical activity in the brain. Seizures can cause a change in attention, behavior, or the ability to remain awake and alert (altered mental status). Seizures often involve uncontrollable shaking (convulsions).   Most people with epilepsy lead normal lives. However, people with epilepsy are at an increased risk of falls, accidents, and injuries. Therefore, it is important to begin treatment right away.  CAUSES   Epilepsy has many possible causes. Anything that disturbs the normal pattern of brain cell activity can lead to seizures. This may include:    · Head injury.  · Birth trauma.  · High fever as a child.  · Stroke.  · Bleeding into or around the brain.  · Certain drugs.  · Prolonged low oxygen, such as what occurs after CPR efforts.  · Abnormal brain development.  · Certain illnesses, such as meningitis, encephalitis (brain infection), malaria, and other infections.  · An imbalance of nerve signaling chemicals (neurotransmitters).    SIGNS AND SYMPTOMS   The symptoms of a seizure can vary greatly from one person to another. Right before a seizure, you may have a warning (aura) that a seizure is about to occur. An aura may include the following symptoms:  · Fear or anxiety.  · Nausea.  · Feeling like the room is spinning (vertigo).  · Vision changes, such as seeing flashing lights or spots.  Common symptoms during a seizure include:  · Abnormal sensations, such as an abnormal smell or a bitter taste in the mouth.    · Sudden, general body stiffness.    · Convulsions that involve rhythmic jerking of the face, arm, or leg on one or both sides.    · Sudden change in consciousness.    ¨ Appearing to be awake but not responding.    ¨ Appearing to be asleep but cannot be awakened.    · Grimacing, chewing, lip smacking, drooling, tongue biting, or loss of bowel or bladder control.  After a seizure, you may feel sleepy for a while.   DIAGNOSIS   Your health care provider will ask about your symptoms and take a medical history. Descriptions from any witnesses to your seizures will be very helpful in the diagnosis. A physical exam, including a detailed neurological exam, is necessary. Various tests may be done, such as:   · An electroencephalogram (EEG). This is a painless test of your brain waves. In this test, a diagram is created of your brain waves. These diagrams can be interpreted by a specialist.  · An MRI of the brain.    · A CT scan of the brain.    · A spinal tap (lumbar puncture, LP).  · Blood tests to check for signs of infection or abnormal blood  chemistry.  TREATMENT   There is no cure for epilepsy, but it is generally treatable. Once epilepsy is diagnosed, it is important to begin treatment as soon as possible. For most people with epilepsy, seizures can be controlled with medicines. The following may also be used:  · A pacemaker for the brain (vagus nerve stimulator) can be used for people with seizures that are not well controlled by medicine.  · Surgery on the brain.  For some people, epilepsy eventually goes away.  HOME CARE INSTRUCTIONS   · Follow your health care provider's recommendations on driving and safety in normal activities.  · Get enough rest. Lack of sleep can cause seizures.  · Only take over-the-counter or prescription medicines as directed by your health care provider. Take any prescribed medicine exactly as directed.  · Avoid any known triggers of your seizures.  · Keep a seizure diary. Record what you recall about any seizure, especially any possible trigger.    · Make sure the people you live and work with know that you are prone to seizures. They should receive instructions on how to help you. In general, a witness to a seizure should:    ¨ Cushion your head and body.    ¨ Turn you on your side.    ¨ Avoid unnecessarily restraining you.    ¨ Not place anything inside your mouth.    ¨ Call for emergency medical help if there is any question about what has occurred.    · Follow up with your health care provider as directed. You may need regular blood tests to monitor the levels of your medicine.    SEEK MEDICAL CARE IF:   · You develop signs of infection or other illness. This might increase the risk of a seizure.    · You seem to be having more frequent seizures.    · Your seizure pattern is changing.    SEEK IMMEDIATE MEDICAL CARE IF:   · You have a seizure that does not stop after a few moments.    · You have a seizure that causes any difficulty in breathing.    · You have a seizure that results in a very severe headache.    · You  have a seizure that leaves you with the inability to speak or use a part of your body.       This information is not intended to replace advice given to you by your health care provider. Make sure you discuss any questions you have with your health care provider.     Document Released: 12/18/2006 Document Revised: 10/08/2014 Document Reviewed: 07/30/2014  [employer] Interactive Patient Education ©2016 [employer]            Patient Information     Patient Information    Following emergency treatment: all patient requiring follow-up care must return either to a private physician or a clinic if your condition worsens before you are able to obtain further medical attention, please return to the emergency room.     Billing Information    At [facility], we work to make the billing process streamlined for our patients.  Our Representatives are here to answer any questions you may have regarding your hospital bill.  If you have insurance coverage and have supplied your insurance information to us, we will submit a claim to your insurer on your behalf.  Should you have any questions regarding your bill, we can be reached online or by phone as follows:  Online: You are able pay your bills online or live chat with our representatives about any billing questions you may have. We are here to help Monday - Friday from 8:00am to 7:30pm and 9:00am - 12:00pm on Saturdays.  Please visit [email or web address]  for more information.   Phone:  [phone] or [phone]    Please note that your emergency physician, surgeon, pathologist, radiologist, anesthesiologist, and other specialists are not employed by [facility] and will therefore bill separately for their services.  Please contact them directly for any questions concerning their bills at the numbers below:     Emer[facility] Physician Services:  [phone]  [facility]:  [phone]  [facility]:   [phone]  [facility]:  [phone]    1. Your final bill may vary from the amount quoted upon discharge if all procedures are not complete at that time, or if your doctor has additional procedures of which we are not aware. You will receive an additional bill if you return to the Emergency Department at [facility] for suture removal regardless of the facility of which the sutures were placed.     2. Please arrange for settlement of this account at the emergency registration.    3. All self-pay accounts are due in full at the time of treatment.  If you are unable to meet this obligation then payment is expected within 4-5 days.     4. If you have had radiology studies (CT, X-ray, Ultrasound, MRI), you have received a preliminary result during your emergency department visit. Please contact the radiology department [phone] to receive a copy of your final result. Please discuss the Final result with your primary physician or with the follow up physician provided.     Crisis Hotline:  [place] Crisis Hotline:  6-369-SAMUJCK or 1-233.400.4443  [place] Crisis Hotline:    1-747.151.8938 or 130-514-0358         ED Discharge Follow Up Questions    1. In order to provide you with very good care, we would like to follow up with a phone call in the next few days.  May we have your permission to contact you?     YES /  NO    2. What is the best phone number to call you? (       )_____-__________    3. What is the best time to call you?      Morning  /  Afternoon  /  Evening                   Patient Signature:  ____________________________________________________________    Date:  ____________________________________________________________

## 2017-07-23 NOTE — ED NOTES
Received report from MOE Ball.  Pt updated on POC, call light in place, gurney in low/locked position.

## 2017-07-23 NOTE — ED NOTES
"Pt ambulatory to 22, states that she used to have seizures only once a month but that she had 4 last week. States that her neurologist is aware and that he increased her Keppra dose. She is compliant with her medications. States that this am she knows that she had a seizure because she bit her tongue and wet her pants. Is here now because she states \"my boss needs a note for me to return to work\"   "

## 2017-07-23 NOTE — DISCHARGE INSTRUCTIONS
Epilepsy  Epilepsy is a disorder in which a person has repeated seizures over time. A seizure is a release of abnormal electrical activity in the brain. Seizures can cause a change in attention, behavior, or the ability to remain awake and alert (altered mental status). Seizures often involve uncontrollable shaking (convulsions).   Most people with epilepsy lead normal lives. However, people with epilepsy are at an increased risk of falls, accidents, and injuries. Therefore, it is important to begin treatment right away.  CAUSES   Epilepsy has many possible causes. Anything that disturbs the normal pattern of brain cell activity can lead to seizures. This may include:   · Head injury.  · Birth trauma.  · High fever as a child.  · Stroke.  · Bleeding into or around the brain.  · Certain drugs.  · Prolonged low oxygen, such as what occurs after CPR efforts.  · Abnormal brain development.  · Certain illnesses, such as meningitis, encephalitis (brain infection), malaria, and other infections.  · An imbalance of nerve signaling chemicals (neurotransmitters).    SIGNS AND SYMPTOMS   The symptoms of a seizure can vary greatly from one person to another. Right before a seizure, you may have a warning (aura) that a seizure is about to occur. An aura may include the following symptoms:  · Fear or anxiety.  · Nausea.  · Feeling like the room is spinning (vertigo).  · Vision changes, such as seeing flashing lights or spots.  Common symptoms during a seizure include:  · Abnormal sensations, such as an abnormal smell or a bitter taste in the mouth.    · Sudden, general body stiffness.    · Convulsions that involve rhythmic jerking of the face, arm, or leg on one or both sides.    · Sudden change in consciousness.    ¨ Appearing to be awake but not responding.    ¨ Appearing to be asleep but cannot be awakened.    · Grimacing, chewing, lip smacking, drooling, tongue biting, or loss of bowel or bladder control.  After a seizure,  you may feel sleepy for a while.   DIAGNOSIS   Your health care provider will ask about your symptoms and take a medical history. Descriptions from any witnesses to your seizures will be very helpful in the diagnosis. A physical exam, including a detailed neurological exam, is necessary. Various tests may be done, such as:   · An electroencephalogram (EEG). This is a painless test of your brain waves. In this test, a diagram is created of your brain waves. These diagrams can be interpreted by a specialist.  · An MRI of the brain.    · A CT scan of the brain.    · A spinal tap (lumbar puncture, LP).  · Blood tests to check for signs of infection or abnormal blood chemistry.  TREATMENT   There is no cure for epilepsy, but it is generally treatable. Once epilepsy is diagnosed, it is important to begin treatment as soon as possible. For most people with epilepsy, seizures can be controlled with medicines. The following may also be used:  · A pacemaker for the brain (vagus nerve stimulator) can be used for people with seizures that are not well controlled by medicine.  · Surgery on the brain.  For some people, epilepsy eventually goes away.  HOME CARE INSTRUCTIONS   · Follow your health care provider's recommendations on driving and safety in normal activities.  · Get enough rest. Lack of sleep can cause seizures.  · Only take over-the-counter or prescription medicines as directed by your health care provider. Take any prescribed medicine exactly as directed.  · Avoid any known triggers of your seizures.  · Keep a seizure diary. Record what you recall about any seizure, especially any possible trigger.    · Make sure the people you live and work with know that you are prone to seizures. They should receive instructions on how to help you. In general, a witness to a seizure should:    ¨ Cushion your head and body.    ¨ Turn you on your side.    ¨ Avoid unnecessarily restraining you.    ¨ Not place anything inside your  mouth.    ¨ Call for emergency medical help if there is any question about what has occurred.    · Follow up with your health care provider as directed. You may need regular blood tests to monitor the levels of your medicine.    SEEK MEDICAL CARE IF:   · You develop signs of infection or other illness. This might increase the risk of a seizure.    · You seem to be having more frequent seizures.    · Your seizure pattern is changing.    SEEK IMMEDIATE MEDICAL CARE IF:   · You have a seizure that does not stop after a few moments.    · You have a seizure that causes any difficulty in breathing.    · You have a seizure that results in a very severe headache.    · You have a seizure that leaves you with the inability to speak or use a part of your body.       This information is not intended to replace advice given to you by your health care provider. Make sure you discuss any questions you have with your health care provider.     Document Released: 12/18/2006 Document Revised: 10/08/2014 Document Reviewed: 07/30/2014  [employer] Interactive Patient Education ©2016 [employer].

## 2017-07-23 NOTE — ED NOTES
Patient was educated on discharge instructions.  Patient was informed about diagnosis, symptom management, risks, and home care instructions.  Patient verbalized understanding and signed discharge instructions. Copy of discharge instructions in chart.  Patient ambulated out with steady gait.  Patient has personal belongings and work note.

## 2017-07-23 NOTE — ED PROVIDER NOTES
"ED Provider Note    CHIEF COMPLAINT  Chief Complaint   Patient presents with   • Seizure     Pt BIB self to triage/ pt reports to have had a seizure around 4335-5290, pt reports to have hx of epilepsy/ pt on Keppra/ pt reports to increased dose recently       HPI  Orin White is a 22 y.o. female who presents requesting a work note, history of epilepsy on medication under the care of a neurologist, she had a seizure today but states this is normal for her. She is here because she needs a work note to return to work, she missed work last week because of multiple seizures for which she followed up with her neurologist and has a medication adjustment. Her boss at work will not accept her back without a work note.    REVIEW OF SYSTEMS  Negative for fever, rash, chest pain, dyspnea, abdominal pain, back pain.    PAST MEDICAL HISTORY   has a past medical history of Seizure (CMS-[facility]); Epilepsy (CMS-[facility]); and Depression.    SOCIAL HISTORY  Social History     Social History Main Topics   • Smoking status: Current Some Day Smoker     Types: Cigarettes   • Smokeless tobacco: Never Used   • Alcohol Use: 0.0 oz/week     0 Standard drinks or equivalent per week      Comment: occ.   • Drug Use: No   • Sexual Activity: Not Currently       SURGICAL HISTORY  patient denies any surgical history    CURRENT MEDICATIONS  I personally reviewed the medication list in the charting documentation.     ALLERGIES  No Known Allergies    PHYSICAL EXAM  VITAL SIGNS: /98 mmHg  Pulse 102  Temp(Src) 36.8 °C (98.3 °F)  Resp 16  Ht 1.499 m (4' 11.02\")  Wt 89.6 kg (197 lb 8.5 oz)  BMI 39.88 kg/m2  SpO2 97%  Constitutional: Alert in no apparent distress.  HENT: No signs of trauma.   Eyes: Conjunctiva normal, Non-icteric.   Chest: Normal nonlabored respirations  Skin: No erythema, No rash.   Musculoskeletal: Good range of motion in all major joints.   Neurologic: Alert, No focal deficits noted.   Psychiatric: Affect normal, Judgment " normal.    COURSE & MEDICAL DECISION MAKING  Pertinent Labs & Imaging studies reviewed. (See chart for details)    Encounter Summary: This is a 22 y.o. female with seizure disorder, requesting a work note. She has no acute complaints at this time. She did have a seizure today but is not here for evaluation of that.      DISPOSITION: Discharge Home      FINAL IMPRESSION  1. Seizure (CMS-[facility])        This dictation was created using voice recognition software. The accuracy of the dictation is limited to the abilities of the software. I expect there may be some errors of grammar and possibly content. The nursing notes were reviewed and certain aspects of this information were incorporated into this note.    Electronically signed by: Austin Ballesteros, 7/22/2017 7:08 PM

## 2017-07-23 NOTE — ED NOTES
Chief Complaint   Patient presents with   • Seizure     Pt BIB self to triage/ pt reports to have had a seizure around 7848-7544, pt reports to have hx of epilepsy/ pt on Keppra/ pt reports to increased dose recently     Explained to pt triage process, made pt aware to tell this RN of any changes/concerns, pt verbalized understanding of process and instructions given. Pt to [facility] cooper.

## 2017-08-22 ENCOUNTER — HOSPITAL ENCOUNTER (EMERGENCY)
Facility: MEDICAL CENTER | Age: 22
End: 2017-08-22
Attending: EMERGENCY MEDICINE

## 2017-08-22 VITALS
HEIGHT: 58 IN | WEIGHT: 170 LBS | DIASTOLIC BLOOD PRESSURE: 85 MMHG | SYSTOLIC BLOOD PRESSURE: 132 MMHG | RESPIRATION RATE: 20 BRPM | TEMPERATURE: 99.5 F | BODY MASS INDEX: 35.68 KG/M2 | OXYGEN SATURATION: 93 % | HEART RATE: 101 BPM

## 2017-08-22 DIAGNOSIS — G40.909 SEIZURE DISORDER (HCC): ICD-10-CM

## 2017-08-22 DIAGNOSIS — Z77.29 CARBON MONOXIDE EXPOSURE: ICD-10-CM

## 2017-08-22 LAB
ALBUMIN SERPL BCP-MCNC: 4.2 G/DL (ref 3.2–4.9)
ALBUMIN/GLOB SERPL: 1.4 G/DL
ALP SERPL-CCNC: 72 U/L (ref 30–99)
ALT SERPL-CCNC: 62 U/L (ref 2–50)
ANION GAP SERPL CALC-SCNC: 10 MMOL/L (ref 0–11.9)
AST SERPL-CCNC: 33 U/L (ref 12–45)
BASOPHILS # BLD AUTO: 0.3 % (ref 0–1.8)
BASOPHILS # BLD: 0.03 K/UL (ref 0–0.12)
BILIRUB SERPL-MCNC: 0.4 MG/DL (ref 0.1–1.5)
BUN SERPL-MCNC: 8 MG/DL (ref 8–22)
CALCIUM SERPL-MCNC: 9.4 MG/DL (ref 8.5–10.5)
CHLORIDE SERPL-SCNC: 106 MMOL/L (ref 96–112)
CO2 SERPL-SCNC: 18 MMOL/L (ref 20–33)
COHGB MFR BLD: 10.3 % (ref 0–4.9)
COHGB MFR BLD: 7.6 % (ref 0–4.9)
CREAT SERPL-MCNC: 0.86 MG/DL (ref 0.5–1.4)
EKG IMPRESSION: NORMAL
EOSINOPHIL # BLD AUTO: 0.12 K/UL (ref 0–0.51)
EOSINOPHIL NFR BLD: 1.2 % (ref 0–6.9)
ERYTHROCYTE [DISTWIDTH] IN BLOOD BY AUTOMATED COUNT: 41.2 FL (ref 35.9–50)
GFR SERPL CREATININE-BSD FRML MDRD: >60 ML/MIN/1.73 M 2
GLOBULIN SER CALC-MCNC: 3 G/DL (ref 1.9–3.5)
GLUCOSE SERPL-MCNC: 102 MG/DL (ref 65–99)
HCG SERPL QL: NEGATIVE
HCT VFR BLD AUTO: 44.4 % (ref 37–47)
HGB BLD-MCNC: 15.3 G/DL (ref 12–16)
IMM GRANULOCYTES # BLD AUTO: 0.09 K/UL (ref 0–0.11)
IMM GRANULOCYTES NFR BLD AUTO: 0.9 % (ref 0–0.9)
LYMPHOCYTES # BLD AUTO: 3.26 K/UL (ref 1–4.8)
LYMPHOCYTES NFR BLD: 31.3 % (ref 22–41)
MCH RBC QN AUTO: 30.5 PG (ref 27–33)
MCHC RBC AUTO-ENTMCNC: 34.5 G/DL (ref 33.6–35)
MCV RBC AUTO: 88.6 FL (ref 81.4–97.8)
MONOCYTES # BLD AUTO: 0.46 K/UL (ref 0–0.85)
MONOCYTES NFR BLD AUTO: 4.4 % (ref 0–13.4)
NEUTROPHILS # BLD AUTO: 6.46 K/UL (ref 2–7.15)
NEUTROPHILS NFR BLD: 61.9 % (ref 44–72)
NRBC # BLD AUTO: 0 K/UL
NRBC BLD AUTO-RTO: 0 /100 WBC
PLATELET # BLD AUTO: 329 K/UL (ref 164–446)
PMV BLD AUTO: 9.9 FL (ref 9–12.9)
POTASSIUM SERPL-SCNC: 3.7 MMOL/L (ref 3.6–5.5)
PROT SERPL-MCNC: 7.2 G/DL (ref 6–8.2)
RBC # BLD AUTO: 5.01 M/UL (ref 4.2–5.4)
SODIUM SERPL-SCNC: 134 MMOL/L (ref 135–145)
WBC # BLD AUTO: 10.4 K/UL (ref 4.8–10.8)

## 2017-08-22 PROCEDURE — 96374 THER/PROPH/DIAG INJ IV PUSH: CPT

## 2017-08-22 PROCEDURE — 700111 HCHG RX REV CODE 636 W/ 250 OVERRIDE (IP)

## 2017-08-22 PROCEDURE — 700105 HCHG RX REV CODE 258: Performed by: EMERGENCY MEDICINE

## 2017-08-22 PROCEDURE — 82375 ASSAY CARBOXYHB QUANT: CPT | Mod: 91

## 2017-08-22 PROCEDURE — 80053 COMPREHEN METABOLIC PANEL: CPT

## 2017-08-22 PROCEDURE — 85025 COMPLETE CBC W/AUTO DIFF WBC: CPT

## 2017-08-22 PROCEDURE — 96375 TX/PRO/DX INJ NEW DRUG ADDON: CPT

## 2017-08-22 PROCEDURE — 700111 HCHG RX REV CODE 636 W/ 250 OVERRIDE (IP): Performed by: EMERGENCY MEDICINE

## 2017-08-22 PROCEDURE — 36415 COLL VENOUS BLD VENIPUNCTURE: CPT

## 2017-08-22 PROCEDURE — 99284 EMERGENCY DEPT VISIT MOD MDM: CPT

## 2017-08-22 PROCEDURE — 96361 HYDRATE IV INFUSION ADD-ON: CPT

## 2017-08-22 PROCEDURE — 93005 ELECTROCARDIOGRAM TRACING: CPT | Performed by: EMERGENCY MEDICINE

## 2017-08-22 PROCEDURE — 84703 CHORIONIC GONADOTROPIN ASSAY: CPT

## 2017-08-22 RX ORDER — LORAZEPAM 2 MG/ML
2 INJECTION INTRAMUSCULAR ONCE
Status: DISCONTINUED | OUTPATIENT
Start: 2017-08-22 | End: 2017-08-22 | Stop reason: HOSPADM

## 2017-08-22 RX ORDER — SODIUM CHLORIDE 9 MG/ML
1000 INJECTION, SOLUTION INTRAVENOUS ONCE
Status: COMPLETED | OUTPATIENT
Start: 2017-08-22 | End: 2017-08-22

## 2017-08-22 RX ORDER — LORAZEPAM 2 MG/ML
INJECTION INTRAMUSCULAR
Status: COMPLETED
Start: 2017-08-22 | End: 2017-08-22

## 2017-08-22 RX ADMIN — LORAZEPAM 2 MG: 2 INJECTION INTRAMUSCULAR; INTRAVENOUS at 01:01

## 2017-08-22 RX ADMIN — DEXTROSE MONOHYDRATE 500 MG: 50 INJECTION, SOLUTION INTRAVENOUS at 01:30

## 2017-08-22 RX ADMIN — SODIUM CHLORIDE 1000 ML: 9 INJECTION, SOLUTION INTRAVENOUS at 00:18

## 2017-08-22 ASSESSMENT — PAIN SCALES - GENERAL: PAINLEVEL_OUTOF10: 0

## 2017-08-22 NOTE — ED NOTES
Pt discharged home. Discharge instructions reviewed with patient, all questions answered.   Pt ambulatory, vital signs stable.   Pt states they have a safe way home.

## 2017-08-22 NOTE — ED AVS SNAPSHOT
Home Care Instructions                                                                                                                Orin Rodas   MRN: [id number]    Department:  [facility], Emergency Dept   Date of Visit:  8/22/2017            [facility], Emergency Dept    [address]    [place] [address]    Phone:  [phone]      You were seen by     Wisam Sheridan M.D.      Your Diagnosis Was     Seizure disorder (CMS-[facility])     G40.909       These are the medications you received during your hospitalization from 08/22/2017 0004 to 08/22/2017 0403     Date/Time Order Dose Route Action    08/22/2017 0018 NS infusion 1,000 mL 1,000 mL Intravenous New Bag    08/22/2017 0101 LORAZEPAM 2 MG/ML INJ SOLN 2 mg  Given    08/22/2017 0130 levetiracetam (KEPPRA) 500 mg in D5W 100 mL IVPB 500 mg Intravenous New Bag      Follow-up Information     1. Follow up with Reanna Garvin M.D..    Specialty:  Family Medicine    Contact information    [address] [address] [phone]        Medication Information     Review all of your home medications and newly ordered medications with your primary doctor and/or pharmacist as soon as possible. Follow medication instructions as directed by your doctor and/or pharmacist.     Please keep your complete medication list with you and share with your physician. Update the information when medications are discontinued, doses are changed, or new medications (including over-the-counter products) are added; and carry medication information at all times in the event of emergency situations.               Medication List      ASK your doctor about these medications        Instructions    Morning Afternoon Evening Bedtime    amoxicillin 500 MG Caps   Commonly known as:  AMOXIL        Take 1 Cap by mouth 3 times a day.   Dose:  500 mg                        * escitalopram 10 MG Tabs   Commonly known as:  LEXAPRO        Take 10 mg by mouth every day.   Dose:  10 mg                        * escitalopram 10 MG Tabs   Commonly known as:  LEXAPRO        Take 1 Tab by mouth every day.   Dose:  10 mg                        gabapentin 300 MG Caps   Commonly known as:  NEURONTIN        Take 1 Cap by mouth every evening.   Dose:  300 mg                        hydrocodone-acetaminophen 5-325 MG Tabs per tablet   Commonly known as:  NORCO        Take 1-2 Tabs by mouth every 6 hours as needed.   Dose:  1-2 Tab                        hydrocortisone 1 % Crea        Apply 2 g to affected area(s) 4 times a day.   Dose:  2 g                        ibuprofen 200 MG Tabs   Commonly known as:  MOTRIN        Take 400 mg by mouth every 6 hours as needed for Mild Pain.   Dose:  400 mg                        * LamoTRIgine 200 MG Tb24        Take 400 mg by mouth every day.   Dose:  400 mg                        * LamoTRIgine 200 MG Tb24        Take 1 Cap by mouth every day.   Dose:  1 Cap                        levetiracetam 500 MG Tabs   Commonly known as:  KEPPRA        Take 1 Tab by mouth 2 times a day.   Dose:  500 mg                        Melatonin 10 MG Tbcr        Take 10 mg PE by mouth every bedtime.   Dose:  10 mg PE                        vitamin D (Ergocalciferol) 32460 units Caps capsule   Commonly known as:  DRISDOL        Take 1 Cap by mouth every 7 days.   Dose:  92710 Units                        * Notice:  This list has 4 medication(s) that are the same as other medications prescribed for you. Read the directions carefully, and ask your doctor or other care provider to review them with you.            Procedures and tests performed during your visit     BETA-HCG QUALITATIVE SERUM    CARBOXYHEMOGLOBIN    CARBOXYHEMOGLOBIN (carbon monoxide)    CBC WITH DIFFERENTIAL    COMP METABOLIC PANEL    EKG (ER)    ESTIMATED GFR    IV Saline Lock        Discharge Instructions       Carbon Monoxide, Protecting Yourself Following an Emergency  Carbon  monoxide (CO) is an odorless, colorless gas. It can can cause sudden illness and death if you breathe it. This can be harmful when power outages occur during emergencies such as hurricanes or winter storms. You may try to use alternative sources of fuel or electricity for heating, cooling, or cooking. CO from these sources can build up in your home, garage, or camper. It can poison the people and animals inside. If you are too hot or too cold, or you need to prepare food, do not put yourself and your family at risk. Go to friends or a community shelter for help. If you must use an alternative source of fuel or electricity, use it only outside. Be sure it is away from open windows.  CAUSES   Every year, more than 500 people die from accidental CO poisoning from motor vehicles alone. CO is found in combustion fumes, such as those produced by: small gasoline engines, stoves, generators, lanterns, and gas ranges, or burning charcoal and wood. CO from these sources can build up in enclosed or partially enclosed spaces.  People and animals in these spaces can be poisoned. They can can die from breathing CO in an enclosed or partially enclosed space. Never use these devices inside your home, basement, garage, or camper: generators, grills, camp stoves, or other gasoline, propane, natural gas, or charcoal-burning devices. Do not use them outside if they are near an open window.  SYMPTOMS   Exposure to CO can cause loss of consciousness and death. The most common symptoms of CO poisoning are:  · Headache.   · Vomiting.   · Nausea.   · Dizziness.   · Chest pain.   · Weakness.   · Confusion.   People who are sleeping or who have been drinking alcohol can die from CO poisoning before ever having symptoms. If you think you may have CO poisoning, consult a caregiver right away.  PREVENTION   Install a battery-operated CO detector in your home. Check or replace the battery when you change the time on your clocks each spring and  fall. Many units include both smoke and CO detectors. It is recommended that both be installed.  Important tips:  · Never use a gas range or oven to heat a home.   · Never use a charcoal grill, hibachi, lantern, or portable camping stove inside a home, tent, or camper.   · Never run a generator, , or any gasoline-powered engine inside a basement, garage, or other enclosed structure unless the equipment is installed and vented by a professional. Keep vents and flues free of debris, especially if winds are high. Flying debris can block ventilation lines.   · Never run a motor vehicle, generator, , or any gasoline-powered engine outside an open window or door where exhaust can vent into an enclosed area.   · Never leave the motor running in a vehicle parked in an enclosed or partially enclosed space, such as a garage.   · Heating systems, water heaters, and other appliances fueled by gas, oil, or coal should be regularly serviced by qualified technicians.   · Do not use portable, flameless chemical (catalytic) heaters indoors. These heaters do not have a flame, but they burn gas. They can cause CO to build up in a home, cabin, or camper.   · If you smell an odor coming from the cooling unit of a gas refrigerator, it should be serviced by a trained technician. An odor from the cooling unit can mean there is a defect in that unit. It could also be giving off CO.   · When purchasing gas equipment, buy only equipment carrying the seal of a national testing agency. The [employer] or [employer] are examples of these agencies.   Document Released: 10/14/2008 Document Revised: 03/11/2013 Document Reviewed: 10/14/2008  ExitCare® Patient Information ©2013 [employer].          Patient Information     Patient Information    Following emergency treatment: all patient requiring follow-up care must return either to a private physician or a clinic if your condition worsens  before you are able to obtain further medical attention, please return to the emergency room.     Billing Information    At [facility], we work to make the billing process streamlined for our patients.  Our Representatives are here to answer any questions you may have regarding your hospital bill.  If you have insurance coverage and have supplied your insurance information to us, we will submit a claim to your insurer on your behalf.  Should you have any questions regarding your bill, we can be reached online or by phone as follows:  Online: You are able pay your bills online or live chat with our representatives about any billing questions you may have. We are here to help Monday - Friday from 8:00am to 7:30pm and 9:00am - 12:00pm on Saturdays.  Please visit [email or web address]  for more information.   Phone:  [phone] or [phone]    Please note that your emergency physician, surgeon, pathologist, radiologist, anesthesiologist, and other specialists are not employed by [facility] and will therefore bill separately for their services.  Please contact them directly for any questions concerning their bills at the numbers below:     Emergency Physician Services:  [phone]  [facility]:  [phone]  [facility]:  [phone]  [facility]:  [phone]    1. Your final bill may vary from the amount quoted upon discharge if all procedures are not complete at that time, or if your doctor has additional procedures of which we are not aware. You will receive an additional bill if you return to the Emergency Department at [facility] for suture removal regardless of the facility of which the sutures were placed.     2. Please arrange for settlement of this account at the emergency registration.    3. All self-pay accounts are due in full at the time of treatment.  If you are unable to meet this obligation then payment is  expected within 4-5 days.     4. If you have had radiology studies (CT, X-ray, Ultrasound, MRI), you have received a preliminary result during your emergency department visit. Please contact the radiology department [phone] to receive a copy of your final result. Please discuss the Final result with your primary physician or with the follow up physician provided.     Crisis Hotline:  [place] Crisis Hotline:  6-500-RMTJIGG or 1-862.520.4526  [place] Crisis Hotline:    1-822.548.5255 or 499-705-6917         ED Discharge Follow Up Questions    1. In order to provide you with very good care, we would like to follow up with a phone call in the next few days.  May we have your permission to contact you?     YES /  NO    2. What is the best phone number to call you? (       )_____-__________    3. What is the best time to call you?      Morning  /  Afternoon  /  Evening                   Patient Signature:  ____________________________________________________________    Date:  ____________________________________________________________

## 2017-08-22 NOTE — ED NOTES
"Chief Complaint   Patient presents with   • Seizure     Blood pressure 132/85, pulse 139, temperature 37.5 °C (99.5 °F), resp. rate 20, height 1.473 m (4' 10\"), weight 77.111 kg (170 lb), SpO2 96 %.    Pt BIB EMS for above complaint    Per EMS report, pt had a witnessed x2 minute tonic-clonic seizure. Pt has hx of seizures and takes lamictal and keppra daily.Pt did not take these medications yesterday. Pt has evidence of oral trauma on tongue. Pt was A&O3 on EMS arrival.   "

## 2017-08-22 NOTE — ED AVS SNAPSHOT
8/22/2017    Orin Rodas  [address]  [place] [address]    Dear Orin:    [facility] wants to ensure your discharge home is safe and you or your loved ones have had all of your questions answered regarding your care after you leave the hospital.    Below is a list of resources and contact information should you have any questions regarding your hospital stay, follow-up instructions, or active medical symptoms.    Questions or Concerns Regarding… Contact   Medical Questions Related to Your Discharge  (7 days a week, 8am-5pm) Contact a Nurse Care Coordinator   [phone]   Medical Questions Not Related to Your Discharge  (24 hours a day / 7 days a week)  Contact the Nurse Health Line   [phone]    Medications or Discharge Instructions Refer to your discharge packet   or contact your [facility] Primary Care Provider   [phone]   Follow-up Appointment(s) Schedule your appointment via [employer]   or contact Scheduling [phone]   Billing Review your statement via [employer]  or contact Billing [phone]   Medical Records Review your records via [employer]   or contact Medical Records [phone]     You may receive a telephone call within two days of discharge. This call is to make certain you understand your discharge instructions and have the opportunity to have any questions answered. You can also easily access your medical information, test results and upcoming appointments via the [employer] free online health management tool. You can learn more and sign up at [employer] [employer] [facility]. For assistance setting up your [employer] account, please call [phone].    Once again, we want to ensure your discharge home is safe and that you have a clear understanding of any next steps in your care. If you have any questions or concerns, please do not hesitate to contact us, we are here for you. Thank you for choosing [facility] for your healthcare needs.    Sincerely,    Your [facility] Healthcare Team

## 2017-08-22 NOTE — DISCHARGE INSTRUCTIONS
Carbon Monoxide, Protecting Yourself Following an Emergency  Carbon monoxide (CO) is an odorless, colorless gas. It can can cause sudden illness and death if you breathe it. This can be harmful when power outages occur during emergencies such as hurricanes or winter storms. You may try to use alternative sources of fuel or electricity for heating, cooling, or cooking. CO from these sources can build up in your home, garage, or camper. It can poison the people and animals inside. If you are too hot or too cold, or you need to prepare food, do not put yourself and your family at risk. Go to friends or a community shelter for help. If you must use an alternative source of fuel or electricity, use it only outside. Be sure it is away from open windows.  CAUSES   Every year, more than 500 people die from accidental CO poisoning from motor vehicles alone. CO is found in combustion fumes, such as those produced by: small gasoline engines, stoves, generators, lanterns, and gas ranges, or burning charcoal and wood. CO from these sources can build up in enclosed or partially enclosed spaces.  People and animals in these spaces can be poisoned. They can can die from breathing CO in an enclosed or partially enclosed space. Never use these devices inside your home, basement, garage, or camper: generators, grills, camp stoves, or other gasoline, propane, natural gas, or charcoal-burning devices. Do not use them outside if they are near an open window.  SYMPTOMS   Exposure to CO can cause loss of consciousness and death. The most common symptoms of CO poisoning are:  · Headache.   · Vomiting.   · Nausea.   · Dizziness.   · Chest pain.   · Weakness.   · Confusion.   People who are sleeping or who have been drinking alcohol can die from CO poisoning before ever having symptoms. If you think you may have CO poisoning, consult a caregiver right away.  PREVENTION   Install a battery-operated CO detector in your home. Check or replace  the battery when you change the time on your clocks each spring and fall. Many units include both smoke and CO detectors. It is recommended that both be installed.  Important tips:  · Never use a gas range or oven to heat a home.   · Never use a charcoal grill, hibachi, lantern, or portable camping stove inside a home, tent, or camper.   · Never run a generator, , or any gasoline-powered engine inside a basement, garage, or other enclosed structure unless the equipment is installed and vented by a professional. Keep vents and flues free of debris, especially if winds are high. Flying debris can block ventilation lines.   · Never run a motor vehicle, generator, , or any gasoline-powered engine outside an open window or door where exhaust can vent into an enclosed area.   · Never leave the motor running in a vehicle parked in an enclosed or partially enclosed space, such as a garage.   · Heating systems, water heaters, and other appliances fueled by gas, oil, or coal should be regularly serviced by qualified technicians.   · Do not use portable, flameless chemical (catalytic) heaters indoors. These heaters do not have a flame, but they burn gas. They can cause CO to build up in a home, cabin, or camper.   · If you smell an odor coming from the cooling unit of a gas refrigerator, it should be serviced by a trained technician. An odor from the cooling unit can mean there is a defect in that unit. It could also be giving off CO.   · When purchasing gas equipment, buy only equipment carrying the seal of a national testing agency. The [employer] or [employer] are examples of these agencies.   Document Released: 10/14/2008 Document Revised: 03/11/2013 Document Reviewed: 10/14/2008  ExitCare® Patient Information ©2013 [employer].

## 2017-08-22 NOTE — ED AVS SNAPSHOT
License Buddy Access Code: Activation code not generated  Current License Buddy Status: Active    [email or web address]  A secure, online tool to manage your health information     [employer]’s License Buddy® is a secure, online tool that connects you to your personalized health information from the privacy of your home -- day or night - making it very easy for you to manage your healthcare. Once the activation process is completed, you can even access your medical information using the License Buddy janina, which is available for free in the [employer] Jan[employer] store or Google Play store.     License Buddy provides the following levels of access (as shown below):   My Chart Features   [facility] Primary Care Doctor [facility]  Specialists [facility]  Urgent  Care Non-[facility]  Primary Care  Doctor   Email your healthcare team securely and privately 24/7 X X X X   Manage appointments: schedule your next appointment; view details of past/upcoming appointments X      Request prescription refills. X      View recent personal medical records, including lab and immunizations X X X X   View health record, including health history, allergies, medications X X X X   Read reports about your outpatient visits, procedures, consult and ER notes X X X X   See your discharge summary, which is a recap of your hospital and/or ER visit that includes your diagnosis, lab results, and care plan. X X       How to register for License Buddy:  1. Go to  [email or web address].  2. Click on the Sign Up Now box, which takes you to the New Member Sign Up page. You will need to provide the following information:  a. Enter your License Buddy Access Code exactly as it appears at the top of this page. (You will not need to use this code after you’ve completed the sign-up process. If you do not sign up before the expiration date, you must request a new code.)   b. Enter your date of birth.   c. Enter your home email address.   d. Click Submit, and follow the next screen’s instructions.  3. Create a License Buddy ID. This will  be your [employer] login ID and cannot be changed, so think of one that is secure and easy to remember.  4. Create a [employer] password. You can change your password at any time.  5. Enter your Password Reset Question and Answer. This can be used at a later time if you forget your password.   6. Enter your e-mail address. This allows you to receive e-mail notifications when new information is available in [employer].  7. Click Sign Up. You can now view your health information.    For assistance activating your [employer] account, call [phone]

## 2017-08-22 NOTE — ED PROVIDER NOTES
"ED Provider Note    Scribed for Wisam Sheridan M.D. by Nata Schroeder. 8/22/2017, 12:09 AM.    Primary care provider: Reanna Garvin M.D.  Means of arrival: Ambulance  History obtained from: Patient  History limited by: None    CHIEF COMPLAINT  Chief Complaint   Patient presents with   • Seizure       HPI  Orin White is a 22 y.o. female with a medical history of seizures who presents to the Emergency Department via ambulance complaining of arm, tongue and head pain s/p seizure while walking to her car. Patient denies normally getting headaches after seizures. Confirms taking 750 mg of Keppra twice a day for her seizures and reports missing her dose today. Denies urinary incontinence, emesis, fever, diarrhea, rash.           REVIEW OF SYSTEMS  See HPI,  Negative for urinary incontinence, emesis, fever, diarrhea, rash. Remainder of ROS negative.     PAST MEDICAL HISTORY   has a past medical history of Seizure (CMS-HCC); Epilepsy (CMS-[facility]); and Depression.    SURGICAL HISTORY  patient denies any surgical history    SOCIAL HISTORY  Social History   Substance Use Topics   • Smoking status: Current Some Day Smoker     Types: Cigarettes   • Smokeless tobacco: Never Used   • Alcohol Use: No      History   Drug Use No       FAMILY HISTORY  Family History   Problem Relation Age of Onset   • Diabetes Maternal Grandmother        CURRENT MEDICATIONS  Reviewed. See Encounter Summary.     ALLERGIES  No Known Allergies    PHYSICAL EXAM  VITAL SIGNS: /85 mmHg  Pulse 139  Temp(Src) 37.5 °C (99.5 °F)  Resp 20  Ht 1.473 m (4' 10\")  Wt 77.111 kg (170 lb)  BMI 35.54 kg/m2  SpO2 96%  LMP 08/22/2017 (Exact Date)    Constitutional: Awake, alert in no apparent distress.  HENT: Partial thickness tongue abrasion. Atraumatic. Normocephalic, Bilateral external ears normal. Nose normal. No midline cervical tenderness, Nexus Criteria Negative.   Neck: No midline tenderness.  Eyes: Conjunctiva normal, non-icteric, EOMI.  "   Thorax & Lungs: Easy unlabored respirations, Clear to ascultation bilaterally.  Cardiovascular: Tachycardic. Regular rhythm, No murmurs, rubs or gallops.  Abdomen:  Soft, nontender, no masses   Skin: Visualized skin is  Dry, No erythema, No rash.   Extremities:   No cyanosis, clubbing or edema.  Neurologic: Alert, Grossly non-focal. Cranial nerves II 12 intact, normal speech, moving all extremities, no numbness or weakness.  Psychiatric: Normal affect, Normal mood      DIAGNOSTIC STUDIES / PROCEDURES           COURSE & MEDICAL DECISION MAKING  Pertinent Labs & Imaging studies reviewed. (See chart for details)    Differential diagnoses include but are not limited to: Seizure disorder, electrolyte abnormalities, Carbon monoxide poisoning    12:09 AM - Patient seen and examined at bedside. Patient will be treated with IV fluids for possible dehydration. Ordered CBC with differential, CMP,  Carbon monoxide, beta-HCG qualitative serum, EKG to evaluate her symptoms.     1:02 AM- patient had a seizure and is combative in a postictal state.    2:17 AM- patient is significantly improved, resting heart rate still in the 130s. Approximate 400 mL of IV fluids given.          The patient was informed of their blood pressure exceeding 120/80 and I have asked them to follow up with their primary care physician to discuss further monitoring and possible treatment.     Decision Making:  This is a 22 y.o. year old female who presents with recurrent seizures. She has a history of refractory seizures and is not significantly Keppra and lamotrigine at home. Unfortunately she missed the dosage for the past 24 hours which likely contributed to her seizure today. She was loaded with 5 mg of Keppra and 2 mg of Ativan. The patient is neurologically intact. She does not have a significant leukocytosis or sodium abnormality.    I did check a carbon monoxide level but is surprisingly elevated at 10%. The patient is not a smoker, she was at a  [employer] prior to the seizure. This could be the etiology of the elevated carbon monoxide level. She was placed on nasal cannula (she did not tolerate the nonrebreather). After 30 minutes, she had a reduction in the carbon monoxide level.    I did explain to the patient this elevated lab values in the significance of it. I recommend she check the carbon monoxide detectors at her house and make sure there is no gas leaking in the house.    I counseled her not to drive, operate any machinery, swimming open water, skydiving, scuba dive.    I recommend she follow her neurologist.    The patient will be discharged to home in stable condition.    FINAL IMPRESSION  1. Seizure disorder (CMS-HCC)    2. Carbon monoxide exposure          Nata HAYNES (Scribe), am scribing for, and in the presence of, Wisam Sheridan M.D..    Electronically signed by: Nata Schroeder (Scribe), 8/22/2017    IWisam M.D. personally performed the services described in this documentation, as scribed by Nata Schroeder in my presence, and it is both accurate and complete.    The note accurately reflects work and decisions made by me.  Wisam Sheridan  8/22/2017  2:24 AM

## 2017-08-22 NOTE — ED NOTES
Pt had a witnessed seizure lasted 3-4 minutes. ERP paged, 2 mg ativan given, oxygen and suction applied. New orders received for keppra

## 2017-12-04 ENCOUNTER — HOSPITAL ENCOUNTER (EMERGENCY)
Facility: MEDICAL CENTER | Age: 22
End: 2017-12-04
Attending: EMERGENCY MEDICINE

## 2017-12-04 VITALS
SYSTOLIC BLOOD PRESSURE: 131 MMHG | TEMPERATURE: 97.9 F | HEART RATE: 80 BPM | DIASTOLIC BLOOD PRESSURE: 87 MMHG | RESPIRATION RATE: 16 BRPM | WEIGHT: 186 LBS | HEIGHT: 59 IN | BODY MASS INDEX: 37.5 KG/M2 | OXYGEN SATURATION: 98 %

## 2017-12-04 DIAGNOSIS — R56.9 SEIZURE (HCC): ICD-10-CM

## 2017-12-04 LAB
ANION GAP SERPL CALC-SCNC: 6 MMOL/L (ref 0–11.9)
APPEARANCE UR: CLEAR
BACTERIA #/AREA URNS HPF: ABNORMAL /HPF
BASOPHILS # BLD AUTO: 0.5 % (ref 0–1.8)
BASOPHILS # BLD: 0.05 K/UL (ref 0–0.12)
BILIRUB UR QL STRIP.AUTO: NEGATIVE
BUN SERPL-MCNC: 12 MG/DL (ref 8–22)
CALCIUM SERPL-MCNC: 9.5 MG/DL (ref 8.5–10.5)
CHLORIDE SERPL-SCNC: 108 MMOL/L (ref 96–112)
CO2 SERPL-SCNC: 23 MMOL/L (ref 20–33)
COLOR UR: YELLOW
CREAT SERPL-MCNC: 0.64 MG/DL (ref 0.5–1.4)
CULTURE IF INDICATED INDCX: NO UA CULTURE
EOSINOPHIL # BLD AUTO: 0.08 K/UL (ref 0–0.51)
EOSINOPHIL NFR BLD: 0.7 % (ref 0–6.9)
EPI CELLS #/AREA URNS HPF: ABNORMAL /HPF
ERYTHROCYTE [DISTWIDTH] IN BLOOD BY AUTOMATED COUNT: 43 FL (ref 35.9–50)
GFR SERPL CREATININE-BSD FRML MDRD: >60 ML/MIN/1.73 M 2
GLUCOSE SERPL-MCNC: 80 MG/DL (ref 65–99)
GLUCOSE UR STRIP.AUTO-MCNC: NEGATIVE MG/DL
HCG SERPL QL: NEGATIVE
HCT VFR BLD AUTO: 45.8 % (ref 37–47)
HGB BLD-MCNC: 15.6 G/DL (ref 12–16)
HYALINE CASTS #/AREA URNS LPF: ABNORMAL /LPF
IMM GRANULOCYTES # BLD AUTO: 0.09 K/UL (ref 0–0.11)
IMM GRANULOCYTES NFR BLD AUTO: 0.8 % (ref 0–0.9)
KETONES UR STRIP.AUTO-MCNC: NEGATIVE MG/DL
LEUKOCYTE ESTERASE UR QL STRIP.AUTO: NEGATIVE
LYMPHOCYTES # BLD AUTO: 2.23 K/UL (ref 1–4.8)
LYMPHOCYTES NFR BLD: 20.1 % (ref 22–41)
MCH RBC QN AUTO: 32.2 PG (ref 27–33)
MCHC RBC AUTO-ENTMCNC: 34.1 G/DL (ref 33.6–35)
MCV RBC AUTO: 94.4 FL (ref 81.4–97.8)
MICRO URNS: ABNORMAL
MONOCYTES # BLD AUTO: 0.47 K/UL (ref 0–0.85)
MONOCYTES NFR BLD AUTO: 4.2 % (ref 0–13.4)
NEUTROPHILS # BLD AUTO: 8.17 K/UL (ref 2–7.15)
NEUTROPHILS NFR BLD: 73.7 % (ref 44–72)
NITRITE UR QL STRIP.AUTO: NEGATIVE
NRBC # BLD AUTO: 0 K/UL
NRBC BLD AUTO-RTO: 0 /100 WBC
PH UR STRIP.AUTO: 5 [PH]
PLATELET # BLD AUTO: 313 K/UL (ref 164–446)
PMV BLD AUTO: 9.8 FL (ref 9–12.9)
POTASSIUM SERPL-SCNC: 3.8 MMOL/L (ref 3.6–5.5)
PROT UR QL STRIP: 100 MG/DL
RBC # BLD AUTO: 4.85 M/UL (ref 4.2–5.4)
RBC # URNS HPF: ABNORMAL /HPF
RBC UR QL AUTO: ABNORMAL
SODIUM SERPL-SCNC: 137 MMOL/L (ref 135–145)
SP GR UR STRIP.AUTO: 1.02
UROBILINOGEN UR STRIP.AUTO-MCNC: 0.2 MG/DL
WBC # BLD AUTO: 11.1 K/UL (ref 4.8–10.8)
WBC #/AREA URNS HPF: ABNORMAL /HPF

## 2017-12-04 PROCEDURE — 80048 BASIC METABOLIC PNL TOTAL CA: CPT

## 2017-12-04 PROCEDURE — 85025 COMPLETE CBC W/AUTO DIFF WBC: CPT

## 2017-12-04 PROCEDURE — 81001 URINALYSIS AUTO W/SCOPE: CPT

## 2017-12-04 PROCEDURE — 36415 COLL VENOUS BLD VENIPUNCTURE: CPT

## 2017-12-04 PROCEDURE — 700105 HCHG RX REV CODE 258: Performed by: EMERGENCY MEDICINE

## 2017-12-04 PROCEDURE — 84703 CHORIONIC GONADOTROPIN ASSAY: CPT

## 2017-12-04 PROCEDURE — 99284 EMERGENCY DEPT VISIT MOD MDM: CPT

## 2017-12-04 RX ORDER — SODIUM CHLORIDE 9 MG/ML
1000 INJECTION, SOLUTION INTRAVENOUS ONCE
Status: COMPLETED | OUTPATIENT
Start: 2017-12-04 | End: 2017-12-04

## 2017-12-04 RX ADMIN — SODIUM CHLORIDE 1000 ML: 9 INJECTION, SOLUTION INTRAVENOUS at 22:21

## 2017-12-04 ASSESSMENT — PAIN SCALES - GENERAL: PAINLEVEL_OUTOF10: 0

## 2017-12-04 ASSESSMENT — LIFESTYLE VARIABLES: DO YOU DRINK ALCOHOL: NO

## 2017-12-05 NOTE — ED PROVIDER NOTES
ED Provider Note    Scribed for Dashawn Morris M.D. by Raciel Chaney. 12/4/2017, 9:54 PM.    Primary care provider: Pcp Pt States None  Means of arrival: Ambulance  History obtained from: Patient  History limited by: None    CHIEF COMPLAINT  Chief Complaint   Patient presents with   • Seizure     Pt had witnessed grand mal seizure by friend lasting approx 3 mins. No incontinence. Denies any trauma or pain.     KRISTAL Rodas is a 22 y.o. female who presents to the Emergency Department complaining of a witnessed seizure lasting around 3 minutes prior to arrival. The patient was sitting and watching a TV while eating a chicken wing when the symptoms started. Per friend, the patient rolled off the bed and had a full body shaking episode. The patient bit her tongue but did not have any urinary incontinence. Afterwards, the patient was confused for around 10-15 minutes. She takes 750mg of Kepra daily for her history of seizures and has not had a dose change recently. Denies any sickness including cough, fever, rhinorrhea, alcohol use. The patient follows with Dr. Butterfield, Neurology, who recommended a tilt table test which has not been scheduled yet. Her last seizure was around 2 months ago.    REVIEW OF SYSTEMS  Pertinent positives include seizure, tongue laceration, confusion. Pertinent negatives include cough, fever, rhinorrhea, alcohol use. All other systems negative.    C.    PAST MEDICAL HISTORY   has a past medical history of Depression; Epilepsy (CMS-[facility]); and Seizure (CMS-[facility]).    SURGICAL HISTORY  patient denies any surgical history    SOCIAL HISTORY  Social History   Substance Use Topics   • Smoking status: Current Some Day Smoker     Types: Cigarettes   • Smokeless tobacco: Never Used   • Alcohol use No      History   Drug Use No     FAMILY HISTORY  Family History   Problem Relation Age of Onset   • Diabetes Maternal Grandmother      CURRENT MEDICATIONS  No current facility-administered  "medications on file prior to encounter.      Current Outpatient Prescriptions on File Prior to Encounter   Medication Sig Dispense Refill   • amoxicillin (AMOXIL) 500 MG Cap Take 1 Cap by mouth 3 times a day. 30 Cap 0   • hydrocodone-acetaminophen (NORCO) 5-325 MG Tab per tablet Take 1-2 Tabs by mouth every 6 hours as needed. 15 Tab 0   • escitalopram (LEXAPRO) 10 MG Tab Take 1 Tab by mouth every day. 30 Tab 0   • LamoTRIgine 200 MG TABLET SR 24 HR Take 1 Cap by mouth every day. 30 Tab 0   • levetiracetam (KEPPRA) 500 MG Tab Take 1 Tab by mouth 2 times a day. 60 Tab 3   • hydrocortisone 1 % Cream Apply 2 g to affected area(s) 4 times a day. 1 Tube 0   • gabapentin (NEURONTIN) 300 MG Cap Take 1 Cap by mouth every evening. 30 Cap 11   • vitamin D, Ergocalciferol, (DRISDOL) 95085 UNITS Cap capsule Take 1 Cap by mouth every 7 days. 4 Cap 5   • escitalopram (LEXAPRO) 10 MG Tab Take 10 mg by mouth every day.     • ibuprofen (MOTRIN) 200 MG Tab Take 400 mg by mouth every 6 hours as needed for Mild Pain.     • Melatonin 10 MG Tab CR Take 10 mg PE by mouth every bedtime. 30 Tab 11   • LamoTRIgine 200 MG TABLET SR 24 HR Take 400 mg by mouth every day. 60 Tab 11     ALLERGIES  No Known Allergies    PHYSICAL EXAM  VITAL SIGNS: /87   Pulse (!) 119   Temp 36.6 °C (97.9 °F)   Resp 18   Ht 1.499 m (4' 11\")   Wt 84.4 kg (186 lb)   SpO2 95%   BMI 37.57 kg/m²     Constitutional: Well developed, Well nourished, Mild distress.   HENT: Normocephalic, Atraumatic, Abrasion to the right tongue which is non-suturable.  Eyes: Conjunctiva normal, No discharge.   Cardiovascular: Tachycardic heart rate, Normal rhythm, No murmurs, equal pulses.   Pulmonary: Normal breath sounds, No respiratory distress, No wheezing, No rales, No rhonchi.  Chest: No chest wall tenderness or deformity.   Abdomen:Soft, No tenderness, No masses, no rebound, no guarding.   Musculoskeletal: No major deformities noted to any extremity, No tenderness  Skin: " Warm, Dry, No erythema, No rash.   Neurologic: Alert & oriented x 3, Normal motor function,  No focal deficits noted.   Psychiatric: Affect normal, Judgment normal, Mood normal.     LABS  Results for orders placed or performed during the hospital encounter of 12/04/17   CBC WITH DIFFERENTIAL   Result Value Ref Range    WBC 11.1 (H) 4.8 - 10.8 K/uL    RBC 4.85 4.20 - 5.40 M/uL    Hemoglobin 15.6 12.0 - 16.0 g/dL    Hematocrit 45.8 37.0 - 47.0 %    MCV 94.4 81.4 - 97.8 fL    MCH 32.2 27.0 - 33.0 pg    MCHC 34.1 33.6 - 35.0 g/dL    RDW 43.0 35.9 - 50.0 fL    Platelet Count 313 164 - 446 K/uL    MPV 9.8 9.0 - 12.9 fL    Neutrophils-Polys 73.70 (H) 44.00 - 72.00 %    Lymphocytes 20.10 (L) 22.00 - 41.00 %    Monocytes 4.20 0.00 - 13.40 %    Eosinophils 0.70 0.00 - 6.90 %    Basophils 0.50 0.00 - 1.80 %    Immature Granulocytes 0.80 0.00 - 0.90 %    Nucleated RBC 0.00 /100 WBC    Neutrophils (Absolute) 8.17 (H) 2.00 - 7.15 K/uL    Lymphs (Absolute) 2.23 1.00 - 4.80 K/uL    Monos (Absolute) 0.47 0.00 - 0.85 K/uL    Eos (Absolute) 0.08 0.00 - 0.51 K/uL    Baso (Absolute) 0.05 0.00 - 0.12 K/uL    Immature Granulocytes (abs) 0.09 0.00 - 0.11 K/uL    NRBC (Absolute) 0.00 K/uL   BASIC METABOLIC PANEL   Result Value Ref Range    Sodium 137 135 - 145 mmol/L    Potassium 3.8 3.6 - 5.5 mmol/L    Chloride 108 96 - 112 mmol/L    Co2 23 20 - 33 mmol/L    Glucose 80 65 - 99 mg/dL    Bun 12 8 - 22 mg/dL    Creatinine 0.64 0.50 - 1.40 mg/dL    Calcium 9.5 8.5 - 10.5 mg/dL    Anion Gap 6.0 0.0 - 11.9   URINALYSIS CULTURE, IF INDICATED   Result Value Ref Range    Color Yellow     Character Clear     Specific Gravity 1.019 <1.035    Ph 5.0 5.0 - 8.0    Glucose Negative Negative mg/dL    Ketones Negative Negative mg/dL    Protein 100 (A) Negative mg/dL    Bilirubin Negative Negative    Urobilinogen, Urine 0.2 Negative    Nitrite Negative Negative    Leukocyte Esterase Negative Negative    Occult Blood Trace (A) Negative    Micro Urine Req  Microscopic     Culture Indicated No UA Culture   HCG Qual Serum   Result Value Ref Range    Beta-Hcg Qualitative Serum Negative Negative   URINE MICROSCOPIC (W/UA)   Result Value Ref Range    WBC 2-5 /hpf    RBC 0-2 /hpf    Bacteria Few (A) None /hpf    Epithelial Cells Few /hpf    Hyaline Cast 0-2 /lpf   ESTIMATED GFR   Result Value Ref Range    GFR If African American >60 >60 mL/min/1.73 m 2    GFR If Non African American >60 >60 mL/min/1.73 m 2     All labs reviewed by me.    COURSE & MEDICAL DECISION MAKING  Pertinent Labs & Imaging studies reviewed. (See chart for details)    9:54 PM - Patient seen and examined at bedside. Patient will be treated with IV fluids for tachycardia. Ordered Urinalysis, HCG Qual Serum, CBC, BMP to evaluate her symptoms. The differential diagnoses include but are not limited to: Seizure disorder, UTI, Electrolyte abnormality.    11:30 PM - Patient seen at bedside. I discussed the current lab results noted above and explained that they can be safely discharged. I advised the patient to follow-up with her neurologist for further evaluation to discuss her medications and possible testing. The patient was given return precautions such as new or worsening symptoms and she understands and verbalizes agreement.    Medical Decision Making:This point, I think this patient has had a seizure. She does have underlying seizure disorder. She has not missed any doses of her Keppra. Therefore at this point time I think the patient can be discharged home. She is not driving. Patient does not show any signs of electrolyte abnormality tract infection would be promoting her seizures.    The patient will return for new or worsening symptoms and is stable at the time of discharge.    The patient is referred to a primary physician for blood pressure management, diabetic screening, and for all other preventative health concerns.    DISPOSITION:  Patient will be discharged home in stable condition.    FOLLOW  UP:  Mk Butterfield M.D.  [address] [place] [address]  [address]  [facility] [id number] [phone]    Schedule an appointment as soon as possible for a visit in 2 days  To discuss medication adjustments    FINAL IMPRESSION  1. Seizure (CMS-HCC)          Raciel HAYNES (Scribe), am scribing for, and in the presence of, Dashawn Morris M.D.    Electronically signed by: Raciel Chaney (Nilsa), 12/4/2017    Dashawn HAYNES M.D. personally performed the services described in this documentation, as scribed by Raciel Chaney in my presence, and it is both accurate and complete.    The note accurately reflects work and decisions made by me.  Dashawn Morris  12/5/2017  4:08 AM

## 2017-12-05 NOTE — ED NOTES
Assist RN: Discharge instructions given to patient, a verbal understanding of all instructions was stated. IV removed, cathlon intact, site without s/s of infection. Pt preferred to walk out with steady gait accompanied by boyfriend. VSS, all belongings accounted for.

## 2017-12-05 NOTE — ED NOTES
"Chief Complaint   Patient presents with   • Seizure     Pt had witnessed grand mal seizure by friend lasting approx 3 mins. No incontinence. Denies any trauma or pain.     /87   Pulse (!) 114   Temp 36.6 °C (97.9 °F)   Resp 18   Ht 1.499 m (4' 11\")   Wt 84.4 kg (186 lb)   SpO2 96%   BMI 37.57 kg/m²     Pt brought in by [facility] from home, pt AOx4 on arrival. PIV placed pta. Placed in room BL 19. Complaints and vitals as above. Pt on monitors, all alarms audible. Chart flagged for ERP to see.  "

## 2017-12-05 NOTE — DISCHARGE INSTRUCTIONS
Return if you have a seizure lasting longer than 5 minutes, fever, or multiple seizures in a row without returning to normal.       Seizure, Adult  A seizure is abnormal electrical activity in the brain. Seizures usually last from 30 seconds to 2 minutes. There are various types of seizures.  Before a seizure, you may have a warning sensation (aura) that a seizure is about to occur. An aura may include the following symptoms:   · Fear or anxiety.  · Nausea.  · Feeling like the room is spinning (vertigo).  · Vision changes, such as seeing flashing lights or spots.  Common symptoms during a seizure include:  · A change in attention or behavior (altered mental status).  · Convulsions with rhythmic jerking movements.  · Drooling.  · Rapid eye movements.  · Grunting.  · Loss of bladder and bowel control.  · Bitter taste in the mouth.  · Tongue biting.  After a seizure, you may feel confused and sleepy. You may also have an injury resulting from convulsions during the seizure.  HOME CARE INSTRUCTIONS   · If you are given medicines, take them exactly as prescribed by your health care provider.  · Keep all follow-up appointments as directed by your health care provider.  · Do not swim or drive or engage in risky activity during which a seizure could cause further injury to you or others until your health care provider says it is OK.  · Get adequate rest.  · Teach friends and family what to do if you have a seizure. They should:  ¨ Lay you on the ground to prevent a fall.  ¨ Put a cushion under your head.  ¨ Loosen any tight clothing around your neck.  ¨ Turn you on your side. If vomiting occurs, this helps keep your airway clear.  ¨ Stay with you until you recover.  ¨ Know whether or not you need emergency care.  SEEK IMMEDIATE MEDICAL CARE IF:  · The seizure lasts longer than 5 minutes.  · The seizure is severe or you do not wake up immediately after the seizure.  · You have an altered mental status after the  seizure.  · You are having more frequent or worsening seizures.  Someone should drive you to the emergency department or call local emergency services (911 in [place]  MAKE SURE YOU:  · Understand these instructions.  · Will watch your condition.  · Will get help right away if you are not doing well or get worse.     This information is not intended to replace advice given to you by your health care provider. Make sure you discuss any questions you have with your health care provider.     Document Released: 12/15/2001 Document Revised: 01/08/2016 Document Reviewed: 07/30/2014  [employer] Patient Education ©2016 [employer]  Driving and Equipment Restrictions  Some medical problems make it dangerous to drive, ride a bike, or use machines. Some of these problems are:  · A hard blow to the head (concussion).  · Passing out (fainting).  · Twitching and shaking (seizures).  · Low blood sugar.  · Taking medicine to help you relax (sedatives).  · Taking pain medicines.  · Wearing an eye patch.  · Wearing splints. This can make it hard to use parts of your body that you need to drive safely.  HOME CARE   · Do not drive until your doctor says it is okay.  · Do not use machines until your doctor says it is okay.  You may need a form signed by your doctor (medical release) before you can drive again. You may also need this form before you do other tasks where you need to be fully alert.  MAKE SURE YOU:  · Understand these instructions.  · Will watch your condition.  · Will get help right away if you are not doing well or get worse.     This information is not intended to replace advice given to you by your health care provider. Make sure you discuss any questions you have with your health care provider.     Document Released: 01/25/2006 Document Revised: 03/11/2013 Document Reviewed: 04/27/2011  [employer] Patient Education ©2016 [employer]

## 2017-12-05 NOTE — ED NOTES
Blood drawn and sent to lab. Pt ambulated to bathroom with stand-by assist, gait steady. Urine sample collected and sent to lab. IVFs infusing.

## 2018-07-10 ENCOUNTER — HOSPITAL ENCOUNTER (EMERGENCY)
Facility: MEDICAL CENTER | Age: 23
End: 2018-07-11
Attending: EMERGENCY MEDICINE

## 2018-07-10 DIAGNOSIS — S00.512A ABRASION OF TONGUE, INITIAL ENCOUNTER: ICD-10-CM

## 2018-07-10 DIAGNOSIS — R56.9 SEIZURE (HCC): ICD-10-CM

## 2018-07-10 LAB
ALBUMIN SERPL BCP-MCNC: 4.8 G/DL (ref 3.2–4.9)
ALBUMIN/GLOB SERPL: 1.4 G/DL
ALP SERPL-CCNC: 74 U/L (ref 30–99)
ALT SERPL-CCNC: 42 U/L (ref 2–50)
ANION GAP SERPL CALC-SCNC: 21 MMOL/L (ref 0–11.9)
APPEARANCE UR: CLEAR
AST SERPL-CCNC: 28 U/L (ref 12–45)
BACTERIA #/AREA URNS HPF: ABNORMAL /HPF
BASOPHILS # BLD AUTO: 0.3 % (ref 0–1.8)
BASOPHILS # BLD: 0.05 K/UL (ref 0–0.12)
BILIRUB SERPL-MCNC: 0.4 MG/DL (ref 0.1–1.5)
BILIRUB UR QL STRIP.AUTO: NEGATIVE
BUN SERPL-MCNC: 13 MG/DL (ref 8–22)
CALCIUM SERPL-MCNC: 9.6 MG/DL (ref 8.5–10.5)
CHLORIDE SERPL-SCNC: 103 MMOL/L (ref 96–112)
CO2 SERPL-SCNC: 14 MMOL/L (ref 20–33)
COLOR UR: YELLOW
CREAT SERPL-MCNC: 0.82 MG/DL (ref 0.5–1.4)
EOSINOPHIL # BLD AUTO: 0.07 K/UL (ref 0–0.51)
EOSINOPHIL NFR BLD: 0.4 % (ref 0–6.9)
EPI CELLS #/AREA URNS HPF: ABNORMAL /HPF
ERYTHROCYTE [DISTWIDTH] IN BLOOD BY AUTOMATED COUNT: 45.2 FL (ref 35.9–50)
ETHANOL BLD-MCNC: 0 G/DL
GLOBULIN SER CALC-MCNC: 3.4 G/DL (ref 1.9–3.5)
GLUCOSE SERPL-MCNC: 118 MG/DL (ref 65–99)
GLUCOSE UR STRIP.AUTO-MCNC: NEGATIVE MG/DL
HCG SERPL QL: NEGATIVE
HCT VFR BLD AUTO: 49.7 % (ref 37–47)
HGB BLD-MCNC: 16.4 G/DL (ref 12–16)
IMM GRANULOCYTES # BLD AUTO: 0.15 K/UL (ref 0–0.11)
IMM GRANULOCYTES NFR BLD AUTO: 0.9 % (ref 0–0.9)
KETONES UR STRIP.AUTO-MCNC: NEGATIVE MG/DL
LACTATE BLD-SCNC: 1.2 MMOL/L (ref 0.5–2)
LEUKOCYTE ESTERASE UR QL STRIP.AUTO: NEGATIVE
LYMPHOCYTES # BLD AUTO: 3.33 K/UL (ref 1–4.8)
LYMPHOCYTES NFR BLD: 20.9 % (ref 22–41)
MCH RBC QN AUTO: 31.5 PG (ref 27–33)
MCHC RBC AUTO-ENTMCNC: 33 G/DL (ref 33.6–35)
MCV RBC AUTO: 95.4 FL (ref 81.4–97.8)
MICRO URNS: ABNORMAL
MONOCYTES # BLD AUTO: 0.66 K/UL (ref 0–0.85)
MONOCYTES NFR BLD AUTO: 4.1 % (ref 0–13.4)
NEUTROPHILS # BLD AUTO: 11.71 K/UL (ref 2–7.15)
NEUTROPHILS NFR BLD: 73.4 % (ref 44–72)
NITRITE UR QL STRIP.AUTO: NEGATIVE
NRBC # BLD AUTO: 0 K/UL
NRBC BLD-RTO: 0 /100 WBC
PH UR STRIP.AUTO: 7 [PH]
PLATELET # BLD AUTO: 344 K/UL (ref 164–446)
PMV BLD AUTO: 9.8 FL (ref 9–12.9)
POTASSIUM SERPL-SCNC: 3.7 MMOL/L (ref 3.6–5.5)
PROT SERPL-MCNC: 8.2 G/DL (ref 6–8.2)
PROT UR QL STRIP: 100 MG/DL
RBC # BLD AUTO: 5.21 M/UL (ref 4.2–5.4)
RBC # URNS HPF: ABNORMAL /HPF
RBC UR QL AUTO: ABNORMAL
SODIUM SERPL-SCNC: 138 MMOL/L (ref 135–145)
SP GR UR STRIP.AUTO: 1.02
UROBILINOGEN UR STRIP.AUTO-MCNC: 0.2 MG/DL
WBC # BLD AUTO: 16 K/UL (ref 4.8–10.8)
WBC #/AREA URNS HPF: ABNORMAL /HPF

## 2018-07-10 PROCEDURE — 96375 TX/PRO/DX INJ NEW DRUG ADDON: CPT

## 2018-07-10 PROCEDURE — 80053 COMPREHEN METABOLIC PANEL: CPT

## 2018-07-10 PROCEDURE — 36415 COLL VENOUS BLD VENIPUNCTURE: CPT

## 2018-07-10 PROCEDURE — 81001 URINALYSIS AUTO W/SCOPE: CPT

## 2018-07-10 PROCEDURE — 80307 DRUG TEST PRSMV CHEM ANLYZR: CPT

## 2018-07-10 PROCEDURE — 83605 ASSAY OF LACTIC ACID: CPT

## 2018-07-10 PROCEDURE — 85025 COMPLETE CBC W/AUTO DIFF WBC: CPT

## 2018-07-10 PROCEDURE — 96374 THER/PROPH/DIAG INJ IV PUSH: CPT

## 2018-07-10 PROCEDURE — 84703 CHORIONIC GONADOTROPIN ASSAY: CPT

## 2018-07-10 PROCEDURE — 700111 HCHG RX REV CODE 636 W/ 250 OVERRIDE (IP): Performed by: EMERGENCY MEDICINE

## 2018-07-10 PROCEDURE — 700102 HCHG RX REV CODE 250 W/ 637 OVERRIDE(OP): Performed by: EMERGENCY MEDICINE

## 2018-07-10 PROCEDURE — 99284 EMERGENCY DEPT VISIT MOD MDM: CPT

## 2018-07-10 PROCEDURE — A9270 NON-COVERED ITEM OR SERVICE: HCPCS | Performed by: EMERGENCY MEDICINE

## 2018-07-10 PROCEDURE — 700105 HCHG RX REV CODE 258: Performed by: EMERGENCY MEDICINE

## 2018-07-10 PROCEDURE — 700111 HCHG RX REV CODE 636 W/ 250 OVERRIDE (IP)

## 2018-07-10 PROCEDURE — 304561 HCHG STAT O2

## 2018-07-10 RX ORDER — LORAZEPAM 2 MG/ML
INJECTION INTRAMUSCULAR
Status: COMPLETED
Start: 2018-07-10 | End: 2018-07-10

## 2018-07-10 RX ORDER — ONDANSETRON 2 MG/ML
4 INJECTION INTRAMUSCULAR; INTRAVENOUS ONCE
Status: COMPLETED | OUTPATIENT
Start: 2018-07-10 | End: 2018-07-10

## 2018-07-10 RX ORDER — ACETAMINOPHEN 325 MG/1
650 TABLET ORAL ONCE
Status: COMPLETED | OUTPATIENT
Start: 2018-07-10 | End: 2018-07-10

## 2018-07-10 RX ORDER — LORAZEPAM 2 MG/ML
1 INJECTION INTRAMUSCULAR ONCE
Status: COMPLETED | OUTPATIENT
Start: 2018-07-10 | End: 2018-07-10

## 2018-07-10 RX ORDER — SODIUM CHLORIDE 9 MG/ML
1000 INJECTION, SOLUTION INTRAVENOUS ONCE
Status: COMPLETED | OUTPATIENT
Start: 2018-07-10 | End: 2018-07-11

## 2018-07-10 RX ADMIN — SODIUM CHLORIDE 1000 ML: 9 INJECTION, SOLUTION INTRAVENOUS at 23:37

## 2018-07-10 RX ADMIN — LORAZEPAM 1 MG: 2 INJECTION INTRAMUSCULAR; INTRAVENOUS at 20:15

## 2018-07-10 RX ADMIN — ACETAMINOPHEN 650 MG: 325 TABLET, FILM COATED ORAL at 22:25

## 2018-07-10 RX ADMIN — ONDANSETRON 4 MG: 2 INJECTION, SOLUTION INTRAMUSCULAR; INTRAVENOUS at 22:25

## 2018-07-10 RX ADMIN — LORAZEPAM 1 MG: 2 INJECTION INTRAMUSCULAR at 20:15

## 2018-07-10 ASSESSMENT — LIFESTYLE VARIABLES
TOTAL SCORE: 0
HAVE PEOPLE ANNOYED YOU BY CRITICIZING YOUR DRINKING: NO
EVER FELT BAD OR GUILTY ABOUT YOUR DRINKING: NO
TOTAL SCORE: 0
EVER HAD A DRINK FIRST THING IN THE MORNING TO STEADY YOUR NERVES TO GET RID OF A HANGOVER: NO
CONSUMPTION TOTAL: NEGATIVE
AVERAGE NUMBER OF DAYS PER WEEK YOU HAVE A DRINK CONTAINING ALCOHOL: 1
HAVE YOU EVER FELT YOU SHOULD CUT DOWN ON YOUR DRINKING: NO
DO YOU DRINK ALCOHOL: YES
HOW MANY TIMES IN THE PAST YEAR HAVE YOU HAD 5 OR MORE DRINKS IN A DAY: 0
TOTAL SCORE: 0
ON A TYPICAL DAY WHEN YOU DRINK ALCOHOL HOW MANY DRINKS DO YOU HAVE: 1

## 2018-07-10 ASSESSMENT — PAIN SCALES - GENERAL
PAINLEVEL_OUTOF10: 5
PAINLEVEL_OUTOF10: 6
PAINLEVEL_OUTOF10: 3

## 2018-07-11 VITALS
HEIGHT: 59 IN | WEIGHT: 190.48 LBS | TEMPERATURE: 98.8 F | BODY MASS INDEX: 38.4 KG/M2 | OXYGEN SATURATION: 88 % | RESPIRATION RATE: 23 BRPM | HEART RATE: 90 BPM | DIASTOLIC BLOOD PRESSURE: 85 MMHG | SYSTOLIC BLOOD PRESSURE: 126 MMHG

## 2018-07-11 LAB
ANION GAP SERPL CALC-SCNC: 10 MMOL/L (ref 0–11.9)
BUN SERPL-MCNC: 11 MG/DL (ref 8–22)
CALCIUM SERPL-MCNC: 8.1 MG/DL (ref 8.5–10.5)
CHLORIDE SERPL-SCNC: 109 MMOL/L (ref 96–112)
CO2 SERPL-SCNC: 21 MMOL/L (ref 20–33)
CREAT SERPL-MCNC: 0.58 MG/DL (ref 0.5–1.4)
GLUCOSE SERPL-MCNC: 94 MG/DL (ref 65–99)
POTASSIUM SERPL-SCNC: 3.6 MMOL/L (ref 3.6–5.5)
SODIUM SERPL-SCNC: 140 MMOL/L (ref 135–145)

## 2018-07-11 PROCEDURE — 36415 COLL VENOUS BLD VENIPUNCTURE: CPT

## 2018-07-11 PROCEDURE — 80048 BASIC METABOLIC PNL TOTAL CA: CPT

## 2018-07-11 NOTE — DISCHARGE INSTRUCTIONS
Return if you have another seizure lasting more than 5 minutes, multiple seizures in a row. not getting back to normal after, fever, swelling of the tongue.   Mouth Injury, Generic  Cuts and scrapes inside the mouth are common from bites and falls. They often look much worse than they really are and tend to bleed a lot. Small cuts and scrapes inside the mouth usually heal in 3 or 4 days.   HOME CARE INSTRUCTIONS   · If any of your teeth are broken, see your dentist. If baby teeth are knocked out, ask your dentist if further treatment is needed. If permanent teeth are knocked out, put them into a glass of cold milk until they can be immediately re-implanted. This should be done as soon as possible.   · Cold drinks or popsicles will help keep swelling down and lessen discomfort.   · After 1 day, gargle with warm salt water. Put ½ teaspoon (tsp) of salt into 8 ounces (oz) of warm water. Cuts in the mouth often look very grey or whitish and infected and that is because they are. The mouth is full of bacteria but injuries heal very well. Cuts that look quite bad cannot be noticed after a week or so.   · For bleeding of the inner lip or tissue that connects it to the gum, press the bleeding site against the teeth or jaw for 10 minutes. Once bleeding from inside the lip stops, do not pull the lip out again or the bleeding will start again.   · For bleeding from the tongue, squeeze or press the bleeding site with a sterile gauze or piece of clean cloth for 10 minutes.   · Only take over-the-counter or prescription medicines for pain, discomfort, or fever as directed by your caregiver. Do not take aspirin or you may bleed more.   · Eat a soft diet until healing is complete.   · Avoid any salty or citrus foods. They may sting your mouth.   · Rinse the wound with warm water immediately after meals.   SEEK MEDICAL CARE IF:   You have increasing pain or swelling.  SEEK IMMEDIATE MEDICAL CARE IF:   · You have a large amount of  bleeding that cannot be stopped.   · You have minor bleeding that will not stop after 10 minutes of direct pressure.   · You have severe pain.   · You cannot swallow, or you start to drool.   · You have a fever.   MAKE SURE YOU:   · Understand these instructions.   · Will watch your condition.   · Will get help right away if you are not doing well or get worse.   Document Released: 08/04/2005 Document Revised: 03/11/2013 Document Reviewed: 04/23/2009  ExitCare® Patient Information ©2013 [employer].      Seizure, Adult  A seizure is a sudden burst of abnormal electrical activity in the brain. The abnormal activity temporarily interrupts normal brain function, causing a person to experience any of the following:  · Involuntary movements.  · Changes in awareness or consciousness.  · Uncontrollable shaking (convulsions).  Seizures usually last from 30 seconds to 2 minutes. They usually do not cause permanent brain damage unless they are prolonged.  What can cause a seizure to happen?  Seizures can happen for many reasons including:  · A fever.  · Low blood sugar.  · A medicine.  · An illnesses.  · A brain injury.  Some people who have a seizure never have another one. People who have repeated seizures have a condition called epilepsy.  What are the symptoms of a seizure?  Symptoms of a seizure vary greatly from person to person. They include:  · Convulsions.  · Stiffening of the body.  · Involuntary movements of the arms or legs.  · Loss of consciousness.  · Breathing problems.  · Falling suddenly.  · Confusion.  · Head nodding.  · Eye blinking or fluttering.  · Lip smacking.  · Drooling.  · Rapid eye movements.  · Grunting.  · Loss of bladder control and bowel control.  · Staring.  · Unresponsiveness.  Some people have symptoms right before a seizure happens (aura) and right after a seizure happens. Symptoms of an aura include:  · Fear or anxiety.  · Nausea.  · Feeling like the room is spinning (vertigo).  · A  feeling of having seen or heard something before (katlin bruno).  · Odd tastes or smells.  · Changes in vision, such as seeing flashing lights or spots.  Symptoms that may follow a seizure include:  · Confusion.  · Sleepiness.  · Headache.  · Weakness of one side of the body.  Follow these instructions at home:  Medicines  · Take over-the-counter and prescription medicines only as told by your health care provider.  · Avoid any substances that may prevent your medicine from working properly, such as alcohol.  Activity  · Do not drive, swim, or do any other activities that would be dangerous if you had another seizure. Wait until your health care provider approves.  · If you live in the [place] check with your local DMV (department of [employer]) to find out about the local driving laws. Each state has specific rules about when you can legally return to driving.  · Get enough rest. Lack of sleep can make seizures more likely to occur.  Educating others  Teach friends and family what to do if you have a seizure. They should:  · Lay you on the ground to prevent a fall.  · Cushion your head and body.  · Loosen any tight clothing around your neck.  · Turn you on your side. If vomiting occurs, this helps keep your airway clear.  · Stay with you until you recover.  · Not hold you down. Holding you down will not stop the seizure.  · Not put anything in your mouth.  · Know whether or not you need emergency care.  General instructions  · Contact your health care provider each time you have a seizure.  · Avoid anything that has ever triggered a seizure for you.  · Keep a seizure diary. Record what you remember about each seizure, especially anything that might have triggered the seizure.  · Keep all follow-up visits as told by your health care provider. This is important.  Contact a health care provider if:  · You have another seizure.  · You have seizures more often.  · Your seizure symptoms change.  · You continue to have  seizures with treatment.  · You have symptoms of an infection or illness. They might increase your risk of having a seizure.  Get help right away if:  · You have a seizure:  ¨ That lasts longer than 5 minutes.  ¨ That is different than previous seizures.  ¨ That leaves you unable to speak or use a part of your body.  ¨ That makes it harder to breathe.  ¨ After a head injury.  · You have:  ¨ Multiple seizures in a row.  ¨ Confusion or a severe headache right after a seizure.  · You are having seizures more often.  · You do not wake up immediately after a seizure.  · You injure yourself during a seizure.  These symptoms may represent a serious problem that is an emergency. Do not wait to see if the symptoms will go away. Get medical help right away. Call your local emergency services (911 in the [place] Do not drive yourself to the hospital.   This information is not intended to replace advice given to you by your health care provider. Make sure you discuss any questions you have with your health care provider.  Document Released: 12/15/2001 Document Revised: 08/13/2017 Document Reviewed: 07/21/2017  Elsevier Interactive Patient Education © 2017 [employer]

## 2018-07-11 NOTE — ED NOTES
Patient discharged with instructions for seizure with prescriptions x0 signs and symptoms explained to patient for reasons to return to emergency department, Patient is understanding and has no further questions. Pt to follow up with neurology. Pt ambulatory to lobby with steady gait.

## 2018-07-11 NOTE — ED PROVIDER NOTES
ED Provider Note    Scribed for Dashawn Morris M.D. by Pilo Martinez. 7/10/2018, 8:18 PM.    Primary care provider: Reanna Garvin M.D.  Means of arrival: Ambulance  History obtained from: Patient, patient's mother  History limited by: None    CHIEF COMPLAINT  Chief Complaint   Patient presents with   • Seizure     patient had sz while at work today. patient reports hx of sz and is currently taking keppra.    • Tongue Laceration     s/p sz       HPI  Orin Rodas is a 22 y.o. female who presents to the Emergency Department for evaluation status post a seizure that occurred just prior to arrival. Per patient's mother, she was at work today when she began feeling dizzy and eventually experienced a clonic seizure. Mother reports that she does have a history of seizures and she is currently taking Keppra for this. She had her first seizure when she was 21 years old and her last seizure was about 2 months ago. Patient has no other pertinent past medical history and is not allergic to any medications. Her neurologist is Dr. Mk Butterfield. She denies any recent fever. Patient also denies any chance of pregnancy.     REVIEW OF SYSTEMS  Pertinent positives include clonic seizure, dizziness. Pertinent negatives include fever. All other systems negative.    C.     PAST MEDICAL HISTORY   has a past medical history of Depression; Epilepsy (HCC); and Seizure (HCC).    SURGICAL HISTORY  patient denies any surgical history    SOCIAL HISTORY  Social History   Substance Use Topics   • Smoking status: Current Some Day Smoker     Types: Cigarettes   • Smokeless tobacco: Never Used   • Alcohol use 0.0 oz/week      History   Drug Use   • Types: Inhaled     Comment: marijuana       FAMILY HISTORY  Family History   Problem Relation Age of Onset   • Diabetes Maternal Grandmother        CURRENT MEDICATIONS  Home Medications     Reviewed by Belle Hercules R.N. (Registered Nurse) on 07/10/18 at 9845  Med List Status: Complete  "  Medication Last Dose Status   levetiracetam (KEPPRA) 500 MG Tab 7/9/2018 Active                ALLERGIES  No Known Allergies    PHYSICAL EXAM  VITAL SIGNS: /85   Pulse 98   Temp 37.1 °C (98.8 °F)   Resp 14   Ht 1.499 m (4' 11\")   Wt 86.4 kg (190 lb 7.6 oz)   SpO2 94%   BMI 38.47 kg/m²     Constitutional: Well developed, Well nourished, mild distress. confused, postictal   HENT: Normocephalic, Oropharynx moist. contusion right side of tongue  Eyes: Conjunctiva normal, No discharge. Pupils are 3 mm equal and reactive   Cardiovascular: Normal heart rate, Normal rhythm, No murmurs, equal pulses.   Pulmonary: Normal breath sounds, No respiratory distress, No wheezing, No rales, No rhonchi.  Abdomen:Soft, No tenderness, No masses, no rebound, no guarding.   : Questionable urine incontinence   Musculoskeletal: No major deformities noted, No tenderness.   Skin: Warm, Dry, No erythema, No rash.   Neurologic: Alert & oriented x 3, Normal motor function,  No focal deficits noted. Moving all four extremiites  Psychiatric: Confused, postictal     LABS  Results for orders placed or performed during the hospital encounter of 07/10/18   HCG Qual Serum   Result Value Ref Range    Beta-Hcg Qualitative Serum Negative Negative   CBC WITH DIFFERENTIAL   Result Value Ref Range    WBC 16.0 (H) 4.8 - 10.8 K/uL    RBC 5.21 4.20 - 5.40 M/uL    Hemoglobin 16.4 (H) 12.0 - 16.0 g/dL    Hematocrit 49.7 (H) 37.0 - 47.0 %    MCV 95.4 81.4 - 97.8 fL    MCH 31.5 27.0 - 33.0 pg    MCHC 33.0 (L) 33.6 - 35.0 g/dL    RDW 45.2 35.9 - 50.0 fL    Platelet Count 344 164 - 446 K/uL    MPV 9.8 9.0 - 12.9 fL    Neutrophils-Polys 73.40 (H) 44.00 - 72.00 %    Lymphocytes 20.90 (L) 22.00 - 41.00 %    Monocytes 4.10 0.00 - 13.40 %    Eosinophils 0.40 0.00 - 6.90 %    Basophils 0.30 0.00 - 1.80 %    Immature Granulocytes 0.90 0.00 - 0.90 %    Nucleated RBC 0.00 /100 WBC    Neutrophils (Absolute) 11.71 (H) 2.00 - 7.15 K/uL    Lymphs (Absolute) 3.33 " 1.00 - 4.80 K/uL    Monos (Absolute) 0.66 0.00 - 0.85 K/uL    Eos (Absolute) 0.07 0.00 - 0.51 K/uL    Baso (Absolute) 0.05 0.00 - 0.12 K/uL    Immature Granulocytes (abs) 0.15 (H) 0.00 - 0.11 K/uL    NRBC (Absolute) 0.00 K/uL   COMP METABOLIC PANEL   Result Value Ref Range    Sodium 138 135 - 145 mmol/L    Potassium 3.7 3.6 - 5.5 mmol/L    Chloride 103 96 - 112 mmol/L    Co2 14 (L) 20 - 33 mmol/L    Anion Gap 21.0 (H) 0.0 - 11.9    Glucose 118 (H) 65 - 99 mg/dL    Bun 13 8 - 22 mg/dL    Creatinine 0.82 0.50 - 1.40 mg/dL    Calcium 9.6 8.5 - 10.5 mg/dL    AST(SGOT) 28 12 - 45 U/L    ALT(SGPT) 42 2 - 50 U/L    Alkaline Phosphatase 74 30 - 99 U/L    Total Bilirubin 0.4 0.1 - 1.5 mg/dL    Albumin 4.8 3.2 - 4.9 g/dL    Total Protein 8.2 6.0 - 8.2 g/dL    Globulin 3.4 1.9 - 3.5 g/dL    A-G Ratio 1.4 g/dL   URINALYSIS CULTURE, IF INDICATED   Result Value Ref Range    Color Yellow     Character Clear     Specific Gravity 1.025 <1.035    Ph 7.0 5.0 - 8.0    Glucose Negative Negative mg/dL    Ketones Negative Negative mg/dL    Protein 100 (A) Negative mg/dL    Bilirubin Negative Negative    Urobilinogen, Urine 0.2 Negative    Nitrite Negative Negative    Leukocyte Esterase Negative Negative    Occult Blood Moderate (A) Negative    Micro Urine Req Microscopic    ESTIMATED GFR   Result Value Ref Range    GFR If African American >60 >60 mL/min/1.73 m 2    GFR If Non African American >60 >60 mL/min/1.73 m 2   LACTIC ACID   Result Value Ref Range    Lactic Acid 1.2 0.5 - 2.0 mmol/L   DIAGNOSTIC ALCOHOL   Result Value Ref Range    Diagnostic Alcohol 0.00 0.00 g/dL   URINE MICROSCOPIC (W/UA)   Result Value Ref Range    WBC 5-10 (A) /hpf    RBC 0-2 /hpf    Bacteria Few (A) None /hpf    Epithelial Cells Few /hpf   BASIC METABOLIC PANEL   Result Value Ref Range    Sodium 140 135 - 145 mmol/L    Potassium 3.6 3.6 - 5.5 mmol/L    Chloride 109 96 - 112 mmol/L    Co2 21 20 - 33 mmol/L    Glucose 94 65 - 99 mg/dL    Bun 11 8 - 22 mg/dL     Creatinine 0.58 0.50 - 1.40 mg/dL    Calcium 8.1 (L) 8.5 - 10.5 mg/dL    Anion Gap 10.0 0.0 - 11.9   ESTIMATED GFR   Result Value Ref Range    GFR If African American >60 >60 mL/min/1.73 m 2    GFR If Non African American >60 >60 mL/min/1.73 m 2       All labs reviewed by me.    RADIOLOGY  No orders to display     The radiologist's interpretation of all radiological studies have been reviewed by me.    COURSE & MEDICAL DECISION MAKING  Pertinent Labs & Imaging studies reviewed. (See chart for details)    Reviewed patient's old medical records which showed that the patient was admitted to the hospital in February 2017.     8:18 PM - Patient seen and examined at bedside. Patient will be treated with 1 mg Ativan injection, 2mg Lorazepam.  Ordered HCG Qual serum, CBC with differential, CMP, urinalysis to evaluate her symptoms. The differential diagnoses include but are not limited to: Seizure, electrolyte abnormality, infectious     10:00 PM - Patient was reevaluated at bedside. The patient explains that she has been off of her Keppra for over a year and she admits to taking CBD oil for her seizures. She explains that she was still having seizures with her Keppra but she has not had as many seizures with the CBD oil. However, she notes that she forgot to take her CBD oil today and she attributes her seizure today to this. She is currently complaining of a 5/10 headache and is currently feeling nauseated. Discussed lab and radiology results with the patient and informed them that her electrolytes were slightly abnormal and she will need further evaluation. Also explained that she will be given anti-nausea medication as well as something for her headache. Patient understands and verbalizes agreement with the plan of care. Ordered 650 mg Tylenol tablet, 4 mg Zofran injection.     1:19 AM - Patient is feeling improved at this time and is cleared to be discharged with agreement of further plans of care.       Medical  Decision Making: At this point time I think the patient may have had a seizure secondary to missing her seizure medications.  The initial electrolyte abnormality with decreased bicarb is been corrected with 1 L normal saline.  Patient is back at her baseline.  She does not show any signs of infectious process.  Therefore I think the patient can be discharged to follow-up with her neurologist.  The patient will return for new or worsening symptoms and is stable at the time of discharge.    DISPOSITION:  Patient will be discharged home in stable condition.    FOLLOW UP:  No follow-up provider specified.     FINAL IMPRESSION  1. Seizure (HCC)    2. Abrasion of tongue, initial encounter          Pilo HAYNES (Scribe), am scribing for, and in the presence of, Dashawn Morris M.D.    Electronically signed by: Pilo Martinez (Scribe), 7/10/2018    Dashawn HAYNES M.D. personally performed the services described in this documentation, as scribed by Pilo Martinez in my presence, and it is both accurate and complete.    The note accurately reflects work and decisions made by me.  Dashawn Morris  7/11/2018  4:43 AM

## 2018-07-11 NOTE — ED NOTES
Pt has seizure activity in lobby.  Taken back to room 9 immediately.    Post ictal upon arrival to room.

## 2018-07-11 NOTE — ED TRIAGE NOTES
".Orin Rodas  .  Chief Complaint   Patient presents with   • Seizure     patient had sz while at work today. patient reports hx of sz and is currently taking keppra.    • Tongue Laceration     s/p sz     Patient BIB [facility] to triage from work with above complaint. Patient aao x 4. Hx of epilepsy. ./85   Pulse 98   Temp 37.1 °C (98.8 °F)   Resp 14   Ht 1.499 m (4' 11\")   Wt 86.4 kg (190 lb 7.6 oz)   SpO2 94%   BMI 38.47 kg/m²     Patient to lobby and instructed to inform staff of any needs.  "

## 2018-10-30 ENCOUNTER — TELEPHONE (OUTPATIENT)
Dept: NEUROLOGY | Facility: MEDICAL CENTER | Age: 23
End: 2018-10-30

## 2018-10-30 NOTE — TELEPHONE ENCOUNTER
Patient called [facility] to let our office know that she has switched to [employer] insurance. I called [facility] to let pt know we accept [employer] and if she needs to make a f/v to call our .

## 2018-11-28 ENCOUNTER — TELEPHONE (OUTPATIENT)
Dept: NEUROLOGY | Facility: MEDICAL CENTER | Age: 23
End: 2018-11-28

## 2018-11-29 ENCOUNTER — TELEPHONE (OUTPATIENT)
Dept: NEUROLOGY | Facility: MEDICAL CENTER | Age: 23
End: 2018-11-29

## 2018-11-29 NOTE — TELEPHONE ENCOUNTER
I called stated she will need to go through medical records to obtain her neuro records she stated she had already took care of it yesterday but wanted to make a follow up with dr ybarra to see him I stated he is booked until June 2019 but we can get her scheduled pt stated she will call back another time.

## 2018-12-27 ENCOUNTER — TELEPHONE (OUTPATIENT)
Dept: NEUROLOGY | Facility: MEDICAL CENTER | Age: 23
End: 2018-12-27

## 2018-12-27 NOTE — TELEPHONE ENCOUNTER
Patient states she got a job at a  and they are requiring her to get a letter from her neurologist stating it is ok to work given she has epilepsy. That she does not have restrictions.

## 2018-12-31 ENCOUNTER — TELEPHONE (OUTPATIENT)
Dept: NEUROLOGY | Facility: MEDICAL CENTER | Age: 23
End: 2018-12-31

## 2018-12-31 NOTE — TELEPHONE ENCOUNTER
"I called pt and informed her per Dr Butterfield: Well, typically I would have to state she is not having seizures and so far and she never followed with me after 2016, so yes, she will need to fu with either me or danilo for the letter.   Before you schedule her with Danilo ask her if ok to see this pt for the letter.   RA     Patient then stated \" alright thank you \" and goodbye.  "

## 2019-05-15 ENCOUNTER — OFFICE VISIT (OUTPATIENT)
Dept: URGENT CARE | Facility: MEDICAL CENTER | Age: 24
End: 2019-05-15
Payer: COMMERCIAL

## 2019-05-15 VITALS
DIASTOLIC BLOOD PRESSURE: 82 MMHG | TEMPERATURE: 98.3 F | SYSTOLIC BLOOD PRESSURE: 128 MMHG | HEART RATE: 64 BPM | BODY MASS INDEX: 37.57 KG/M2 | RESPIRATION RATE: 16 BRPM | WEIGHT: 186 LBS | OXYGEN SATURATION: 100 %

## 2019-05-15 DIAGNOSIS — A08.4 VIRAL GASTROENTERITIS: ICD-10-CM

## 2019-05-15 DIAGNOSIS — R11.10 VOMITING AND DIARRHEA: ICD-10-CM

## 2019-05-15 DIAGNOSIS — R19.7 VOMITING AND DIARRHEA: ICD-10-CM

## 2019-05-15 PROCEDURE — 99214 OFFICE O/P EST MOD 30 MIN: CPT | Performed by: NURSE PRACTITIONER

## 2019-05-15 RX ORDER — ZONISAMIDE 100 MG/1
200 CAPSULE ORAL DAILY
COMMUNITY
End: 2020-08-03

## 2019-05-15 ASSESSMENT — ENCOUNTER SYMPTOMS
CHILLS: 0
NUMBER OF EPISODES OF EMESIS TODAY: 1
VOMITING: 1
DIARRHEA: 1
FEVER: 0

## 2019-05-15 NOTE — PROGRESS NOTES
Subjective:      Orin Rodas is a 23 y.o. female who presents with Other (pt has epilepsy, pt has been feeling dizzy and disoriented, nausea, 2 days)            Emesis   This is a new problem. Episode onset: pt reports new onset vomiting and diarrhea that started yesterday. + hx of epilepsy, no new seizures. Episode frequency: reports she threw up 3 times yesterday but none since. The problem has been gradually improving. Associated symptoms include vomiting. Pertinent negatives include no chills or fever. Associated symptoms comments: She states she needs a note for work to allow her to return. She has tried rest for the symptoms. The treatment provided mild relief.       Review of Systems   Constitutional: Negative for chills and fever.   Gastrointestinal: Positive for diarrhea and vomiting.   All other systems reviewed and are negative.    Past Medical History:   Diagnosis Date   • Depression    • Epilepsy (HCC)    • Seizure (HCC)     since October    No past surgical history on file.   Social History     Social History   • Marital status:      Spouse name: N/A   • Number of children: N/A   • Years of education: N/A     Occupational History   • Not on file.     Social History Main Topics   • Smoking status: Former Smoker     Types: Cigarettes   • Smokeless tobacco: Never Used   • Alcohol use No   • Drug use: No      Comment: marijuana   • Sexual activity: Not Currently     Other Topics Concern   • Not on file     Social History Narrative    ** Merged History Encounter **         [employer] -senior    Lives with Mom, sisters, brother                  Objective:     /82   Pulse 64   Temp 36.8 °C (98.3 °F)   Resp 16   Wt 84.4 kg (186 lb)   SpO2 100%   BMI 37.57 kg/m²      Physical Exam   Constitutional: She is oriented to person, place, and time. Vital signs are normal. She appears well-developed and well-nourished.   HENT:   Head: Normocephalic and atraumatic.   Right Ear: External ear  normal.   Left Ear: External ear normal.   Nose: Nose normal.   Eyes: Pupils are equal, round, and reactive to light. EOM are normal.   Neck: Normal range of motion.   Cardiovascular: Normal rate and regular rhythm.    Pulmonary/Chest: Effort normal and breath sounds normal.   Abdominal: Soft. Normal appearance and bowel sounds are normal. There is no tenderness.   Musculoskeletal: Normal range of motion.   Neurological: She is alert and oriented to person, place, and time.   Skin: Skin is warm and dry. Capillary refill takes less than 2 seconds.   Psychiatric: She has a normal mood and affect. Her speech is normal and behavior is normal. Thought content normal.   Vitals reviewed.              Assessment/Plan:     1. Vomiting and diarrhea    2. Viral gastroenteritis    Discussed importance of hydration, low sugar gatorade and water  Encouraged rest and supportive care  Should not return to work if she has vomited in the last 24 hours  Work note provided  Strict ER precautions for return of excessive vomiting, inability to tolerate epilepsy meds or cannot hold down fluids  Supportive care, differential diagnoses, and indications for immediate follow-up discussed with patient.    Pathogenesis of diagnosis discussed including typical length and natural progression.      Instructed to return to  or nearest emergency department if symptoms fail to improve, for any change in condition, further concerns, or new concerning symptoms.  Patient states understanding of the plan of care and discharge instructions.

## 2019-05-15 NOTE — LETTER
May 15, 2019         Patient: Orin Rodas   YOB: 1995   Date of Visit: 5/15/2019           To Whom it May Concern:    Orin Rodas was seen in my clinic on 5/15/2019. Please excuse her from work 5/15/19-5/16/19.        Sincerely,           NATHALIE Bang.  Electronically Signed

## 2019-09-17 ENCOUNTER — APPOINTMENT (OUTPATIENT)
Dept: RADIOLOGY | Facility: MEDICAL CENTER | Age: 24
End: 2019-09-17
Attending: EMERGENCY MEDICINE
Payer: COMMERCIAL

## 2019-09-17 ENCOUNTER — HOSPITAL ENCOUNTER (EMERGENCY)
Facility: MEDICAL CENTER | Age: 24
End: 2019-09-17
Attending: EMERGENCY MEDICINE
Payer: COMMERCIAL

## 2019-09-17 VITALS
BODY MASS INDEX: 36.29 KG/M2 | WEIGHT: 180 LBS | DIASTOLIC BLOOD PRESSURE: 69 MMHG | HEART RATE: 82 BPM | HEIGHT: 59 IN | RESPIRATION RATE: 18 BRPM | OXYGEN SATURATION: 100 % | TEMPERATURE: 99.5 F | SYSTOLIC BLOOD PRESSURE: 124 MMHG

## 2019-09-17 DIAGNOSIS — R56.9 SEIZURE (HCC): ICD-10-CM

## 2019-09-17 LAB
ANION GAP SERPL CALC-SCNC: 15 MMOL/L (ref 0–11.9)
BASOPHILS # BLD AUTO: 0.5 % (ref 0–1.8)
BASOPHILS # BLD: 0.06 K/UL (ref 0–0.12)
BUN SERPL-MCNC: 15 MG/DL (ref 8–22)
CALCIUM SERPL-MCNC: 9.4 MG/DL (ref 8.5–10.5)
CHLORIDE SERPL-SCNC: 105 MMOL/L (ref 96–112)
CO2 SERPL-SCNC: 17 MMOL/L (ref 20–33)
CREAT SERPL-MCNC: 0.9 MG/DL (ref 0.5–1.4)
EKG IMPRESSION: NORMAL
EOSINOPHIL # BLD AUTO: 0.03 K/UL (ref 0–0.51)
EOSINOPHIL NFR BLD: 0.2 % (ref 0–6.9)
ERYTHROCYTE [DISTWIDTH] IN BLOOD BY AUTOMATED COUNT: 47.5 FL (ref 35.9–50)
GLUCOSE SERPL-MCNC: 106 MG/DL (ref 65–99)
HCG SERPL QL: NEGATIVE
HCT VFR BLD AUTO: 43.1 % (ref 37–47)
HGB BLD-MCNC: 13.9 G/DL (ref 12–16)
IMM GRANULOCYTES # BLD AUTO: 0.12 K/UL (ref 0–0.11)
IMM GRANULOCYTES NFR BLD AUTO: 1 % (ref 0–0.9)
LYMPHOCYTES # BLD AUTO: 2.7 K/UL (ref 1–4.8)
LYMPHOCYTES NFR BLD: 22.1 % (ref 22–41)
MCH RBC QN AUTO: 30.3 PG (ref 27–33)
MCHC RBC AUTO-ENTMCNC: 32.3 G/DL (ref 33.6–35)
MCV RBC AUTO: 94.1 FL (ref 81.4–97.8)
MONOCYTES # BLD AUTO: 0.51 K/UL (ref 0–0.85)
MONOCYTES NFR BLD AUTO: 4.2 % (ref 0–13.4)
NEUTROPHILS # BLD AUTO: 8.79 K/UL (ref 2–7.15)
NEUTROPHILS NFR BLD: 72 % (ref 44–72)
NRBC # BLD AUTO: 0 K/UL
NRBC BLD-RTO: 0 /100 WBC
PLATELET # BLD AUTO: 346 K/UL (ref 164–446)
PMV BLD AUTO: 9.5 FL (ref 9–12.9)
POTASSIUM SERPL-SCNC: 3.6 MMOL/L (ref 3.6–5.5)
RBC # BLD AUTO: 4.58 M/UL (ref 4.2–5.4)
SODIUM SERPL-SCNC: 137 MMOL/L (ref 135–145)
WBC # BLD AUTO: 12.2 K/UL (ref 4.8–10.8)

## 2019-09-17 PROCEDURE — 99284 EMERGENCY DEPT VISIT MOD MDM: CPT

## 2019-09-17 PROCEDURE — 93005 ELECTROCARDIOGRAM TRACING: CPT | Performed by: EMERGENCY MEDICINE

## 2019-09-17 PROCEDURE — 700102 HCHG RX REV CODE 250 W/ 637 OVERRIDE(OP): Performed by: EMERGENCY MEDICINE

## 2019-09-17 PROCEDURE — 84703 CHORIONIC GONADOTROPIN ASSAY: CPT

## 2019-09-17 PROCEDURE — 36415 COLL VENOUS BLD VENIPUNCTURE: CPT

## 2019-09-17 PROCEDURE — 70450 CT HEAD/BRAIN W/O DYE: CPT

## 2019-09-17 PROCEDURE — A9270 NON-COVERED ITEM OR SERVICE: HCPCS | Performed by: EMERGENCY MEDICINE

## 2019-09-17 PROCEDURE — 80048 BASIC METABOLIC PNL TOTAL CA: CPT

## 2019-09-17 PROCEDURE — 93005 ELECTROCARDIOGRAM TRACING: CPT

## 2019-09-17 PROCEDURE — 85025 COMPLETE CBC W/AUTO DIFF WBC: CPT

## 2019-09-17 RX ORDER — ZONISAMIDE 50 MG/1
200 CAPSULE ORAL ONCE
Status: COMPLETED | OUTPATIENT
Start: 2019-09-17 | End: 2019-09-17

## 2019-09-17 RX ADMIN — ZONISAMIDE 200 MG: 50 CAPSULE ORAL at 21:23

## 2019-09-17 ASSESSMENT — LIFESTYLE VARIABLES: DO YOU DRINK ALCOHOL: NO

## 2019-09-18 NOTE — ED NOTES
Patient ambulatory to and from  with constant SBA by ED RN. Steady gait. Tolerated well with minimal complaints of dizziness. Denies visual changes.

## 2019-09-18 NOTE — ED NOTES
Patient discharged in stable condition per orders. IV access removed - bandage applied. Wristband removed per protocol. Patient verbalized understanding of all discharge instructions. All belongings accounted for. Ambulatory to lobby with steady gait accompanied by  and friends.

## 2019-09-18 NOTE — DISCHARGE INSTRUCTIONS
Your CT head was unremarkable, your basic labs were also unremarkable.  Please follow-up with your neurologist for ongoing care and continue to take your seizure medication

## 2019-09-18 NOTE — ED TRIAGE NOTES
Orin Jesusitaseb Rodas  24 y.o.  Chief Complaint   Patient presents with   • Seizure     history of seizures, last in December     BIB EMS for above.    Today while at work patient became suddenly dizzy. Per coworkers present patient started spinning in circles, zoned out, was not speaking/answering questions, then passed out onto the floor. NO incontinence during episode.    Upon arrival to hospital patient A & O x 3, does not remember event. Calm and cooperative, conversing appropriately with staff. Denies nausea, dizziness, lightheadedness, visual changes.    Hematoma noted to R temple. + oral trauma to tongue. Palm of R hand noted with abrasions. Patient complains of pain 6/10 to all sites.    History of seizures - follows with Neurologist Dr. Mahmood in [place]. Has an appointment for a check-up 9/24/19.    Takes Zonisamide 200 mg PO daily for seizure control, last dose yesterday. States that she forgot to take her medication today, believes this caused her seizure.    Upon arrival patient changed into hospital gown and attached to vitals monitoring. Blood rainbow drawn and sent to lab. EKG completed at bedside.    Chart up for ERP.

## 2019-09-18 NOTE — ED PROVIDER NOTES
ED Provider Note    CHIEF COMPLAINT  Chief Complaint   Patient presents with   • Seizure     history of seizures, last in December       [facility]  Orin Rodas is a 24 y.o. female who presents with chief complaint of seizure.  Patient with a long-standing history of seizures.  She is been seen in the emergency department for seizure disorder.  She takes zonidamide 200 mg daily.  She missed her dose today.  She reports she is not pregnant.  Patient reports that she felt mildly dizzy and then had a episode of loss of consciousness, she reports she bit her tongue, coworkers reported convulsive episode.  Patient was then transported to our department.  She does not recall the incident.  She denies any headache nausea or vomiting.  She reports the dizziness is typical prior to her episodes of loss of consciousness and is thought to be her aura per her neurologist.  She denies any associated palpitations.  Patient reports she is not pregnant.  Patient did strike her head.    REVIEW OF SYSTEMS  ROS    See HPI for further details. All other systems are negative.     PAST MEDICAL HISTORY   has a past medical history of Depression, Epilepsy (HCC), and Seizure (HCC).    SOCIAL HISTORY  Social History     Tobacco Use   • Smoking status: Former Smoker     Types: Cigarettes   • Smokeless tobacco: Never Used   Substance and Sexual Activity   • Alcohol use: No     Alcohol/week: 0.0 oz   • Drug use: No   • Sexual activity: Not Currently       SURGICAL HISTORY  patient denies any surgical history    CURRENT MEDICATIONS  Home Medications     Reviewed by Laila Gagnon R.N. (Registered Nurse) on 09/17/19 at 2012  Med List Status: Complete   Medication Last Dose Status   zonisamide (ZONEGRAN) 100 MG Cap  Active                ALLERGIES  No Known Allergies    PHYSICAL EXAM  Physical Exam   Constitutional: She is oriented to person, place, and time. She appears well-developed and well-nourished.   HENT:   Head: Normocephalic.   Small  right-sided tongue abrasion, hematoma and abrasion overlying the frontotemporal junction on patient's right.   Eyes: Conjunctivae are normal.   Neck: Normal range of motion. Neck supple.   Cardiovascular: Normal rate and regular rhythm.   Pulmonary/Chest: Effort normal and breath sounds normal.   Abdominal: Soft. Bowel sounds are normal. She exhibits no distension. There is no tenderness. There is no rebound.   Neurological: She is alert and oriented to person, place, and time.   Distal and proximal strength 5/5 in upper and lower extremities. Normal gait. No dysmetria. No sensation deficits. No visual field deficits. Cranial nerves intact.      Skin: Skin is warm and dry. No rash noted.   Psychiatric: She has a normal mood and affect. Her behavior is normal.         DIAGNOSTIC STUDIES / PROCEDURES    EKG  See below    LABS  Results for orders placed or performed during the hospital encounter of 09/17/19   CBC WITH DIFFERENTIAL   Result Value Ref Range    WBC 12.2 (H) 4.8 - 10.8 K/uL    RBC 4.58 4.20 - 5.40 M/uL    Hemoglobin 13.9 12.0 - 16.0 g/dL    Hematocrit 43.1 37.0 - 47.0 %    MCV 94.1 81.4 - 97.8 fL    MCH 30.3 27.0 - 33.0 pg    MCHC 32.3 (L) 33.6 - 35.0 g/dL    RDW 47.5 35.9 - 50.0 fL    Platelet Count 346 164 - 446 K/uL    MPV 9.5 9.0 - 12.9 fL    Neutrophils-Polys 72.00 44.00 - 72.00 %    Lymphocytes 22.10 22.00 - 41.00 %    Monocytes 4.20 0.00 - 13.40 %    Eosinophils 0.20 0.00 - 6.90 %    Basophils 0.50 0.00 - 1.80 %    Immature Granulocytes 1.00 (H) 0.00 - 0.90 %    Nucleated RBC 0.00 /100 WBC    Neutrophils (Absolute) 8.79 (H) 2.00 - 7.15 K/uL    Lymphs (Absolute) 2.70 1.00 - 4.80 K/uL    Monos (Absolute) 0.51 0.00 - 0.85 K/uL    Eos (Absolute) 0.03 0.00 - 0.51 K/uL    Baso (Absolute) 0.06 0.00 - 0.12 K/uL    Immature Granulocytes (abs) 0.12 (H) 0.00 - 0.11 K/uL    NRBC (Absolute) 0.00 K/uL   Basic Metabolic Panel   Result Value Ref Range    Sodium 137 135 - 145 mmol/L    Potassium 3.6 3.6 - 5.5 mmol/L     Chloride 105 96 - 112 mmol/L    Co2 17 (L) 20 - 33 mmol/L    Glucose 106 (H) 65 - 99 mg/dL    Bun 15 8 - 22 mg/dL    Creatinine 0.90 0.50 - 1.40 mg/dL    Calcium 9.4 8.5 - 10.5 mg/dL    Anion Gap 15.0 (H) 0.0 - 11.9   BETA-HCG QUALITATIVE SERUM   Result Value Ref Range    Beta-Hcg Qualitative Serum Negative Negative   ESTIMATED GFR   Result Value Ref Range    GFR If African American >60 >60 mL/min/1.73 m 2    GFR If Non African American >60 >60 mL/min/1.73 m 2   EKG   Result Value Ref Range    Report       [facility] Emergency Dept.    Test Date:  2019  Pt Name:    FERNANDA JONES                 Department: ER  MRN:        [id number]                      Room:       [facility]  Gender:     Female                       Technician: [id number]  :        1995                   Requested By:ER TRIAGE PROTOCOL  Order #:    [id number]                    Reading MD: Sherri Fontana MD    Measurements  Intervals                                Axis  Rate:       100                          P:          39  NY:         136                          QRS:        19  QRSD:       80                           T:          14  QT:         324  QTc:        418    Interpretive Statements  EKG is sinus tachycardia, normal axis normal intervals no ST changes  consistent  with acute regional ischemia    Electronically Signed On 2019 20:27:21 PDT by Sherri Fontana MD           RADIOLOGY  CT-HEAD W/O   Final Result         1.  No acute intracranial abnormality.              COURSE & MEDICAL DECISION MAKING  Pertinent Labs & Imaging studies reviewed. (See chart for details)  Well-appearing patient here with symptoms most consistent with recurrent seizure.  She does have evidence of head trauma on exam and there was a loss of consciousness.  I discussed with patient utility of CT in a patient with head trauma and loss of consciousness the risk of recurrent CTs with radiation.  She would like to proceed with a CT.  Will  check CT head and basic labs.  Given patient's dizziness night dose.  She has a follow-up appointment with her neurologist next Tuesday  Basic labs are consistent with recent seizure.  CT is unremarkable.  Patient's EKG is reassuring.    The patient will return for worsening symptoms and is stable at the time of discharge. The patient verbalizes understanding and will comply.    FINAL IMPRESSION  1.   1. Seizure (HCC)               Electronically signed by: Addi Polanco, 9/17/2019 8:30 PM

## 2020-02-18 ENCOUNTER — HOSPITAL ENCOUNTER (EMERGENCY)
Facility: MEDICAL CENTER | Age: 25
End: 2020-02-18
Attending: EMERGENCY MEDICINE
Payer: COMMERCIAL

## 2020-02-18 VITALS
DIASTOLIC BLOOD PRESSURE: 70 MMHG | TEMPERATURE: 97.8 F | OXYGEN SATURATION: 100 % | BODY MASS INDEX: 34.27 KG/M2 | HEART RATE: 59 BPM | RESPIRATION RATE: 18 BRPM | HEIGHT: 59 IN | WEIGHT: 170 LBS | SYSTOLIC BLOOD PRESSURE: 119 MMHG

## 2020-02-18 DIAGNOSIS — L02.212 ABSCESS OF UPPER BACK EXCLUDING SCAPULAR REGION: ICD-10-CM

## 2020-02-18 PROCEDURE — A9270 NON-COVERED ITEM OR SERVICE: HCPCS

## 2020-02-18 PROCEDURE — 700102 HCHG RX REV CODE 250 W/ 637 OVERRIDE(OP)

## 2020-02-18 PROCEDURE — 10060 I&D ABSCESS SIMPLE/SINGLE: CPT

## 2020-02-18 PROCEDURE — 700101 HCHG RX REV CODE 250: Performed by: EMERGENCY MEDICINE

## 2020-02-18 PROCEDURE — 99283 EMERGENCY DEPT VISIT LOW MDM: CPT

## 2020-02-18 RX ORDER — CEPHALEXIN 250 MG/1
CAPSULE ORAL
Status: COMPLETED
Start: 2020-02-18 | End: 2020-02-18

## 2020-02-18 RX ORDER — CLINDAMYCIN HYDROCHLORIDE 300 MG/1
300 CAPSULE ORAL 3 TIMES DAILY
Qty: 15 CAP | Refills: 0 | Status: SHIPPED | OUTPATIENT
Start: 2020-02-18 | End: 2020-02-23

## 2020-02-18 RX ORDER — LIDOCAINE HYDROCHLORIDE 20 MG/ML
20 INJECTION, SOLUTION INFILTRATION; PERINEURAL ONCE
Status: COMPLETED | OUTPATIENT
Start: 2020-02-18 | End: 2020-02-18

## 2020-02-18 RX ORDER — CEPHALEXIN 250 MG/1
500 CAPSULE ORAL ONCE
Status: COMPLETED | OUTPATIENT
Start: 2020-02-18 | End: 2020-02-18

## 2020-02-18 RX ORDER — SULFAMETHOXAZOLE AND TRIMETHOPRIM 800; 160 MG/1; MG/1
TABLET ORAL
Status: COMPLETED
Start: 2020-02-18 | End: 2020-02-18

## 2020-02-18 RX ORDER — SULFAMETHOXAZOLE AND TRIMETHOPRIM 800; 160 MG/1; MG/1
1 TABLET ORAL ONCE
Status: COMPLETED | OUTPATIENT
Start: 2020-02-18 | End: 2020-02-18

## 2020-02-18 RX ADMIN — LIDOCAINE HYDROCHLORIDE 20 ML: 20 INJECTION, SOLUTION INFILTRATION; PERINEURAL at 01:13

## 2020-02-18 RX ADMIN — CEPHALEXIN 500 MG: 250 CAPSULE ORAL at 01:12

## 2020-02-18 RX ADMIN — SULFAMETHOXAZOLE AND TRIMETHOPRIM 1 TABLET: 800; 160 TABLET ORAL at 01:12

## 2020-02-18 NOTE — ED PROVIDER NOTES
"ED Provider Note    ED Provider Note    Scribed for Galo Vergara MD by Galo Vergara M.D.. 2/18/2020, 1:01 AM.    Primary care provider: Pcp Pt States None  Means of arrival: Private  History obtained from: Patient  History limited by: None    CHIEF COMPLAINT  Chief Complaint   Patient presents with   • Abscess       HPI  Orin Rodas is a 24 y.o. female who presents to the Emergency Department for evaluation of painful swelling to the lateral aspect of her left upper back, skin essentially overlying the posterior axillary line approximating the scapula on the left.  Patient notes she had what she thought was a \"bug bite\" about 2 weeks ago, but noted starting 2 days ago became more red, swollen, and more painful.  There is no active drainage.  Patient relates feeling tactilely febrile but is afebrile here in the ED.  She also no sensation of fatigue and malaise.  No injury known to the site, no active bleeding.  Patient is not a diabetic, not anticoagulated.    REVIEW OF SYSTEMS  Pertinent positives include abscess with cellulitis to the left upper back. Pertinent negatives include no actual fever, no vomiting, no trauma history.      PAST MEDICAL HISTORY   has a past medical history of Depression, Epilepsy (HCC), and Seizure (HCC).    SURGICAL HISTORY  patient denies any surgical history    SOCIAL HISTORY  Social History     Tobacco Use   • Smoking status: Former Smoker     Types: Cigarettes   • Smokeless tobacco: Never Used   Substance Use Topics   • Alcohol use: No     Alcohol/week: 0.0 oz   • Drug use: No      Social History     Substance and Sexual Activity   Drug Use No       FAMILY HISTORY  Family History   Problem Relation Age of Onset   • Diabetes Maternal Grandmother        CURRENT MEDICATIONS  Home Medications    **Home medications have not yet been reviewed for this encounter**         ALLERGIES  No Known Allergies    PHYSICAL EXAM  VITAL SIGNS: /70   Pulse (!) 59   Temp " "36.6 °C (97.8 °F) (Temporal)   Resp 18   Ht 1.499 m (4' 11\")   Wt 77.1 kg (170 lb)   LMP 02/04/2020   SpO2 100%   BMI 34.34 kg/m²     General: Alert, no acute distress  Skin: Warm, dry, normal for ethnicity.  4 cm in diameter area of erythema and induration with point of maximal fluctuance in the midline of the lesion.  There is surrounding erythema, no warmth, no fluctuance.  Localized to the skin overlying the posterior axillary line on the left approximately the scapula.  Head: Normocephalic, atraumatic  Neck: Trachea midline, no tenderness  Eye: PERRL, normal conjunctiva  ENMT: Oral mucosa moist, no pharyngeal erythema or exudate  Cardiovascular: Regular rate and rhythm, No murmur, Normal peripheral perfusion  Respiratory: Lungs CTA, respirations are non-labored, breath sounds are equal  Musculoskeletal: No swelling, no deformity  Neurological: Alert and oriented to person, place, time, and situation  Lymphatics: No lymphadenopathy  Psychiatric: Cooperative, appropriate mood & affect      RADIOLOGY  Bedside ultrasound performed by myself the skin of the left mid back shows indurated tissue, very small less than 1 cm area of fluid in the subcutaneous tissue    The radiologist's interpretation of all radiological studies have been reviewed by me.    COURSE & MEDICAL DECISION MAKING  Pertinent Labs & Imaging studies reviewed. (See chart for details)    1:01 AM - Patient seen and examined at bedside. Patient will be treated with local lidocaine and aspiration and drainage; Keflex and Bactrim.  Performed bedside ultrasound to evaluate her symptoms. The differential diagnoses include but are not limited to: abscess, cellulitis    0217: Patient tolerates I&D well, in no acute distress.    Incision and Drainage Procedure Note    Indication: Abscess    Procedure: The patient was positioned appropriately and the skin over the incision site was prepped with betadine and chlorhexidine and draped in a sterile fashion. " "Local anesthesia was obtained by infiltration using 2% Lidocaine without epinephrine.  An incision was then made over the apex of the lesion and approximately 5 cc of purulent and bloody material was expressed. Loculations were broken up using a hemostat and more of the material was able to be expressed. The drainage cavity was then irrigated, packed with sterile gauze and dressed with gauze.     The patient tolerated the procedure well.    Complications: None    Patient Vitals for the past 24 hrs:   BP Temp Temp src Pulse Resp SpO2 Height Weight   02/18/20 0115 119/70 -- -- (!) 59 -- 100 % -- --   02/18/20 0025 131/87 36.6 °C (97.8 °F) Temporal 71 18 98 % -- --   02/18/20 0024 -- -- -- -- -- -- 1.499 m (4' 11\") 77.1 kg (170 lb)         Decision Making:  This is a 24 y.o. year old female who presents with uncomplicated abscess.  Patient does relate that she feels ill but is well-appearing and nontoxic with no fever, no tachycardia, no hypotension.  Tolerates I&D quite well, there is surrounding cellulitis so she is written for antibiotics.    The patient will return for new or worsening symptoms and is stable at the time of discharge.    Patient has had high blood pressure while in the emergency department, felt likely secondary to medical condition. Counseled patient to monitor blood pressure at home and follow up with primary care physician.     DISPOSITION:  Patient will be discharged home in stable condition.    FOLLOW UP:  Zuleyka Culp M.D.  [address]  [address]  [facility] [address]  [phone]    Schedule an appointment as soon as possible for a visit in 2 days        OUTPATIENT MEDICATIONS:  Discharge Medication List as of 2/18/2020  1:30 AM      START taking these medications    Details   clindamycin (CLEOCIN) 300 MG Cap Take 1 Cap by mouth 3 times a day for 5 days., Disp-15 Cap, R-0, Print Rx Paper               FINAL IMPRESSION  1. Abscess of upper back excluding scapular region          I, " Galo Vergara M.D. (Scribe), am scribing for, and in the presence of, Galo Vergara MD.    Electronically signed by: Galo Vergara M.D. (Scribe), 2/18/2020    I, Galo Vergara MD personally performed the services described in this documentation, as scribed by Galo Vergara M.D. in my presence, and it is both accurate and complete    The note accurately reflects work and decisions made by me.  Galo Vergara M.D.  2/18/2020  2:11 AM

## 2020-02-18 NOTE — DISCHARGE INSTRUCTIONS
No work for the next 24 hours, no heavy lifting beyond 5 pounds.  Return to the emergency department or follow-up with primary care in 48 hours for wound check and packing change or removal.

## 2020-02-20 ENCOUNTER — OFFICE VISIT (OUTPATIENT)
Dept: URGENT CARE | Facility: MEDICAL CENTER | Age: 25
End: 2020-02-20
Payer: COMMERCIAL

## 2020-02-20 VITALS
OXYGEN SATURATION: 97 % | DIASTOLIC BLOOD PRESSURE: 80 MMHG | SYSTOLIC BLOOD PRESSURE: 120 MMHG | RESPIRATION RATE: 12 BRPM | TEMPERATURE: 97.6 F | HEIGHT: 59 IN | WEIGHT: 172 LBS | BODY MASS INDEX: 34.68 KG/M2 | HEART RATE: 72 BPM

## 2020-02-20 DIAGNOSIS — Z51.89 WOUND CHECK, ABSCESS: ICD-10-CM

## 2020-02-20 PROCEDURE — 99024 POSTOP FOLLOW-UP VISIT: CPT | Performed by: NURSE PRACTITIONER

## 2020-02-20 NOTE — PROGRESS NOTES
Subjective:      Orin Rodas is a 24 y.o. female who presents with Wound Check (10 day removal here to get patched removed)    Past Medical History:   Diagnosis Date   • Depression    • Epilepsy (HCC)    • Seizure (HCC)     since October     Social History     Socioeconomic History   • Marital status:      Spouse name: Not on file   • Number of children: Not on file   • Years of education: Not on file   • Highest education level: Not on file   Occupational History   • Not on file   Social Needs   • Financial resource strain: Not on file   • Food insecurity     Worry: Not on file     Inability: Not on file   • Transportation needs     Medical: Not on file     Non-medical: Not on file   Tobacco Use   • Smoking status: Former Smoker     Types: Cigarettes   • Smokeless tobacco: Never Used   Substance and Sexual Activity   • Alcohol use: No     Alcohol/week: 0.0 oz   • Drug use: No   • Sexual activity: Not Currently   Lifestyle   • Physical activity     Days per week: Not on file     Minutes per session: Not on file   • Stress: Not on file   Relationships   • Social connections     Talks on phone: Not on file     Gets together: Not on file     Attends [employer] service: Not on file     Active member of club or organization: Not on file     Attends meetings of clubs or organizations: Not on file     Relationship status: Not on file   • Intimate partner violence     Fear of current or ex partner: Not on file     Emotionally abused: Not on file     Physically abused: Not on file     Forced sexual activity: Not on file   Other Topics Concern   • Not on file   Social History Narrative    ** Merged History Encounter **         [employer] -senior    Lives with Mom, sisters, brother             Family History   Problem Relation Age of Onset   • Diabetes Maternal Grandmother        Allergies: Patient has no known allergies.    Patient is a 24-year-old female who presents today for wound check to the left upper back.   "She was seen in the emergency room 2 days ago and had incision and drainage done of an abscess to the left upper back.  Patient reports the area is feeling better although she does state that she has some itching.        Wound Check   She was originally treated 2 to 3 days ago. Previous treatment included I&D of abscess.       Review of Systems   Skin:        Incised abscess to the left upper back   All other systems reviewed and are negative.         Objective:     /80   Pulse 72   Temp 36.4 °C (97.6 °F) (Temporal)   Resp 12   Ht 1.499 m (4' 11\")   Wt 78 kg (172 lb)   LMP 02/04/2020   SpO2 97%   BMI 34.74 kg/m²      Physical Exam  Vitals signs reviewed.   Constitutional:       Appearance: Normal appearance.   Skin:     General: Skin is warm and dry.             Comments: Incision to the left upper back.  Packing is removed, there is no induration around the area at this time.  Wound is clean.  No requirement for repacking at this time, wound is also irrigated.   Neurological:      Mental Status: She is alert.                 Assessment/Plan:   Abscess, left upper back  Wound check    Dressing placed over  Keep area clean  Continue antibiotics  Return to office for any further questions or concerns    There are no diagnoses linked to this encounter.    "

## 2020-05-14 ENCOUNTER — NON-PROVIDER VISIT (OUTPATIENT)
Dept: URGENT CARE | Facility: PHYSICIAN GROUP | Age: 25
End: 2020-05-14

## 2020-05-14 DIAGNOSIS — Z11.1 VISIT FOR TB SKIN TEST: ICD-10-CM

## 2020-05-14 PROCEDURE — 86580 TB INTRADERMAL TEST: CPT | Performed by: PHYSICIAN ASSISTANT

## 2020-05-14 NOTE — NON-PROVIDER
Orin Rodas is a 24 y.o. female here for a non-provider visit for PPD placement -- Step 1 of 1    Reason for PPD:  work requirement    1. TB evaluation questionnaire completed by patient? Yes      -  If any answers marked yes did you contact a provider prior to placing? Not Indicated  2.  Patient notified to return to clinic for reading on: 5/16-17/20@[id number]  3.  PPD Placement documentation completed on TB evaluation questionnaire? Yes  4.  [facility]

## 2020-05-16 ENCOUNTER — NON-PROVIDER VISIT (OUTPATIENT)
Dept: URGENT CARE | Facility: PHYSICIAN GROUP | Age: 25
End: 2020-05-16

## 2020-05-16 LAB — TB WHEAL 3D P 5 TU DIAM: 0 MM

## 2020-05-16 NOTE — NON-PROVIDER
Orin Rodas is a 24 y.o. female here for a non-provider visit for PPD reading -- Step 1 of 1.      1.  Resulted in Epic under enter/edit results? Yes   2.  TB evaluation questionnaire scanned into chart and original given to patient?Yes      3. Was induration greater than 0 mm? No.        Routed to PCP? Yes

## 2020-07-30 ENCOUNTER — TELEPHONE (OUTPATIENT)
Dept: MEDICAL GROUP | Facility: PHYSICIAN GROUP | Age: 25
End: 2020-07-30

## 2020-07-31 ENCOUNTER — OFFICE VISIT (OUTPATIENT)
Dept: MEDICAL GROUP | Facility: PHYSICIAN GROUP | Age: 25
End: 2020-07-31
Payer: COMMERCIAL

## 2020-07-31 ENCOUNTER — HOSPITAL ENCOUNTER (OUTPATIENT)
Dept: LAB | Facility: MEDICAL CENTER | Age: 25
End: 2020-07-31
Attending: INTERNAL MEDICINE
Payer: COMMERCIAL

## 2020-07-31 ENCOUNTER — HOSPITAL ENCOUNTER (OUTPATIENT)
Dept: RADIOLOGY | Facility: MEDICAL CENTER | Age: 25
End: 2020-07-31
Attending: INTERNAL MEDICINE
Payer: COMMERCIAL

## 2020-07-31 VITALS
BODY MASS INDEX: 38.3 KG/M2 | OXYGEN SATURATION: 99 % | TEMPERATURE: 98.2 F | HEIGHT: 59 IN | HEART RATE: 63 BPM | SYSTOLIC BLOOD PRESSURE: 118 MMHG | RESPIRATION RATE: 14 BRPM | WEIGHT: 190 LBS | DIASTOLIC BLOOD PRESSURE: 78 MMHG

## 2020-07-31 DIAGNOSIS — M54.9 BILATERAL BACK PAIN, UNSPECIFIED BACK LOCATION, UNSPECIFIED CHRONICITY: ICD-10-CM

## 2020-07-31 DIAGNOSIS — M54.2 CERVICALGIA: ICD-10-CM

## 2020-07-31 DIAGNOSIS — D72.829 LEUKOCYTOSIS, UNSPECIFIED TYPE: ICD-10-CM

## 2020-07-31 DIAGNOSIS — R56.9 SEIZURE (HCC): ICD-10-CM

## 2020-07-31 DIAGNOSIS — E66.3 OVERWEIGHT: ICD-10-CM

## 2020-07-31 DIAGNOSIS — R73.9 HYPERGLYCEMIA: ICD-10-CM

## 2020-07-31 DIAGNOSIS — Z01.419 WELL WOMAN EXAM: ICD-10-CM

## 2020-07-31 PROBLEM — R55 SYNCOPE AND COLLAPSE: Status: RESOLVED | Noted: 2017-02-10 | Resolved: 2020-07-31

## 2020-07-31 PROBLEM — R55 SYNCOPE AND COLLAPSE: Status: ACTIVE | Noted: 2017-02-10

## 2020-07-31 LAB
ALT SERPL-CCNC: 21 U/L (ref 2–50)
ANION GAP SERPL CALC-SCNC: 11 MMOL/L (ref 7–16)
BASOPHILS # BLD AUTO: 0.3 % (ref 0–1.8)
BASOPHILS # BLD: 0.03 K/UL (ref 0–0.12)
BUN SERPL-MCNC: 10 MG/DL (ref 8–22)
CALCIUM SERPL-MCNC: 9.1 MG/DL (ref 8.5–10.5)
CHLORIDE SERPL-SCNC: 105 MMOL/L (ref 96–112)
CHOLEST SERPL-MCNC: 192 MG/DL (ref 100–199)
CO2 SERPL-SCNC: 21 MMOL/L (ref 20–33)
CREAT SERPL-MCNC: 0.65 MG/DL (ref 0.5–1.4)
CREAT UR-MCNC: 45.2 MG/DL
EOSINOPHIL # BLD AUTO: 0.14 K/UL (ref 0–0.51)
EOSINOPHIL NFR BLD: 1.5 % (ref 0–6.9)
ERYTHROCYTE [DISTWIDTH] IN BLOOD BY AUTOMATED COUNT: 45.3 FL (ref 35.9–50)
EST. AVERAGE GLUCOSE BLD GHB EST-MCNC: 105 MG/DL
FASTING STATUS PATIENT QL REPORTED: NORMAL
GLUCOSE SERPL-MCNC: 100 MG/DL (ref 65–99)
HBA1C MFR BLD: 5.3 % (ref 0–5.6)
HCT VFR BLD AUTO: 44 % (ref 37–47)
HDLC SERPL-MCNC: 33 MG/DL
HGB BLD-MCNC: 14.1 G/DL (ref 12–16)
IMM GRANULOCYTES # BLD AUTO: 0.05 K/UL (ref 0–0.11)
IMM GRANULOCYTES NFR BLD AUTO: 0.5 % (ref 0–0.9)
LDLC SERPL CALC-MCNC: 113 MG/DL
LYMPHOCYTES # BLD AUTO: 2.88 K/UL (ref 1–4.8)
LYMPHOCYTES NFR BLD: 30.3 % (ref 22–41)
MCH RBC QN AUTO: 31.3 PG (ref 27–33)
MCHC RBC AUTO-ENTMCNC: 32 G/DL (ref 33.6–35)
MCV RBC AUTO: 97.8 FL (ref 81.4–97.8)
MICROALBUMIN UR-MCNC: <1.2 MG/DL
MICROALBUMIN/CREAT UR: NORMAL MG/G (ref 0–30)
MONOCYTES # BLD AUTO: 0.54 K/UL (ref 0–0.85)
MONOCYTES NFR BLD AUTO: 5.7 % (ref 0–13.4)
NEUTROPHILS # BLD AUTO: 5.88 K/UL (ref 2–7.15)
NEUTROPHILS NFR BLD: 61.7 % (ref 44–72)
NRBC # BLD AUTO: 0 K/UL
NRBC BLD-RTO: 0 /100 WBC
PLATELET # BLD AUTO: 287 K/UL (ref 164–446)
PMV BLD AUTO: 10.2 FL (ref 9–12.9)
POTASSIUM SERPL-SCNC: 4.4 MMOL/L (ref 3.6–5.5)
RBC # BLD AUTO: 4.5 M/UL (ref 4.2–5.4)
SODIUM SERPL-SCNC: 137 MMOL/L (ref 135–145)
TRIGL SERPL-MCNC: 230 MG/DL (ref 0–149)
TSH SERPL DL<=0.005 MIU/L-ACNC: 1.37 UIU/ML (ref 0.38–5.33)
WBC # BLD AUTO: 9.5 K/UL (ref 4.8–10.8)

## 2020-07-31 PROCEDURE — 84460 ALANINE AMINO (ALT) (SGPT): CPT

## 2020-07-31 PROCEDURE — 99204 OFFICE O/P NEW MOD 45 MIN: CPT | Performed by: INTERNAL MEDICINE

## 2020-07-31 PROCEDURE — 80061 LIPID PANEL: CPT

## 2020-07-31 PROCEDURE — 72050 X-RAY EXAM NECK SPINE 4/5VWS: CPT

## 2020-07-31 PROCEDURE — 80203 DRUG SCREEN QUANT ZONISAMIDE: CPT

## 2020-07-31 PROCEDURE — 82570 ASSAY OF URINE CREATININE: CPT

## 2020-07-31 PROCEDURE — 83036 HEMOGLOBIN GLYCOSYLATED A1C: CPT

## 2020-07-31 PROCEDURE — 82043 UR ALBUMIN QUANTITATIVE: CPT

## 2020-07-31 PROCEDURE — 85025 COMPLETE CBC W/AUTO DIFF WBC: CPT

## 2020-07-31 PROCEDURE — 84443 ASSAY THYROID STIM HORMONE: CPT

## 2020-07-31 PROCEDURE — 36415 COLL VENOUS BLD VENIPUNCTURE: CPT

## 2020-07-31 PROCEDURE — 80048 BASIC METABOLIC PNL TOTAL CA: CPT

## 2020-07-31 ASSESSMENT — PATIENT HEALTH QUESTIONNAIRE - PHQ9: CLINICAL INTERPRETATION OF PHQ2 SCORE: 0

## 2020-07-31 NOTE — ASSESSMENT & PLAN NOTE
This has been a chronic condition noted for over 1 year.  The patient denies any recent trauma or injury.  Patient attributed the pain due to breast size.  She is interested in seeing plastic surgeon about breast reduction.  Referral submitted today.

## 2020-07-31 NOTE — ASSESSMENT & PLAN NOTE
This is a chronic condition.  The patient stated that she has been followed by a private neurologist who recently closed his practice.  Patient is here requesting referral to see [facility]ist.  She is presently taking Zonegran.  Patient has not had any seizure activity since October 2019.

## 2020-07-31 NOTE — ASSESSMENT & PLAN NOTE
Previous lab test show mildly elevated white blood count at 12.2.  This was noted in September 2019.  The patient is asymptomatic.  She is due for follow-up blood tests

## 2020-07-31 NOTE — PROGRESS NOTES
CC: Meet new provider  Patient requests referral to see plastic surgeon to discuss breast reduction surgery  Neurology referral request      HPI: 25 y.o. Patient presents to discuss the following:     Seizure (HCC)  This is a chronic condition.  The patient stated that she has been followed by a private neurologist who recently closed his practice.  Patient is here requesting referral to see [facility]ist.  She is presently taking Zonegran.  Patient has not had any seizure activity since October 2019.    Leukocytosis  Previous lab test show mildly elevated white blood count at 12.2.  This was noted in September 2019.  The patient is asymptomatic.  She is due for follow-up blood tests    Hyperglycemia  Previous lab test in September 2019 show glucose level mildly elevated at 106.  She is due for follow-up.    Cervicalgia  This has been a chronic condition noted for over 1 year.  The patient denies any recent trauma or injury.  Patient attributed the pain due to breast size.  She is interested in seeing plastic surgeon about breast reduction.  Referral submitted today.          REVIEW OF SYSTEMS:     Constitutional:  no fever / chills   Neurologic: no headaches, no numbness/tingling  Eyes: no changes in vision  ENT: no sore throat, no hearing loss  CV:  no chest pain, no palpitations  Pulmonary: no SOB, no cough    GI: no nausea / vomiting, no diarrhea, no constipation  :  no dysuria, no hematuria   Skin: no rash  Hematologic: no bleeding      Allergies: Patient has no known allergies.    Current Outpatient Medications Ordered in Epic   Medication Sig Dispense Refill   • zonisamide (ZONEGRAN) 100 MG Cap Take 200 mg by mouth every day.       No current [facility]-ordered facility-administered medications on file.        Past Medical History:   Diagnosis Date   • Depression    • Epilepsy (HCC)    • Seizure (HCC)     since October        History reviewed. No pertinent surgical history.     Family History   Problem  Relation Age of Onset   • Diabetes Maternal Grandmother         Social History     Tobacco Use   Smoking Status Former Smoker   • Types: Cigarettes   Smokeless Tobacco Never Used          Social History     Substance and Sexual Activity   Alcohol Use No   • Alcohol/week: 0.0 oz        ---------------------------------------------------------------------     PHYSICAL EXAM:   Vitals:    07/31/20 0846   BP: 118/78   Pulse: 63   Resp: 14   Temp: 36.8 °C (98.2 °F)   SpO2: 99%      Body mass index is 38.38 kg/m².        Constitutional: no acute distress  Eyes: PERRL, EOMI  Ears/nose/mouth: OP no exudates  Neck: supple, no JVD   Neck: no significant deformity, redness or swelling.   ROM of neck limited due to pain especially neck flexion/extension and lateral rotation.   Moderate tightness noted in the upper trapezius M region bilat.  CV: heart RRR  Resp: normal effort, no wheezing or rales.  GI: abdomen soft, no obvious mass, no tenderness  Neuro: CN 2-12 grossly intact  Skin: no obvious rash noted        ---------------------------------------------------------------------     ASSESSMENT and PLAN:  1. Seizure (HCC)  Chronic stable condition.  Advised the patient continue with current management.  Lab tests ordered.  - REFERRAL TO NEUROLOGY  - ZONISAMIDE (ZONEGRAN); Future    2. Leukocytosis, unspecified type  This was noted with previous lab test.  Patient asymptomatic.  - CBC WITH DIFFERENTIAL; Future    3. Hyperglycemia  This was noted with previous lab test.  Lab tests ordered for follow-up  - HEMOGLOBIN A1C; Future  - ALANINE AMINO-TRANS; Future  - Basic Metabolic Panel; Future  - Lipid Profile; Future  - TSH; Future  - MICROALBUMIN CREAT RATIO URINE; Future    4. Well woman exam    - REFERRAL TO GYNECOLOGY    5. Overweight  Advised the patient regarding diet and exercise.  Offered to refer the patient to health improvement program.  The patient will think about it.    6. Bilateral back pain, unspecified back  location, unspecified chronicity    - REFERRAL TO PLASTIC SURGERY    7. Cervicalgia    - REFERRAL TO PHYSICAL THERAPY Reason for Therapy: Eval/Treat/Report  - DX-CERVICAL SPINE-4+ VIEWS; Future            Return in about 6 months (around 1/31/2021) for routine followup.       PATIENT EDUCATION:  -If any problems should arise, patient was advised to contact our office or go to ER to be evaluated.  -Advised pt to follow a healthy diet and regular aerobic exercise regimen. Advised pt to avoid alcohol and tobacco use.    Please note that this dictation was created using voice recognition software. I have made every reasonable attempt to correct obvious errors, but it is possible there are errors of grammar and possibly content that I did not discover before finalizing the note.

## 2020-07-31 NOTE — ASSESSMENT & PLAN NOTE
Previous lab test in September 2019 show glucose level mildly elevated at 106.  She is due for follow-up.

## 2020-08-02 LAB — ZONISAMIDE SERPL-MCNC: 8 UG/ML (ref 10–40)

## 2020-08-03 ENCOUNTER — TELEPHONE (OUTPATIENT)
Dept: MEDICAL GROUP | Facility: PHYSICIAN GROUP | Age: 25
End: 2020-08-03

## 2020-08-03 DIAGNOSIS — R56.9 SEIZURE (HCC): Chronic | ICD-10-CM

## 2020-08-03 RX ORDER — ZONISAMIDE 100 MG/1
CAPSULE ORAL
Qty: 90 CAP | Refills: 5 | Status: SHIPPED | OUTPATIENT
Start: 2020-08-03 | End: 2021-02-26 | Stop reason: SDUPTHER

## 2020-08-03 NOTE — TELEPHONE ENCOUNTER
1st Attempt:    Left voicemail message for patient to call the office back at [phone]    Sent pt a my chart message.

## 2020-08-12 ENCOUNTER — PHYSICAL THERAPY (OUTPATIENT)
Dept: PHYSICAL THERAPY | Facility: REHABILITATION | Age: 25
End: 2020-08-12
Attending: INTERNAL MEDICINE
Payer: COMMERCIAL

## 2020-08-12 DIAGNOSIS — M54.2 CERVICALGIA: ICD-10-CM

## 2020-08-12 PROCEDURE — 97161 PT EVAL LOW COMPLEX 20 MIN: CPT

## 2020-08-12 PROCEDURE — 97110 THERAPEUTIC EXERCISES: CPT

## 2020-08-12 SDOH — ECONOMIC STABILITY: GENERAL: QUALITY OF LIFE: GOOD

## 2020-08-12 ASSESSMENT — ENCOUNTER SYMPTOMS
EXACERBATED BY: LIFTING
EXACERBATED BY: HOUSEWORK
PAIN SCALE AT HIGHEST: 6
PAIN SCALE: 5
EXACERBATED BY: STANDING
QUALITY: TIGHTNESS
EXACERBATED BY: BENDING
PAIN TIMING: IN THE EVENING
QUALITY: TIGHT
QUALITY: SHARP
ALLEVIATING FACTORS: HEATING PAD
PAIN SCALE AT LOWEST: 0
ALLEVIATING FACTORS: COLD/ICE
AWAKENINGS PER NIGHT: 4

## 2020-08-12 NOTE — OP THERAPY EVALUATION
Outpatient Physical Therapy  INITIAL EVALUATION    [facility] [address], [address]  [place] [address]  Phone:  [phone]    Date of Evaluation: 2020    Patient: Orin Rodas  YOB: 1995  MRN: [id number]     Referring Provider: Ed Patel M.D.  [address] [place] [id number]   Referring Diagnosis Cervicalgia [M54.2]     Time Calculation    Start time: 1030  Stop time: 1130 Time Calculation (min): 60 minutes         Chief Complaint: Neck Pain    Visit Diagnoses     ICD-10-CM   1. Cervicalgia  M54.2         Subjective:   History of Present Illness:     Date of onset:  2020    Mechanism of injury:  The patient is a 25 year old female who reports having upper and lower back pain for the last 6-7 years. 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 The patient is not sure if  weightbearing activity is more aggravating or not. She has difficulty sleeping at night   Quality of life:  Good  Sleep disturbance:  Interrupted sleep  # Times/Night awakened:  4  Pain:     Current pain ratin    At best pain ratin    At worst pain ratin    Quality:  Sharp, tightness and tight    Pain timing:  In the evening    Relieving factors:  Heating pad and cold/ice    Aggravating factors:  Bending, lifting, housework and standing  Social Support:     Lives in:  Multiple-level home  Hand dominance:  Right  Treatments:     Previous treatment:  Chiropractic  Patient Goals:     Other patient goals:  To increase mobility and decrease pain to the back       Past Medical History:   Diagnosis Date   • Depression    • Epilepsy (HCC)    • Seizure (HCC)     since October     No past surgical history on file.  Social History     Tobacco Use   • Smoking status: Former Smoker     Types: Cigarettes   • Smokeless tobacco: Never Used   Substance Use Topics   • Alcohol use: No     Alcohol/week: 0.0  "oz     Family and Occupational History     Socioeconomic History   • Marital status: Other     Spouse name: Not on file   • Number of children: Not on file   • Years of education: Not on file   • Highest education level: Not on file   Occupational History   • Not on file       Objective     Static Posture     Lumbar Spine   Increased lordosis.     Pelvis   Anterior pelvic tilt  Pelvis (Right): Elevated.     Postural Observations  Seated posture: fair  Standing posture: fair  Correction of posture: has no consistent effect    Additional Postural Observation Details  90/90 SLR; (R) LE at 32 deg and (L) LE at 35 deg extension; hamstring tightness present     Tenderness     Right Shoulder  Tenderness in the medial scapula.     Additional Tenderness Details  Increased tenderness to the medial scapular ridge with mid to upper trapezius tightness and.     Active Range of Motion     Lumbar   Flexion: within functional limits  Extension: decreased  Left lateral flexion: within functional limits  Right lateral flexion: within functional limits  Left rotation: within functional limits  Right rotation: within functional limits    Additional Active Range of Motion Details  Patient reports trunk extension motion \" feels like my back is going to pop\"    Strength:      Back   Flexion: 4+  Extension: 4+  Lateral bend left: 5  Lateral bend right: 4+  Rotation left: 4+  Rotation right: 5    Left Hip   Planes of Motion   Flexion: 5  Extension: 5  Abduction: 5  Adduction: 5    Right Hip   Planes of Motion   Flexion: 5  Extension: 5  Abduction: 5  Adduction: 5    Left Knee   Flexion: 5  Extension: 5    Right Knee   Flexion: 5  Extension: 5    Left Ankle/Foot   Dorsiflexion: 5  Plantar flexion: 5    Right Ankle/Foot   Dorsiflexion: 5  Plantar flexion: 5    Tests       Lumbar spine (left)      Negative slump.   Lumbar spine (right)     Negative slump.     Left Hip   Negative SI compression and SI distraction.     Right Hip   Negative SI " compression and SI distraction.     Additional Tests Details  Bilateral hamstring tightness with lower back and leg restriction in ROM         Therapeutic Exercises (CPT 11546):     1. Ab bracing , 5x    2. Posterior pelvic tilts , 10x    3. Hamstring stretches    4. Thoracic extensions in chair    5. Mid rows wall lift offs    6. Prone Y's & T's    7. Shoulder retractions     8. Bridges     9. Prone sh extensions    Therapeutic Treatments and Modalities:     1. Manual Therapy (CPT 89710), C/T/L     Time-based treatments/modalities:    Physical Therapy Timed Treatment Charges  Therapeutic exercise minutes (CPT 69032): 15 minutes      Assessment, Response and Plan:   Impairments: activity intolerance, difficulty performing job, lacks appropriate home exercise program and limited mobility    Assessment details:  The patient is a 25 year old female who reports chronic neck and back pain for several years but more recently at her job while lifting and bending to perform job duties. She describes working as a  and squatting bending and lifting with back pain. She reports that she believes the size of her bust size has significant reason for her symptoms. Observed increased lumbar lordosis and slight elevation to the (R) pelvis PSIS. AROM in trunk extension limited due to pain and restriction; all other directions WFL's.  Strength in back flexion and extension 4+/5.  Special tests (-) for slump and SLR; 90/90 SLR (+) bilateral hamstring tightness at 32-35 degrees upon measurement from supine. Increased tenderness to the postural muscles of the (R) >(L) upper traps, levator scap and rhomboids; increased tightness to pectoral major/minor and anterior delts bilaterally. Posture is fair in sitting and standing. Patient would benefit from skilled physical therapy to improve postural positioning, strength and conditioning and mobility with fnctional movement and activity tolerance.  Barriers to therapy:  None  Other  barriers to therapy:  Contra indicated for electrical stimulation due to hx of seizures   Prognosis: good    Goals:   Short Term Goals:   1) Indep with HEP  2) Corrective action with posture/positioning 5-10x /day  3) Increased strength 4+/5 to 5/5 in back flexors and extensors  Short term goal time span:  2-4 weeks      Long Term Goals:    1) Progression/regression advancing HEP  2) Indep with postural positioning  3) Performs work duties / household ADL's with 50-75% less pain on VAS   Long term goal time span:  4-6 weeks    Plan:   Therapy options:  Physical therapy treatment to continue  Planned therapy interventions:  Functional Training, Self Care (CPT 69290), Manual Therapy (CPT 33315), Mechanical Traction (CPT 31632), Neuromuscular Re-education (CPT 85169), Self Care ADL Training (CPT 73428), Therapeutic Activities (CPT 41568) and Therapeutic Exercise (CPT 47610)  Frequency:  2x week  Duration in weeks:  6  Duration in visits:  12  Discussed with:  Patient      Functional Assessment Used        Referring provider co-signature:  I have reviewed this plan of care and my co-signature certifies the need for services.    Certification Period: 08/12/2020 to  09/23/20    Physician Signature: ________________________________ Date: ______________

## 2020-08-14 ENCOUNTER — APPOINTMENT (OUTPATIENT)
Dept: PHYSICAL THERAPY | Facility: REHABILITATION | Age: 25
End: 2020-08-14
Attending: INTERNAL MEDICINE
Payer: COMMERCIAL

## 2020-08-19 ENCOUNTER — APPOINTMENT (OUTPATIENT)
Dept: PHYSICAL THERAPY | Facility: REHABILITATION | Age: 25
End: 2020-08-19
Attending: INTERNAL MEDICINE
Payer: COMMERCIAL

## 2020-10-22 ENCOUNTER — HOSPITAL ENCOUNTER (OUTPATIENT)
Facility: MEDICAL CENTER | Age: 25
End: 2020-10-22
Attending: PHYSICIAN ASSISTANT
Payer: COMMERCIAL

## 2020-10-22 PROCEDURE — C9803 HOPD COVID-19 SPEC COLLECT: HCPCS

## 2020-10-22 PROCEDURE — U0003 INFECTIOUS AGENT DETECTION BY NUCLEIC ACID (DNA OR RNA); SEVERE ACUTE RESPIRATORY SYNDROME CORONAVIRUS 2 (SARS-COV-2) (CORONAVIRUS DISEASE [COVID-19]), AMPLIFIED PROBE TECHNIQUE, MAKING USE OF HIGH THROUGHPUT TECHNOLOGIES AS DESCRIBED BY CMS-2020-01-R: HCPCS

## 2020-10-23 LAB
COVID ORDER STATUS COVID19: NORMAL
SARS-COV-2 RNA RESP QL NAA+PROBE: DETECTED
SPECIMEN SOURCE: ABNORMAL

## 2020-11-03 ENCOUNTER — TELEMEDICINE (OUTPATIENT)
Dept: MEDICAL GROUP | Facility: PHYSICIAN GROUP | Age: 25
End: 2020-11-03
Payer: COMMERCIAL

## 2020-11-03 VITALS — TEMPERATURE: 97.9 F | HEIGHT: 59 IN | WEIGHT: 194 LBS | BODY MASS INDEX: 39.11 KG/M2

## 2020-11-03 DIAGNOSIS — U07.1 COVID-19 VIRUS INFECTION: ICD-10-CM

## 2020-11-03 DIAGNOSIS — Z11.59 SCREENING FOR VIRAL DISEASE: ICD-10-CM

## 2020-11-03 DIAGNOSIS — R56.9 SEIZURE (HCC): Chronic | ICD-10-CM

## 2020-11-03 DIAGNOSIS — E78.5 HYPERLIPIDEMIA, UNSPECIFIED HYPERLIPIDEMIA TYPE: ICD-10-CM

## 2020-11-03 PROBLEM — D72.829 LEUKOCYTOSIS: Chronic | Status: RESOLVED | Noted: 2020-07-31 | Resolved: 2020-11-03

## 2020-11-03 PROBLEM — E66.3 OVERWEIGHT: Chronic | Status: ACTIVE | Noted: 2020-07-31

## 2020-11-03 PROCEDURE — 99213 OFFICE O/P EST LOW 20 MIN: CPT | Mod: 95,CR | Performed by: INTERNAL MEDICINE

## 2020-11-03 NOTE — ASSESSMENT & PLAN NOTE
This is a new condition recently diagnosed  Results for FERNANDA JONES (MRN [id number]) as of 11/3/2020 15:51   Ref. Range 10/22/2020 20:05   COVID Order Status Unknown Received   SARS-CoV-2 by PCR Unknown DETECTED (AA)   SARS-CoV-2 Source Unknown Nasal Swab   Few days prior to the test patient reported flulike symptoms she denies any fever or chills.  No significant cough shortness of breath or wheezing abdominal pain no history of headaches change in vision rectal bleeding.  She did reported a brief episode of loss of smell but that has returned back to her normal baseline.  Patient is requesting a note to be off work.  She also requests a follow-up COVID-19 testing to be done before she can go back to work.  Currently she is asymptomatic.  Recommend for the patient to get the follow-up COVID-19 testing done around November 6 or 7, 2020 and I have written a note for the patient to go back to work temporarily on November 11, 2020 assuming the follow-up COVID-19 testing is negative.  The patient voiced understanding.

## 2020-11-03 NOTE — ASSESSMENT & PLAN NOTE
This is a chronic condition.  The patient is currently under treatment and is followed by neurology service.  No seizure activity noted.

## 2020-11-04 NOTE — PROGRESS NOTES
This visit was conducted via  Zoom Video Virtual Visit using secure and encrypted videoconferencing technology. The patient was in a private location in the state of [place].   The patient's identity was confirmed and verbal consent was obtained for this virtual visit.  -------------------------------------------------------------------------------    CC: Patient requesting a work note due to recent COVID-19 infection         HPI: This is a 25 y.o. pt.  Pt's medical history is notable for:      COVID-19 virus infection  This is a new condition recently diagnosed  Results for FERNANDA JONES (MRN [id number]) as of 11/3/2020 15:51   Ref. Range 10/22/2020 20:05   COVID Order Status Unknown Received   SARS-CoV-2 by PCR Unknown DETECTED (AA)   SARS-CoV-2 Source Unknown Nasal Swab   Few days prior to the test patient reported flulike symptoms she denies any fever or chills.  No significant cough shortness of breath or wheezing abdominal pain no history of headaches change in vision rectal bleeding.  She did reported a brief episode of loss of smell but that has returned back to her normal baseline.  Patient is requesting a note to be off work.  She also requests a follow-up COVID-19 testing to be done before she can go back to work.  Currently she is asymptomatic.  Recommend for the patient to get the follow-up COVID-19 testing done around November 6 or 7, 2020 and I have written a note for the patient to go back to work temporarily on November 11, 2020 assuming the follow-up COVID-19 testing is negative.  The patient voiced understanding.    Seizure (HCC)  This is a chronic condition.  The patient is currently under treatment and is followed by neurology service.  No seizure activity noted.    Hyperlipidemia  This was noted with recent blood test.  Patient currently on diet therapy.              REVIEW OF SYSTEMS:     Constitutional:  no fever / chills   Neurologic: no headaches, no numbness/tingling  Eyes: no changes  in vision  ENT: no sore throat, no hearing loss  CV:  no chest pain, no palpitations  Pulmonary: no SOB, no cough    GI: no nausea / vomiting, no diarrhea, no constipation  :  no dysuria, no hematuria   Skin: no rash   Hematologic: no bleeding    Allergies: Patient has no known allergies.    Current Outpatient Medications Ordered in Epic   Medication Sig Dispense Refill   • zonisamide (ZONEGRAN) 100 MG Cap Take 200mg po in AM and 100mg at bed time 90 Cap 5     No current Epic-ordered facility-administered medications on file.        Past Medical History:   Diagnosis Date   • Depression    • Epilepsy (HCC)    • Seizure (HCC)     since October        History reviewed. No pertinent surgical history.     Family History   Problem Relation Age of Onset   • Diabetes Maternal Grandmother         Social History     Tobacco Use   Smoking Status Former Smoker   • Types: Cigarettes   Smokeless Tobacco Never Used          Social History     Substance and Sexual Activity   Alcohol Use No   • Alcohol/week: 0.0 oz        ---------------------------------------------------------------------  Vitals:    11/03/20 1541   Temp: 36.6 °C (97.9 °F)       PHYSICAL EXAM:   Psych:  A&O x 3, mood and affect appropriate  Constitutional: no distress  Skin: No apparent rashes  Eye: Conjunctiva clear, no icterus  ENMT: Lips without lesions. Phonation normal.  Neck: No obvious masses visible, no thyromegaly.   Respiratory: Unlabored respiratory effort    ---------------------------------------------------------------------    ASSESSMENT:   1. COVID-19 virus infection    2. Seizure (HCC)    3. Hyperlipidemia, unspecified hyperlipidemia type    4. Screening for viral disease           MEDICAL DECISION MAKING: TODAY'S ASSESSMENT / STATUS / PLAN:    Medically the patient appeared to be stable.  She is currently asymptomatic    Pls call pt recent Covid 19 test is POSITIVE      Recommendation:    Advise routine [facility] social distancing guidelines.   symptomatic and supportive measures  Advise the patient it is important to wear a facemask.  Advise  self quarantine for 14 days.  Advise pt to be sure to get plenty of rest and increase fluid intake  Advise the patient to call our office if there is any change in her condition.  Advise for patient to go to ER if temperature more than 101 ,chills ,headaches, change in vision, motor weakness,paresthesia,chest pain ,abdominal pain ,shortness of breath , cough, rectal bleeding,  dizziness,  lightheadedness, etc...    Advised the patient to continue with her seizure medication and follow-up with neurologist          Please note that this dictation was created using voice recognition software. I have made every reasonable attempt to correct obvious errors, but it is possible there are errors of grammar and possibly content advised the patient continue with her seizure medication that I did not discover before finalizing the note.

## 2020-11-14 PROCEDURE — 99283 EMERGENCY DEPT VISIT LOW MDM: CPT

## 2020-11-15 ENCOUNTER — HOSPITAL ENCOUNTER (EMERGENCY)
Facility: MEDICAL CENTER | Age: 25
End: 2020-11-15
Attending: EMERGENCY MEDICINE
Payer: COMMERCIAL

## 2020-11-15 VITALS
HEART RATE: 116 BPM | BODY MASS INDEX: 37.91 KG/M2 | TEMPERATURE: 99.8 F | HEIGHT: 59 IN | WEIGHT: 188.05 LBS | RESPIRATION RATE: 18 BRPM | DIASTOLIC BLOOD PRESSURE: 83 MMHG | OXYGEN SATURATION: 97 % | SYSTOLIC BLOOD PRESSURE: 138 MMHG

## 2020-11-15 DIAGNOSIS — L02.91 PHLEGMON: ICD-10-CM

## 2020-11-15 DIAGNOSIS — L03.319 CELLULITIS OF TRUNK, UNSPECIFIED SITE OF TRUNK: ICD-10-CM

## 2020-11-15 PROCEDURE — A9270 NON-COVERED ITEM OR SERVICE: HCPCS | Performed by: EMERGENCY MEDICINE

## 2020-11-15 PROCEDURE — 700102 HCHG RX REV CODE 250 W/ 637 OVERRIDE(OP): Performed by: EMERGENCY MEDICINE

## 2020-11-15 RX ORDER — CEPHALEXIN 250 MG/1
500 CAPSULE ORAL ONCE
Status: COMPLETED | OUTPATIENT
Start: 2020-11-15 | End: 2020-11-15

## 2020-11-15 RX ORDER — HYDROCODONE BITARTRATE AND ACETAMINOPHEN 5; 325 MG/1; MG/1
1 TABLET ORAL ONCE
Status: COMPLETED | OUTPATIENT
Start: 2020-11-15 | End: 2020-11-15

## 2020-11-15 RX ORDER — SULFAMETHOXAZOLE AND TRIMETHOPRIM 800; 160 MG/1; MG/1
1 TABLET ORAL 2 TIMES DAILY
Qty: 10 TAB | Refills: 0 | Status: SHIPPED | OUTPATIENT
Start: 2020-11-15 | End: 2020-11-20

## 2020-11-15 RX ORDER — SULFAMETHOXAZOLE AND TRIMETHOPRIM 800; 160 MG/1; MG/1
1 TABLET ORAL ONCE
Status: COMPLETED | OUTPATIENT
Start: 2020-11-15 | End: 2020-11-15

## 2020-11-15 RX ORDER — HYDROCODONE BITARTRATE AND ACETAMINOPHEN 5; 325 MG/1; MG/1
1 TABLET ORAL EVERY 8 HOURS PRN
Qty: 9 TAB | Refills: 0 | Status: SHIPPED | OUTPATIENT
Start: 2020-11-15 | End: 2020-11-18

## 2020-11-15 RX ORDER — IBUPROFEN 600 MG/1
600 TABLET ORAL EVERY 8 HOURS PRN
Qty: 30 TAB | Refills: 0 | Status: SHIPPED | OUTPATIENT
Start: 2020-11-15 | End: 2021-02-26

## 2020-11-15 RX ORDER — CEPHALEXIN 500 MG/1
500 CAPSULE ORAL 4 TIMES DAILY
Qty: 20 CAP | Refills: 0 | Status: SHIPPED | OUTPATIENT
Start: 2020-11-15 | End: 2020-11-20

## 2020-11-15 RX ADMIN — CEPHALEXIN 500 MG: 250 CAPSULE ORAL at 01:21

## 2020-11-15 RX ADMIN — SULFAMETHOXAZOLE AND TRIMETHOPRIM 1 TABLET: 800; 160 TABLET ORAL at 01:21

## 2020-11-15 RX ADMIN — HYDROCODONE BITARTRATE AND ACETAMINOPHEN 1 TABLET: 5; 325 TABLET ORAL at 01:21

## 2020-11-15 NOTE — LETTER
Orin Rodas was seen and treated in our emergency department on 11/14/2020.  She may return to work on 11/16/2020.    If you have any questions or concerns, please don't hesitate to call.    MOE Guardado

## 2020-11-15 NOTE — LETTER
Orin Rodas was seen and treated in our emergency department on 11/14/2020.  She may return to work on 11/16/2020    If you have any questions or concerns, please don't hesitate to call.      MOE Guardado

## 2020-11-15 NOTE — ED PROVIDER NOTES
"ED Provider Note    CHIEF COMPLAINT  Chief Complaint   Patient presents with   • Abscess       HPI  Patient is a 25-year-old female with a history of epilepsy who presents emergency room for evaluation of left under arm pain.  Several days ago this started as a small bump with redness and irritation and is slowly felt like increasing in size with worsening pain and discomfort.  Currently moderate in severity, nonradiating, no active discharge that she could appreciate though she did try to poke one of the areas to drain it.  She denies fevers but endorses some subjective chills, no other acute complaints including chest pain, palpitations, shortness of breath or other recent illness.  She has no history of diabetes    PPE Note: I personally donned full PPE for all patient encounters during this visit, including being clean-shaven with an N95 respirator mask, gloves, and goggles.       REVIEW OF SYSTEMS  Positive for superficial skin redness, tenderness under the arm, chest wall discomfort but negative for fevers, clotting disorder, immunosuppression    PAST MEDICAL HISTORY   has a past medical history of Depression, Epilepsy (HCC), and Seizure (HCC).    SOCIAL HISTORY  Social History     Tobacco Use   • Smoking status: Former Smoker     Types: Cigarettes   • Smokeless tobacco: Never Used   Substance and Sexual Activity   • Alcohol use: No     Alcohol/week: 0.0 oz   • Drug use: No   • Sexual activity: Yes       SURGICAL HISTORY  patient denies any surgical history    CURRENT MEDICATIONS  Home Medications     Reviewed by Misty Thomas R.N. (Registered Nurse) on 11/15/20 at 0001  Med List Status: Complete   Medication Last Dose Status   zonisamide (ZONEGRAN) 100 MG Cap  Active                ALLERGIES  No Known Allergies    PHYSICAL EXAM  VITAL SIGNS: /83   Pulse (!) 116   Temp 37.7 °C (99.8 °F) (Temporal)   Resp 18   Ht 1.499 m (4' 11\")   Wt 85.3 kg (188 lb 0.8 oz)   SpO2 97%   BMI 37.98 kg/m²    Pulse " ox interpretation: I interpret this pulse ox as normal.  Genl: F sitting in chair comfortably, speaking clearly, appears in mild distress   Head: NC/AT  Pulmonary: Lungs are clear to auscultation bilaterally  Chest: No TTP, left underarm has a 4 cm x 3 cm area of superficial redness and induration.  There is fullness appreciated underneath this with no central clearing or area of active drainage/sinus tract  CV:   Usually tachycardic, rate of 100 during my evaluation. no murmur appreciated, pulses 2+ in both upper and lower extremities  Abdomen: soft, NT/ND; no rebound/guarding, no masses palpated, no HSM  Musculoskeletal: Pain free ROM of the neck. Moving upper and lower extremities and spontaneous in coordinated fashion  Neuro: A&Ox4 (person, place, time, situation), speech fluent, gait steady, no focal deficits appreciated  Psych: Patient has an appropriate affect and behavior  Skin: Skin changes as above.  No pallor or jaundice.  No cyanosis.  Warm and dry.     COURSE & MEDICAL DECISION MAKING  No orders to display     Pertinent Labs & Imaging studies reviewed. (See chart for details)    DDX:  Abscess, cellulitis, phlegmon, sebaceous cyst    MDM    Initial evaluation at 1225:  Patient presents emergency room for symptoms as described above.  She was nontoxic, she had initial tachycardia that on my assessment has almost completely resolved though is borderline high.  She has clear areas of cellulitis with a concern of the fullness being for development of a cellulitis.  Bedside ultrasound shows no acute fluid collection this is likely swelling/the beginning of a phlegmon and is at risk for progressing to abscess.  She will be started on polymicrobial coverage, Bactrim and Keflex.  She is given very strict return precautions, wound check will be needed at 48 hours though prompt reevaluation should occur if there is rapid progression of the swelling, she develops fevers, vomiting or other changes to her clinical  condition.  This is discussed and she verbalizes my concerns and is discharged home in stable condition.    NARCOTICS:  In prescribing controlled substances to this patient, I certify that I have obtained and reviewed the medical history of Orin Rodas. I have also made a good tory effort to obtain applicable records from other providers who have treated the patient and records did not demonstrate any increased risk of substance abuse that would prevent me from prescribing controlled substances.     I have conducted a physical exam and documented it. I have reviewed Ms. Rodas’s prescription history as maintained by the [place] Prescription Monitoring Program.     I have assessed the patient’s risk for abuse, dependency, and addiction using the validated Opioid Risk Tool available at https://www.mdcalc.com/ljotkk-cjky-kqvv-ort-narcotic-abuse.     Given the above, I believe the benefits of controlled substance therapy outweigh the risks. The reasons for prescribing controlled substances include non-narcotic, oral analgesic alternatives have been inadequate for pain control. Accordingly, I have discussed the risk and benefits, treatment plan, and alternative therapies with the patient.     FINAL IMPRESSION  Visit Diagnoses     ICD-10-CM   1. Cellulitis of trunk, unspecified site of trunk  L03.319   2. Phlegmon  L02.91     Electronically signed by: Delfino Martinez M.D., 11/15/2020 12:14 AM

## 2020-11-15 NOTE — ED TRIAGE NOTES
Non-draining L axillary abscess x 3 days, denies fever but states she feels shaky.    Denies covid symptoms and was dx about a month ago, but no new known exposure.

## 2021-02-03 ENCOUNTER — TELEMEDICINE (OUTPATIENT)
Dept: MEDICAL GROUP | Facility: PHYSICIAN GROUP | Age: 26
End: 2021-02-03
Payer: COMMERCIAL

## 2021-02-03 VITALS — BODY MASS INDEX: 38.1 KG/M2 | HEIGHT: 59 IN | WEIGHT: 189 LBS

## 2021-02-03 DIAGNOSIS — Z13.6 SCREENING FOR CARDIOVASCULAR CONDITION: ICD-10-CM

## 2021-02-03 DIAGNOSIS — L65.9 HAIR LOSS: ICD-10-CM

## 2021-02-03 DIAGNOSIS — Z13.29 SCREENING FOR ENDOCRINE DISORDER: ICD-10-CM

## 2021-02-03 DIAGNOSIS — E78.5 HYPERLIPIDEMIA, UNSPECIFIED HYPERLIPIDEMIA TYPE: Chronic | ICD-10-CM

## 2021-02-03 DIAGNOSIS — R51.9 NONINTRACTABLE HEADACHE, UNSPECIFIED CHRONICITY PATTERN, UNSPECIFIED HEADACHE TYPE: ICD-10-CM

## 2021-02-03 DIAGNOSIS — R56.9 SEIZURE (HCC): Chronic | ICD-10-CM

## 2021-02-03 PROCEDURE — 99214 OFFICE O/P EST MOD 30 MIN: CPT | Mod: 95,CR | Performed by: INTERNAL MEDICINE

## 2021-02-03 NOTE — ASSESSMENT & PLAN NOTE
This is a chronic condition.  The patient is currently followed by neurology service.  She is taking Zonegran.  No seizure activity noted.

## 2021-02-03 NOTE — ASSESSMENT & PLAN NOTE
This is a new cond  Sx noted since last few months    Locations forehead, temporal regions bilat  Mild in severity.  Sx may last upto 30min  Associated with mood swings    Pt noted symptoms seemed to occur especially after taking zonegran

## 2021-02-03 NOTE — PROGRESS NOTES
This visit was conducted via  Zoom Video Virtual Visit using secure and encrypted videoconferencing technology.   The patient's identity was confirmed and verbal consent was obtained for this virtual visit.  -------------------------------------------------------------------------------    CC: Hair loss  Recurrent headaches         HPI: This is a 25 y.o. pt.  Pt's medical history is notable for:      Seizure (HCC)  This is a chronic condition.  The patient is currently followed by neurology service.  She is taking Zonegran.  No seizure activity noted.    Hyperlipidemia  This is a chronic condition for the patient currently on diet therapy.  Lab tests ordered for follow-up.    Hair loss  This is a new cond  Noted since oct 2020 noted after starting birth control treatment injection  No recent change in shampoo or any hair care products    Headache  This is a new cond  Sx noted since last few months  Patient denies recent head trauma or injury.  Denies motor weakness paresthesia slurred speech.  Patient also denies change in vision    Locations forehead, temporal regions bilat  Mild in severity.  Sx may last upto 30min  Associated with mood swings    Pt noted symptoms seemed to occur especially after taking zonegran              REVIEW OF SYSTEMS:     Constitutional:  no fever / chills   ENT: no sore throat, no hearing loss  CV:  no chest pain, no palpitations  Pulmonary: no SOB, no cough          Allergies: Patient has no known allergies.    Current Outpatient Medications Ordered in Epic   Medication Sig Dispense Refill   • ibuprofen (MOTRIN) 600 MG Tab Take 1 Tab by mouth every 8 hours as needed for Fever, Headache or Inflammation. 30 Tab 0   • zonisamide (ZONEGRAN) 100 MG Cap Take 200mg po in AM and 100mg at bed time 90 Cap 5     No current Epic-ordered facility-administered medications on file.        Past Medical History:   Diagnosis Date   • Depression    • Epilepsy (HCC)    • Seizure (HCC)     since October         History reviewed. No pertinent surgical history.     Family History   Problem Relation Age of Onset   • Diabetes Maternal Grandmother         Social History     Tobacco Use   Smoking Status Former Smoker   • Types: Cigarettes   Smokeless Tobacco Never Used          Social History     Substance and Sexual Activity   Alcohol Use No   • Alcohol/week: 0.0 oz        ------------------------------------------------------------------      PHYSICAL EXAM:   Psych:  A&O x 3, mood and affect appropriate  Constitutional: no distress  Skin: No apparent rashes  Eye: Conjunctiva clear, no icterus  ENMT: Lips without lesions. Phonation normal.  Neck: No obvious masses visible, no thyromegaly.   Respiratory: Unlabored respiratory effort      ---------------------------------------------------------------------    ASSESSMENT:   1. Seizure (HCC)  ZONISAMIDE (ZONEGRAN)   2. Hyperlipidemia, unspecified hyperlipidemia type  ALANINE AMINO-TRANS    Lipid Profile   3. Hair loss  REFERRAL TO DERMATOLOGY   4. Screening for cardiovascular condition  Basic Metabolic Panel   5. Screening for endocrine disorder  HEMOGLOBIN A1C    TSH    MICROALBUMIN CREAT RATIO URINE   6. Nonintractable headache, unspecified chronicity pattern, unspecified headache type  CT-HEAD W/O          MEDICAL DECISION MAKING: DISCUSSION / STATUS / PLAN:    Recurrent headaches.  The exact cause is unclear needing further investigation.  The patient reported that the symptoms seem to occur after taking Zonegran  History suggestive this could be due to the possible side effects of  Zonegran patient presently taking which may include headache and mood changes.  The patient also reported difficulty with memory/concentration noted since last several months..    Brain CT scan ordered  Zonegran level requested  Also advised the patient to follow-up with neurology to evaluate if symptoms could be related to Zonegran.    Seizure.  No seizure activity noted  As above the patient  advised to follow-up with neurology to see if patient should continue with Zonegran or to consider different medication.    Hair loss.  Refer the patient to dermatology    Hyperlipidemia.  Lipid panel requested.  Recommend low-fat low-cholesterol diet.     Return in about 6 months (around 8/3/2021).       PATIENT EDUCATION:  -If any problems should arise, patient was advised to contact our office or go to ER to be evaluated.      Please note that this dictation was created using voice recognition software. I have made every reasonable attempt to correct obvious errors, but it is possible there are errors of grammar and possibly content that I did not discover before finalizing the note.

## 2021-02-03 NOTE — ASSESSMENT & PLAN NOTE
This is a new cond  Noted since oct 2020 noted after starting birth control treatment injection  No recent change in shampoo or any hair care products

## 2021-02-15 ENCOUNTER — HOSPITAL ENCOUNTER (OUTPATIENT)
Dept: RADIOLOGY | Facility: MEDICAL CENTER | Age: 26
End: 2021-02-15
Attending: INTERNAL MEDICINE
Payer: COMMERCIAL

## 2021-02-15 DIAGNOSIS — R51.9 NONINTRACTABLE HEADACHE, UNSPECIFIED CHRONICITY PATTERN, UNSPECIFIED HEADACHE TYPE: ICD-10-CM

## 2021-02-15 PROCEDURE — 70450 CT HEAD/BRAIN W/O DYE: CPT

## 2021-02-26 ENCOUNTER — OFFICE VISIT (OUTPATIENT)
Dept: NEUROLOGY | Facility: MEDICAL CENTER | Age: 26
End: 2021-02-26
Attending: STUDENT IN AN ORGANIZED HEALTH CARE EDUCATION/TRAINING PROGRAM
Payer: COMMERCIAL

## 2021-02-26 VITALS
BODY MASS INDEX: 30.31 KG/M2 | OXYGEN SATURATION: 98 % | HEART RATE: 67 BPM | HEIGHT: 59 IN | WEIGHT: 150.35 LBS | DIASTOLIC BLOOD PRESSURE: 78 MMHG | TEMPERATURE: 97.8 F | SYSTOLIC BLOOD PRESSURE: 120 MMHG

## 2021-02-26 DIAGNOSIS — R51.0 POSITIONAL HEADACHE: ICD-10-CM

## 2021-02-26 DIAGNOSIS — G93.89 CEREBRAL VENTRICULOMEGALY: ICD-10-CM

## 2021-02-26 DIAGNOSIS — H54.7 REDUCED VISUAL ACUITY: ICD-10-CM

## 2021-02-26 DIAGNOSIS — E66.9 OBESITY (BMI 30-39.9): ICD-10-CM

## 2021-02-26 DIAGNOSIS — F32.89 OTHER DEPRESSION: ICD-10-CM

## 2021-02-26 DIAGNOSIS — R56.9 SEIZURE (HCC): Chronic | ICD-10-CM

## 2021-02-26 DIAGNOSIS — Z00.00 HEALTHCARE MAINTENANCE: ICD-10-CM

## 2021-02-26 DIAGNOSIS — G08 INTRACRANIAL AND INTRASPINAL PHLEBITIS AND THROMBOPHLEBITIS: ICD-10-CM

## 2021-02-26 DIAGNOSIS — F41.9 ANXIETY: ICD-10-CM

## 2021-02-26 DIAGNOSIS — G40.804 OTHER INTRACTABLE EPILEPSY WITHOUT STATUS EPILEPTICUS (HCC): ICD-10-CM

## 2021-02-26 DIAGNOSIS — G44.89 OTHER HEADACHE SYNDROME: ICD-10-CM

## 2021-02-26 DIAGNOSIS — G93.2 INTRACRANIAL HYPERTENSION: ICD-10-CM

## 2021-02-26 PROCEDURE — 99205 OFFICE O/P NEW HI 60 MIN: CPT | Performed by: STUDENT IN AN ORGANIZED HEALTH CARE EDUCATION/TRAINING PROGRAM

## 2021-02-26 PROCEDURE — 99211 OFF/OP EST MAY X REQ PHY/QHP: CPT | Performed by: STUDENT IN AN ORGANIZED HEALTH CARE EDUCATION/TRAINING PROGRAM

## 2021-02-26 RX ORDER — FOLIC ACID 1 MG/1
1 TABLET ORAL DAILY
Qty: 90 TABLET | Refills: 3 | Status: SHIPPED | OUTPATIENT
Start: 2021-02-26 | End: 2022-02-09

## 2021-02-26 RX ORDER — CLOBAZAM 10 MG/1
10 TABLET ORAL
Qty: 30 TABLET | Refills: 0 | Status: SHIPPED | OUTPATIENT
Start: 2021-02-26 | End: 2021-03-28

## 2021-02-26 RX ORDER — VENLAFAXINE 25 MG/1
25 TABLET ORAL
Qty: 60 TABLET | Refills: 5 | Status: SHIPPED | OUTPATIENT
Start: 2021-02-26 | End: 2021-06-11

## 2021-02-26 RX ORDER — ZONISAMIDE 100 MG/1
CAPSULE ORAL
Qty: 90 CAPSULE | Refills: 5 | Status: SHIPPED | OUTPATIENT
Start: 2021-02-26 | End: 2021-09-09

## 2021-02-26 ASSESSMENT — PATIENT HEALTH QUESTIONNAIRE - PHQ9: CLINICAL INTERPRETATION OF PHQ2 SCORE: 0

## 2021-02-26 NOTE — PROGRESS NOTES
GENERAL NEUROLOGY INITIAL ENCOUNTER  REFERRING PROVIDER:  Ed Patel M.D.  [address] [place] [id number]      CHIEF COMPLAINT(S): seizures, headaches,    History of present illness:  Orin Rodas 25 y.o. female presents today for evaluation of seizures and headaches.    Thinks she may be having worsening side effects of medication (zonisamide) vs seizures. 2 months been having hard time getting words out and speaking gibberish. Everything goes fuzzy on sides of eyes.  Happening once per week. Unclear triggers. Random.    Has had headaches for years. Getting worse.  Start in back of head and migrate to front. Starts in back as a tightness and pound when it gets to front. Zonegram. They are brought on by lying down and completely relieved by sitting up. Vision is blurry with them too. Happening daily.    Sleeping is really bad. Multiple reasons: feels anxious and sad about her health, headaches, can't turn her mind off.    Seizures: 2015 started out with GTCs. Aura:. Unable to talk, becomes mute then GTC. Note this aura is the same symptoms as her current complaint above.. Since 2019 no GTCs. Between 2015 and 2019 had seizures 3 tmes per month. Once put on zonismaide they stopped.     Having Dizzy spells a lot, vertigo and lighthead once per week. Stress is trigger.    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     Been on keppra and lamictal. Didn't work.     Depressed and anxious.     Diet is not great.     Walks a lot in her job.     Patient's PMH, PSH, FH, and SH were reviewed.    Medications and allergies were reviewed.        Past medical history:   Past Medical History:   Diagnosis Date   • Depression    • Epilepsy (HCC)    • Seizure (HCC)     since October       Past surgical history:   History reviewed. No pertinent surgical history.    Family history:   Family History   Problem Relation Age of Onset   • Diabetes Maternal Grandmother        Social history:    Social History     Socioeconomic History   • Marital status: Other     Spouse name: Not on file   • Number of children: Not on file   • Years of education: Not on file   • Highest education level: Not on file   Occupational History   • Not on file   Tobacco Use   • Smoking status: Former Smoker     Types: Cigarettes   • Smokeless tobacco: Never Used   Substance and Sexual Activity   • Alcohol use: No     Alcohol/week: 0.0 oz   • Drug use: No   • Sexual activity: Yes   Other Topics Concern   • Not on file   Social History Narrative    ** Merged History Encounter **         [employer] -senior    Lives with Mom, sisters, brother             Social Determinants of Health     Financial Resource Strain:    • Difficulty of Paying Living Expenses:    Food Insecurity:    • Worried About Running Out of Food in the Last Year:    • Ran Out of Food in the Last Year:    Transportation Needs:    • Lack of Transportation (Medical):    • Lack of Transportation (Non-Medical):    Physical Activity:    • Days of Exercise per Week:    • Minutes of Exercise per Session:    Stress:    • Feeling of Stress :    Social Connections:    • Frequency of Communication with Friends and Family:    • Frequency of Social Gatherings with Friends and Family:    • Attends Orthodoxy Services:    • Active Member of Clubs or Organizations:    • Attends Club or Organization Meetings:    • Marital Status:    Intimate Partner Violence:    • Fear of Current or Ex-Partner:    • Emotionally Abused:    • Physically Abused:    • Sexually Abused:        Current medications:   Current Outpatient Medications   Medication   • clobazam (ONFI) 10 MG Tab tablet   • zonisamide (ZONEGRAN) 100 MG Cap   • folic acid (FOLVITE) 1 MG Tab   • venlafaxine (EFFEXOR) 25 MG Tab     No current facility-administered medications for this visit.       Medication Allergy:  No Known Allergies      Review of systems:   Pertinent positives and negatives are as outlined above      Physical  examination:   Vitals:    02/26/21 1107   BP: 120/78   Pulse: 67   Temp: 36.6 °C (97.8 °F)   SpO2: 98%       General: Patient in no acute distress, pleasant and cooperative.  HEENT: Normocephalic, no signs of acute trauma.   Neck: appears supple, here is normal range of motion. No tenderness on exam.   Chest: clear to auscultation. Symmetrical chest rise with inhalation. No cough.   CV: RRR, no murmurs.   Skin: no signs of acute rashes or trauma.   Musculoskeletal: joints exhibit full range of motion. There are no signs of joint or muscle swelling.   Psychiatric: pertinent positives as discussed above    NEUROLOGICAL EXAM:   Mental status:  orientation: Awake, alert and oriented to self, month, year, situation.  Attention: Intact  Speech and language: speech is clear and fluent. The patient is able to name, repeat and comprehend. No word substitions or paraphasic errors  Memory: There is intact recollection of recent and remote events.   Cranial nerve exam:   I: smell Not tested   II: visual acuity  OD: 20/20, corrected, 1 error  OS: 20/100, corrected   II: Fundoscpic Fundus poorly visualized bilaterally   II: visual fields Full to confrontation  Visual neglect: absent   II: pupils L>R poor/sluggish reactivity   III,VII: ptosis None   III,IV,VI: extraocular muscles  Mild R abducens weakness. Unable to burry sclera on R. No double vision. Otherwise intact.   V: mastication Normal   V: facial light touch sensation  Normal   V,VII: corneal reflex  Not tested   VII: facial muscle function - upper  Normal   VII: facial muscle function - lower Normal   VIII: hearing Not tested   IX: soft palate elevation  Normal   IX,X: gag reflex Not tested   XI: trapezius strength  NT   XI: sternocleidomastoid strength NT   XI: neck flexion strength  NT   XII: tongue  Protrudes Midline     Motor exam:   • Strength is 5/5 in the distal and proximal upper and lower extremities   • Tone is normal.  • No abnormal movements were seen on  exam.   • No muscle fasciculation  • No areas of atrophy  Sensory exam reveals normal sense of light touch, proprioception, vibration and pinprick in all extremities. Cortical sensory testing: Normal. Test of neglect: none present to simultaneous stimulation with touch  Deep tendon reflexes:  2+ throughout. Plantar responses are mute  .There is no clonus.   Coordination: shows a normal finger-nose-finger. Normal rapidly alternating movements. Heel-knee-shin movements smooth and coordinated bilaterally.   Gait:   • The patient was able to get up from seated position on first attempt without requiring assistance.   • Found to be steady when walking.   • Movements were fluid with normal arm swing.   • The patient was able to turn without difficulties or tendency to fall.   • Romberg exam absent        ANCILLARY DATA REVIEWED:       Lab Data Review:  Reviewed    Records reviewed:   Reviewed    Imaging:   Reviewed    EEG:  Reviewed      ASSESSMENT, PLAN, EDUCATION/COUNSELING:  Had an extensive discussion about multiple topics this visit.  She has a diagnosis of epilepsy which I suspect may be focal but we need to clarify this with updated EEG and monitoring.  Need to get an updated brain MRI both for her headaches, seizures, and concern for intracranial hypertension, either secondary or idiopathic.  Updated labs as below to evaluate for intracranial hypertension and seizures.  Discussed that her episodes of word finding difficulty might be focal seizures.  We will start empiric treatment with adding Onfi at night which should also help with her sleep.  She needs an urgent referral to neuro-ophthalmology, lumbar puncture, and an MRV to evaluate for her vision, elevated intracranial pressures, and evaluate for secondary causes of intracranial hypertension, respectively.  An extensive discussion about the importance of losing weight as a treatment for this.  I have referred her to a dietitian.  Also advised her to check her  blood pressure regularly as untreated high blood pressure can worsen this.  May consider starting her on Diamox but will wait on some of the results pending.  She should refrain from smoking as well.  She is not driving.  Starting venlafaxine to help with her anxiety and depression as well.  We need close follow-up to ensure that we are making progress with her seizures, headaches, mood state, and intracranial hypertension.  She is also switching to a progesterone only IUD per my recommendation.  She has been planning on doing this anyway.    EDUCATION AND COUNSELING:  -Education was provided to the patient and/or family regarding diagnosis and prognosis. The chronic and unpredictable nature of the condition were discussed. There is increased risk for additional events, which may carry potential for significant injuries and death. Discussed frequent seizure triggers: sleep deprivation, medication non-compliance, use of illegal drugs/alcohol, stress, and others.   -We reviewed in detail the current antiepileptic regimen. Potential side effects of antiepileptics were discussed at length, including but no limited to: hypersensitivity reactions (rash and others, some of which can be fatal), visual field changes (some of which may be irreversible), glaucoma, diplopia, kidney stones, osteopenia/osteoporosis/bone fractures, hyperthermia/anhydrosis, hyponatremia, tremors/abnormal movements, ataxia, dizziness, fatigue, increased risk for falls, risk for cardiac arrhythmias/syncope, gastrointestinal side effects(hepatitis, pancreatitis, gastritis, ulcers), gingival hypertrophy/bleeding, drowsiness, sedation, anxiety/nervousness, increased risk for suicide, increased risk for depression, and psychosis.   -We also reviewed drug-drug interactions and their potential effect on seizure control and medication side effects.    -We also discussed in detail potential effects of seizures, epilepsy, and medications during pregnancy,  including but not limited to fetal malformations, child developmental/intellectual disability, fetal/ risk for hemorrhages, stillbirth, maternal death, premature birth, and others. The patient/family aware that pregnancy should be avoided, unless desired, in which case we recommend discussing with us at least a year prior to planned conception. To avoid undesired pregnancy while on antiepileptics, we recommend dual contraception.   -Folic acid 2 mg is recommended for all females in childbearing age (12-44 years of age).   -Recommend chronic vitamin D supplementation and regular exercise (if not contraindicated).   -Patient/family educated on risk for SUDEP (Sudden Death in Epilepsy). Counseling was provided on the importance of strict medication and follow up compliance. The patient/family understand the risks associated with non-adherence with the medical plan as outlined, including but not limited to an increased risk for breakthrough seizures, which may contribute to injuries, disability, status epilepticus, and even death.   -Counseling was also provided on potential effects of alcohol and other drugs, which may lower seizure threshold and/or affect the metabolism of antiepileptic drugs. We recommend avoidance of alcohol and illegal drugs.  -Avoid sleep deprivation.   -We extensively discussed the aspects related to safety in drivers who suffer from epilepsy. The patient is encourage to report to the Division of Motor Vehicles of any condition and/or spells related to confusion, disorientation, and/or loss of awareness and/or loss of consciousness; as these may pose a safety issue if they occur while operating a motor vehicle. The patient and/or family are ultimately responsible for exercising caution and abiding to regulations in place.   -Other seizure precautions were discussed at length, including no diving, no skydiving, no climbing or exposure to unprotected heights, no unsupervised swimming, no  [place] or bathing in bathtubs or deep bodies of water. The patient/family have been advised about risks for operating any machinery while suffering from seizures / syncope / epilepsy and/or while taking antiepileptic drugs.   -The patient understands and agrees that due to the complexity of his/her diagnosis, results of any testing and further recommendations will typically be discussed/made during a face to face encounter in my office. The patient and/or family further understands it is their responsibility to keep proper follow up.     Patient/family agree with plan, as outlined.           Visit Diagnoses     ICD-10-CM   1. Healthcare maintenance  Z00.00   2. Other intractable epilepsy without status epilepticus (HCC)  G40.804   3. Seizure (HCC)  R56.9   4. Intracranial hypertension  G93.2   5. Obesity (BMI 30-39.9)  E66.9   6. Reduced visual acuity  H54.7   7. Other headache syndrome  G44.89   8. Positional headache  R51.0   9. Cerebral ventriculomegaly  G93.89   10. Anxiety  F41.9   11. Other depression  F32.89   12. Intracranial and intraspinal phlebitis and thrombophlebitis  G08        Orders Placed This Encounter   • DX-LUMBAR PUNCTURE FOR DIAGNOSIS   • MR-VENOGRAM (MRV) HEAD   • VITAMIN D,25 HYDROXY   • CBC WITH DIFFERENTIAL   • Comp Metabolic Panel   • AMMONIA   • TSH   • FREE THYROXINE   • Prothrombin Time   • APTT   • JAJA COMPREHENSIVE PANEL   • LUPUS ANTICOAGULANT   • BETA-2-MICROGLOBULIN   • ANTICARDIOLIPIN AB IGG,IGM,IGA   • REFERRAL TO NUTRITION SERVICES   • AMB REFERRAL TO NEURO OPHTHALMOLOGY   • REFERRAL TO NEURODIAGNOSTICS (EEG,EP,EMG/NCS/DBS)   • REFERRAL TO NEURODIAGNOSTICS (EEG,EP,EMG/NCS/DBS)   • clobazam (ONFI) 10 MG Tab tablet   • zonisamide (ZONEGRAN) 100 MG Cap   • folic acid (FOLVITE) 1 MG Tab   • venlafaxine (EFFEXOR) 25 MG Tab            FOLLOW-UP:   4 weeks            BILLING DOCUMENTATION:       Counseling:  I spent a total of 70 minutes of face-to-face time in this visit. Over 50% of  the time of the visit today was spent on counseling and or coordination of care wtih the patient and/or family, as above in assessment in plan.       Pablo Johansen MD  Epilepsy and General Neurology  Department of Neurology  Clinical  of Neurology [facility].

## 2021-03-02 DIAGNOSIS — R56.9 SEIZURE (HCC): ICD-10-CM

## 2021-03-02 DIAGNOSIS — G44.89 OTHER HEADACHE SYNDROME: ICD-10-CM

## 2021-03-02 DIAGNOSIS — G93.2 INTRACRANIAL HYPERTENSION: ICD-10-CM

## 2021-03-02 DIAGNOSIS — G93.89 CEREBRAL VENTRICULOMEGALY: ICD-10-CM

## 2021-03-03 ENCOUNTER — TELEPHONE (OUTPATIENT)
Dept: NEUROLOGY | Facility: MEDICAL CENTER | Age: 26
End: 2021-03-03

## 2021-03-04 ENCOUNTER — OFFICE VISIT (OUTPATIENT)
Dept: DERMATOLOGY | Facility: IMAGING CENTER | Age: 26
End: 2021-03-04
Payer: COMMERCIAL

## 2021-03-04 DIAGNOSIS — L21.9 SEBORRHEIC DERMATITIS OF SCALP: ICD-10-CM

## 2021-03-04 DIAGNOSIS — L65.9 ALOPECIA: ICD-10-CM

## 2021-03-04 PROCEDURE — 99203 OFFICE O/P NEW LOW 30 MIN: CPT | Performed by: DERMATOLOGY

## 2021-03-04 RX ORDER — CLOBETASOL PROPIONATE 0.46 MG/ML
SOLUTION TOPICAL
Qty: 50 ML | Refills: 3 | Status: SHIPPED | OUTPATIENT
Start: 2021-03-04 | End: 2021-04-09

## 2021-03-04 RX ORDER — KETOCONAZOLE 20 MG/ML
SHAMPOO TOPICAL
Qty: 120 ML | Refills: 4 | Status: SHIPPED | OUTPATIENT
Start: 2021-03-04 | End: 2021-04-09

## 2021-03-04 NOTE — PROGRESS NOTES
DERMATOLOGY NOTE  NEW VISIT       Chief complaint: Hair Loss     HPI/location: hair loss/ dandruff   Time present: 3 mths    Anything make it better or worse? got Depo shot 4-5 mths ago?  Hasn't used heat/hair treatments   Of note, had Covid19 in October however symptoms were mild.  Notes hx epilepsy and recent weight gain. Notes her epilepsy has caused more stress recently      Past Medical History:   Diagnosis Date   • Depression    • Epilepsy (HCC)    • Seizure (HCC)     since October        Family History   Problem Relation Age of Onset   • Diabetes Maternal Grandmother         Social History     Socioeconomic History   • Marital status: Other     Spouse name: Not on file   • Number of children: Not on file   • Years of education: Not on file   • Highest education level: Not on file   Occupational History   • Not on file   Tobacco Use   • Smoking status: Former Smoker     Types: Cigarettes   • Smokeless tobacco: Never Used   Substance and Sexual Activity   • Alcohol use: No     Alcohol/week: 0.0 oz   • Drug use: No   • Sexual activity: Yes   Other Topics Concern   • Not on file   Social History Narrative    ** Merged History Encounter **         [employer] -senior    Lives with Mom, sisters, brother             Social Determinants of Health     Financial Resource Strain:    • Difficulty of Paying Living Expenses:    Food Insecurity:    • Worried About Running Out of Food in the Last Year:    • Ran Out of Food in the Last Year:    Transportation Needs:    • Lack of Transportation (Medical):    • Lack of Transportation (Non-Medical):    Physical Activity:    • Days of Exercise per Week:    • Minutes of Exercise per Session:    Stress:    • Feeling of Stress :    Social Connections:    • Frequency of Communication with Friends and Family:    • Frequency of Social Gatherings with Friends and Family:    • Attends Judaism Services:    • Active Member of Clubs or Organizations:    • Attends Club or Organization  Meetings:    • Marital Status:    Intimate Partner Violence:    • Fear of Current or Ex-Partner:    • Emotionally Abused:    • Physically Abused:    • Sexually Abused:         No Known Allergies     MEDICATIONS:  Medications relevant to specialty reviewed.    Current Outpatient Medications:   •  clobetasol (TEMOVATE) 0.05 % external solution, Apply daily to affected areas of the scalp for up to 3 weeks, then stop 1 week. Repeat as needed., Disp: 50 mL, Rfl: 3  •  ketoconazole (NIZORAL) 2 % shampoo, Apply to affected area on the skin in the shower. Leave on 5-10 minutes prior to rinsing off., Disp: 120 mL, Rfl: 4  •  clobazam (ONFI) 10 MG Tab tablet, Take 1 tablet by mouth every bedtime for 30 days., Disp: 30 tablet, Rfl: 0  •  zonisamide (ZONEGRAN) 100 MG Cap, Take 200mg po in AM and 100mg at bed time, Disp: 90 capsule, Rfl: 5  •  folic acid (FOLVITE) 1 MG Tab, Take 1 tablet by mouth every day., Disp: 90 tablet, Rfl: 3  •  venlafaxine (EFFEXOR) 25 MG Tab, Take 1 tablet by mouth 2 (two) times a day., Disp: 60 tablet, Rfl: 5     REVIEW OF SYSTEMS:   Positive for skin (see HPI)  Negative for fevers, chills and cough       EXAM:  There were no vitals taken for this visit.  Constitutional: Well-developed, well-nourished, and in no distress.     A focused skin exam was performed including the affected areas of the head (including face). Notable findings on exam today listed below and/or in assessment/plan.       IMPRESSION / PLAN:    1. Seborrheic dermatitis of scalp, chronic, worsening  Exam: scalp with ill defined thin pink plaques with greasy scale  - educated patient about diagnosis, management options, and expectations of treatment  For scalp:  - start clobetasol 0.05% solution daily to affected areas of scalp until the scalp is smooth  - start ketoconazole 2% shampoo. Lather into scalp, let sit 5 minutes, then rinse. Use 3x per week.  - side effects of topical steroids discussed, including skin thinning, appearance  of stretch marks (striae), easy bruising, and telangiectasias  - if not getting good control with clobetasol, consider trial of betamethasone gel     2. Alopecia, acute  Exam: no obvious hair loss appreciated, no alopecic patches, seb derm as noted above  - Diagnosis, natural history and course and treatment options discussed in detail. Etiology is most likely multifactorial. Component of seborrheic dermatitis, likely component of telogen effluvium/stress (2/2 Covid19 infection and epilepsy), nutritional status, hormonal balance.   - Treat seb derm as above  - Recommend OTC Biotin (5 mg) one tablet daily and MVI   - Stressed the importance of a balanced diet - patient getting established with dietician  - Labs ordered today by another provider to be done at end of this month (CBC with diff, CMP, TSH, vitD) and patient will notify me once labs have been drawn  - Pending follow up/better control of seb derm, could add Rogaine if still experiencing hair loss    Return to clinic in: Return in about 3 months (around 6/4/2021). and as needed for any new or changing skin lesions.    Stephenie Baker M.D.

## 2021-03-16 ENCOUNTER — APPOINTMENT (OUTPATIENT)
Dept: HEALTH INFORMATION MANAGEMENT | Facility: MEDICAL CENTER | Age: 26
End: 2021-03-16
Payer: COMMERCIAL

## 2021-03-25 ENCOUNTER — HOSPITAL ENCOUNTER (OUTPATIENT)
Dept: RADIOLOGY | Facility: MEDICAL CENTER | Age: 26
End: 2021-03-25
Attending: STUDENT IN AN ORGANIZED HEALTH CARE EDUCATION/TRAINING PROGRAM
Payer: COMMERCIAL

## 2021-03-25 ENCOUNTER — HOSPITAL ENCOUNTER (OUTPATIENT)
Dept: LAB | Facility: MEDICAL CENTER | Age: 26
End: 2021-03-25
Attending: STUDENT IN AN ORGANIZED HEALTH CARE EDUCATION/TRAINING PROGRAM
Payer: COMMERCIAL

## 2021-03-25 ENCOUNTER — HOSPITAL ENCOUNTER (OUTPATIENT)
Facility: MEDICAL CENTER | Age: 26
End: 2021-03-25
Attending: INTERNAL MEDICINE
Payer: COMMERCIAL

## 2021-03-25 DIAGNOSIS — G93.2 INTRACRANIAL HYPERTENSION: ICD-10-CM

## 2021-03-25 DIAGNOSIS — G44.89 OTHER HEADACHE SYNDROME: ICD-10-CM

## 2021-03-25 DIAGNOSIS — R56.9 SEIZURE (HCC): Chronic | ICD-10-CM

## 2021-03-25 DIAGNOSIS — Z13.29 SCREENING FOR ENDOCRINE DISORDER: ICD-10-CM

## 2021-03-25 DIAGNOSIS — R56.9 SEIZURE (HCC): ICD-10-CM

## 2021-03-25 DIAGNOSIS — H54.7 REDUCED VISUAL ACUITY: ICD-10-CM

## 2021-03-25 DIAGNOSIS — G40.804 OTHER INTRACTABLE EPILEPSY WITHOUT STATUS EPILEPTICUS (HCC): ICD-10-CM

## 2021-03-25 DIAGNOSIS — G08 INTRACRANIAL AND INTRASPINAL PHLEBITIS AND THROMBOPHLEBITIS: ICD-10-CM

## 2021-03-25 DIAGNOSIS — E78.5 HYPERLIPIDEMIA, UNSPECIFIED HYPERLIPIDEMIA TYPE: Chronic | ICD-10-CM

## 2021-03-25 DIAGNOSIS — Z13.6 SCREENING FOR CARDIOVASCULAR CONDITION: ICD-10-CM

## 2021-03-25 DIAGNOSIS — R51.0 POSITIONAL HEADACHE: ICD-10-CM

## 2021-03-25 DIAGNOSIS — G93.89 CEREBRAL VENTRICULOMEGALY: ICD-10-CM

## 2021-03-25 DIAGNOSIS — E66.9 OBESITY (BMI 30-39.9): ICD-10-CM

## 2021-03-25 DIAGNOSIS — Z00.00 HEALTHCARE MAINTENANCE: ICD-10-CM

## 2021-03-25 LAB
ALBUMIN SERPL BCP-MCNC: 4.2 G/DL (ref 3.2–4.9)
ALBUMIN/GLOB SERPL: 1.6 G/DL
ALP SERPL-CCNC: 64 U/L (ref 30–99)
ALT SERPL-CCNC: 15 U/L (ref 2–50)
ALT SERPL-CCNC: 15 U/L (ref 2–50)
AMMONIA PLAS-SCNC: 18 UMOL/L (ref 11–45)
ANION GAP SERPL CALC-SCNC: 11 MMOL/L (ref 7–16)
ANION GAP SERPL CALC-SCNC: 12 MMOL/L (ref 7–16)
AST SERPL-CCNC: 12 U/L (ref 12–45)
BASOPHILS # BLD AUTO: 0.6 % (ref 0–1.8)
BASOPHILS # BLD: 0.04 K/UL (ref 0–0.12)
BILIRUB SERPL-MCNC: 0.2 MG/DL (ref 0.1–1.5)
BUN SERPL-MCNC: 19 MG/DL (ref 8–22)
BUN SERPL-MCNC: 19 MG/DL (ref 8–22)
BURR CELLS/RBC NFR CSF MANUAL: 0 %
CALCIUM SERPL-MCNC: 9.1 MG/DL (ref 8.4–10.2)
CALCIUM SERPL-MCNC: 9.2 MG/DL (ref 8.4–10.2)
CHLORIDE SERPL-SCNC: 109 MMOL/L (ref 96–112)
CHLORIDE SERPL-SCNC: 109 MMOL/L (ref 96–112)
CHOLEST SERPL-MCNC: 175 MG/DL (ref 100–199)
CLARITY CSF: CLEAR
CO2 SERPL-SCNC: 17 MMOL/L (ref 20–33)
CO2 SERPL-SCNC: 17 MMOL/L (ref 20–33)
COLOR CSF: COLORLESS
COLOR SPUN CSF: COLORLESS
CREAT SERPL-MCNC: 0.75 MG/DL (ref 0.5–1.4)
CREAT SERPL-MCNC: 0.77 MG/DL (ref 0.5–1.4)
CREAT UR-MCNC: 89.4 MG/DL
EOSINOPHIL # BLD AUTO: 0.13 K/UL (ref 0–0.51)
EOSINOPHIL NFR BLD: 1.8 % (ref 0–6.9)
ERYTHROCYTE [DISTWIDTH] IN BLOOD BY AUTOMATED COUNT: 42.8 FL (ref 35.9–50)
EST. AVERAGE GLUCOSE BLD GHB EST-MCNC: 114 MG/DL
FASTING STATUS PATIENT QL REPORTED: NORMAL
GLOBULIN SER CALC-MCNC: 2.7 G/DL (ref 1.9–3.5)
GLUCOSE CSF-MCNC: 60 MG/DL (ref 40–80)
GLUCOSE SERPL-MCNC: 101 MG/DL (ref 65–99)
GLUCOSE SERPL-MCNC: 99 MG/DL (ref 65–99)
GRAM STN SPEC: NORMAL
HBA1C MFR BLD: 5.6 % (ref 4–5.6)
HCT VFR BLD AUTO: 43.6 % (ref 37–47)
HDLC SERPL-MCNC: 35 MG/DL
HGB BLD-MCNC: 14.2 G/DL (ref 12–16)
IMM GRANULOCYTES # BLD AUTO: 0.02 K/UL (ref 0–0.11)
IMM GRANULOCYTES NFR BLD AUTO: 0.3 % (ref 0–0.9)
INR PPP: 0.99 (ref 0.87–1.13)
LDLC SERPL CALC-MCNC: 100 MG/DL
LYMPHOCYTES # BLD AUTO: 3.01 K/UL (ref 1–4.8)
LYMPHOCYTES NFR BLD: 42.3 % (ref 22–41)
LYMPHOCYTES NFR CSF: 80 %
MCH RBC QN AUTO: 30.9 PG (ref 27–33)
MCHC RBC AUTO-ENTMCNC: 32.6 G/DL (ref 33.6–35)
MCV RBC AUTO: 94.8 FL (ref 81.4–97.8)
MICROALBUMIN UR-MCNC: <1.2 MG/DL
MICROALBUMIN/CREAT UR: NORMAL MG/G (ref 0–30)
MONOCYTES # BLD AUTO: 0.47 K/UL (ref 0–0.85)
MONOCYTES NFR BLD AUTO: 6.6 % (ref 0–13.4)
MONONUC CELLS NFR CSF: 20 %
NEUTROPHILS # BLD AUTO: 3.45 K/UL (ref 2–7.15)
NEUTROPHILS NFR BLD: 48.4 % (ref 44–72)
NRBC # BLD AUTO: 0 K/UL
NRBC BLD-RTO: 0 /100 WBC
PLATELET # BLD AUTO: 286 K/UL (ref 164–446)
PMV BLD AUTO: 9.8 FL (ref 9–12.9)
POTASSIUM SERPL-SCNC: 4 MMOL/L (ref 3.6–5.5)
POTASSIUM SERPL-SCNC: 4 MMOL/L (ref 3.6–5.5)
PROT CSF-MCNC: 33 MG/DL (ref 15–45)
PROT SERPL-MCNC: 6.9 G/DL (ref 6–8.2)
PROTHROMBIN TIME: 12.8 SEC (ref 12–14.6)
RBC # BLD AUTO: 4.6 M/UL (ref 4.2–5.4)
RBC # CSF: <1 CELLS/UL
SIGNIFICANT IND 70042: NORMAL
SITE SITE: NORMAL
SODIUM SERPL-SCNC: 137 MMOL/L (ref 135–145)
SODIUM SERPL-SCNC: 138 MMOL/L (ref 135–145)
SOURCE SOURCE: NORMAL
SPECIMEN VOL CSF: 10.9 ML
T4 FREE SERPL-MCNC: 0.79 NG/DL (ref 0.93–1.7)
TRIGL SERPL-MCNC: 200 MG/DL (ref 0–149)
TSH SERPL DL<=0.005 MIU/L-ACNC: 1.22 UIU/ML (ref 0.38–5.33)
TSH SERPL DL<=0.005 MIU/L-ACNC: 1.22 UIU/ML (ref 0.38–5.33)
TUBE # CSF: 4
TUBE # CSF: 4
WBC # BLD AUTO: 7.1 K/UL (ref 4.8–10.8)
WBC # CSF: 1 CELLS/UL (ref 0–10)

## 2021-03-25 PROCEDURE — 36415 COLL VENOUS BLD VENIPUNCTURE: CPT

## 2021-03-25 PROCEDURE — 83036 HEMOGLOBIN GLYCOSYLATED A1C: CPT

## 2021-03-25 PROCEDURE — 306637 DX-LUMBAR PUNCTURE FOR DIAGNOSIS

## 2021-03-25 PROCEDURE — 85610 PROTHROMBIN TIME: CPT | Mod: 91

## 2021-03-25 PROCEDURE — 82784 ASSAY IGA/IGD/IGG/IGM EACH: CPT

## 2021-03-25 PROCEDURE — 84460 ALANINE AMINO (ALT) (SGPT): CPT

## 2021-03-25 PROCEDURE — 86160 COMPLEMENT ANTIGEN: CPT | Mod: 91

## 2021-03-25 PROCEDURE — 86039 ANTINUCLEAR ANTIBODIES (ANA): CPT

## 2021-03-25 PROCEDURE — 82306 VITAMIN D 25 HYDROXY: CPT

## 2021-03-25 PROCEDURE — 85025 COMPLETE CBC W/AUTO DIFF WBC: CPT

## 2021-03-25 PROCEDURE — 86431 RHEUMATOID FACTOR QUANT: CPT

## 2021-03-25 PROCEDURE — 80053 COMPREHEN METABOLIC PANEL: CPT

## 2021-03-25 PROCEDURE — 82945 GLUCOSE OTHER FLUID: CPT

## 2021-03-25 PROCEDURE — 87205 SMEAR GRAM STAIN: CPT

## 2021-03-25 PROCEDURE — 62328 DX LMBR SPI PNXR W/FLUOR/CT: CPT

## 2021-03-25 PROCEDURE — 83916 OLIGOCLONAL BANDS: CPT

## 2021-03-25 PROCEDURE — 85730 THROMBOPLASTIN TIME PARTIAL: CPT

## 2021-03-25 PROCEDURE — 84443 ASSAY THYROID STIM HORMONE: CPT | Mod: 91

## 2021-03-25 PROCEDURE — 80048 BASIC METABOLIC PNL TOTAL CA: CPT

## 2021-03-25 PROCEDURE — 80203 DRUG SCREEN QUANT ZONISAMIDE: CPT

## 2021-03-25 PROCEDURE — 84443 ASSAY THYROID STIM HORMONE: CPT

## 2021-03-25 PROCEDURE — 84439 ASSAY OF FREE THYROXINE: CPT

## 2021-03-25 PROCEDURE — 87070 CULTURE OTHR SPECIMN AEROBIC: CPT

## 2021-03-25 PROCEDURE — 70544 MR ANGIOGRAPHY HEAD W/O DYE: CPT

## 2021-03-25 PROCEDURE — 82570 ASSAY OF URINE CREATININE: CPT

## 2021-03-25 PROCEDURE — 85520 HEPARIN ASSAY: CPT

## 2021-03-25 PROCEDURE — 62270 DX LMBR SPI PNXR: CPT

## 2021-03-25 PROCEDURE — 82140 ASSAY OF AMMONIA: CPT

## 2021-03-25 PROCEDURE — 86147 CARDIOLIPIN ANTIBODY EA IG: CPT

## 2021-03-25 PROCEDURE — 82042 OTHER SOURCE ALBUMIN QUAN EA: CPT

## 2021-03-25 PROCEDURE — 82040 ASSAY OF SERUM ALBUMIN: CPT

## 2021-03-25 PROCEDURE — 80061 LIPID PANEL: CPT

## 2021-03-25 PROCEDURE — 85613 RUSSELL VIPER VENOM DILUTED: CPT

## 2021-03-25 PROCEDURE — 82232 ASSAY OF BETA-2 PROTEIN: CPT

## 2021-03-25 PROCEDURE — 82043 UR ALBUMIN QUANTITATIVE: CPT

## 2021-03-25 PROCEDURE — 89051 BODY FLUID CELL COUNT: CPT

## 2021-03-25 PROCEDURE — 86038 ANTINUCLEAR ANTIBODIES: CPT

## 2021-03-25 PROCEDURE — 84157 ASSAY OF PROTEIN OTHER: CPT

## 2021-03-25 RX ORDER — LIDOCAINE HYDROCHLORIDE 10 MG/ML
INJECTION, SOLUTION INFILTRATION; PERINEURAL
Status: DISCONTINUED
Start: 2021-03-25 | End: 2021-03-26 | Stop reason: HOSPADM

## 2021-03-25 RX ORDER — SODIUM BICARBONATE 42 MG/ML
INJECTION, SOLUTION INTRAVENOUS
Status: DISCONTINUED
Start: 2021-03-25 | End: 2021-03-26 | Stop reason: HOSPADM

## 2021-03-26 LAB
APTT PPP: 29.1 SEC (ref 24.7–36)
INR PPP: 1.02 (ref 0.87–1.13)
LA PPP-IMP: NORMAL
PROTHROMBIN TIME: 13.7 SEC (ref 12–14.6)
SCREEN DRVVT: 44 SEC (ref 28–48)
UFH PPP CHRO-ACNC: <0.1 U/ML

## 2021-03-27 LAB
B2 MICROGLOB SERPL-MCNC: 1.5 MG/L (ref 1.1–2.4)
C3 SERPL-MCNC: 132 MG/DL (ref 88–201)
C4 SERPL-MCNC: 20 MG/DL (ref 10–40)
CARDIOLIPIN IGA SER IA-ACNC: 0 APL (ref 0–11)
CARDIOLIPIN IGG SER IA-ACNC: 3 GPL (ref 0–14)
CARDIOLIPIN IGM SER IA-ACNC: 0 MPL (ref 0–12)
NUCLEAR IGG SER QL IA: DETECTED
RHEUMATOID FACT SER NEPH-ACNC: <10 IU/ML (ref 0–14)
ZONISAMIDE SERPL-MCNC: 21 UG/ML (ref 10–40)

## 2021-03-28 LAB
ANA INTERPRETIVE COMMENT Q5143: NORMAL
ANTINUCLEAR ANTIBODY (ANA), HEP-2, IGG Q5142: NORMAL
BACTERIA CSF CULT: NORMAL
GRAM STN SPEC: NORMAL
OLIGOCLONAL BANDS CSF ELPH-IMP: NORMAL
OLIGOCLONAL BANDS CSF IEF: 0 BANDS (ref 0–1)
OLIGOCLONAL BANDS.IT SER+CSF QL: NEGATIVE
SIGNIFICANT IND 70042: NORMAL
SITE SITE: NORMAL
SOURCE SOURCE: NORMAL

## 2021-03-29 ENCOUNTER — TELEPHONE (OUTPATIENT)
Dept: NEUROLOGY | Facility: MEDICAL CENTER | Age: 26
End: 2021-03-29

## 2021-03-30 LAB
25(OH)D3 SERPL-MCNC: 13 NG/ML (ref 30–100)
ALB CSF/SERPL: 7.4 RATIO (ref 0–9)
ALBUMIN CSF-MCNC: 30 MG/DL (ref 0–35)
ALBUMIN SERPL-MCNC: 4043 MG/DL (ref 3500–5200)
IGG CSF-MCNC: 4.1 MG/DL (ref 0–6)
IGG SERPL-MCNC: 1034 MG/DL (ref 768–1632)
IGG SYNTH RATE SER+CSF CALC-MRATE: <0 MG/D
IGG/ALB CLEAR SER+CSF-RTO: 0.53 RATIO (ref 0.28–0.66)
IGG/ALB CSF: 0.14 RATIO (ref 0.09–0.25)

## 2021-04-01 ENCOUNTER — TELEPHONE (OUTPATIENT)
Dept: NEUROLOGY | Facility: MEDICAL CENTER | Age: 26
End: 2021-04-01

## 2021-04-09 ENCOUNTER — OFFICE VISIT (OUTPATIENT)
Dept: NEUROLOGY | Facility: MEDICAL CENTER | Age: 26
End: 2021-04-09
Attending: STUDENT IN AN ORGANIZED HEALTH CARE EDUCATION/TRAINING PROGRAM
Payer: COMMERCIAL

## 2021-04-09 VITALS
HEIGHT: 59 IN | DIASTOLIC BLOOD PRESSURE: 70 MMHG | BODY MASS INDEX: 30.36 KG/M2 | HEART RATE: 64 BPM | TEMPERATURE: 98 F | SYSTOLIC BLOOD PRESSURE: 118 MMHG | WEIGHT: 150.57 LBS | OXYGEN SATURATION: 99 % | RESPIRATION RATE: 14 BRPM

## 2021-04-09 DIAGNOSIS — R56.9 SEIZURE (HCC): ICD-10-CM

## 2021-04-09 DIAGNOSIS — G44.89 OTHER HEADACHE SYNDROME: ICD-10-CM

## 2021-04-09 PROCEDURE — 99212 OFFICE O/P EST SF 10 MIN: CPT | Performed by: STUDENT IN AN ORGANIZED HEALTH CARE EDUCATION/TRAINING PROGRAM

## 2021-04-09 PROCEDURE — 99215 OFFICE O/P EST HI 40 MIN: CPT | Performed by: STUDENT IN AN ORGANIZED HEALTH CARE EDUCATION/TRAINING PROGRAM

## 2021-04-09 RX ORDER — CLOBAZAM 10 MG/1
10 TABLET ORAL DAILY
Qty: 90 TABLET | Refills: 0 | Status: SHIPPED | OUTPATIENT
Start: 2021-04-09 | End: 2021-06-11

## 2021-04-09 RX ORDER — CLOBAZAM 10 MG/1
10 TABLET ORAL DAILY
COMMUNITY
End: 2021-04-09 | Stop reason: SDUPTHER

## 2021-04-09 ASSESSMENT — FIBROSIS 4 INDEX: FIB4 SCORE: 0.26

## 2021-04-09 NOTE — PATIENT INSTRUCTIONS
.Neurology Clinical Patient Instructions          If imaging, procedure(s), or referrals were placed for you:  Contact [facility] scheduling if you have not been contacted about an scheduling an appointment. Call [phone]    Outpatient [facility] Imaging Sites.  • [address]  Mon-Fri 8am-5pm   • [address] (Breast Center) Mon-Fri 7am-4pm    • [address]  Mon-Fri 8am-5pm  • [facility]iology 7am-6:30pm  • [address] Mon-Fri 8am-7pm  Sat 9am-6pm   • [address]  Mon-Fri 8am-7pm and Sat/Sun 9am-5pm  • [address]  Mon-Fri 8am-5pm  • [address] (PET/CT)  Mon-Fri 8:30am-4:30pm     If labs were ordered for you:  • To Schedule an appointment for lab services, please call [phone] or visit [email or web address].  • [facility] Lab Services Convenient Locations:    [place]: • [address] (Ground Floor)  • [address] (Admitting Entrance)  • [address], [address]  • [address], [address]  • [address] [facility], [address]  • [address] [facility]  • [address] [place]: • [address]  • [address]       [place]: • [address]  • [address]     [facility]     [address] [address]     Laboratory Hours: 6:30am - 6pm  Monday - Friday,   7am - 2pm Saturday    [facility]      [address] [address]      Laboratory Hours:  7:30am - 4pm  Monday - Friday,  9am - 3pm Saturday    [facility]     [address]    [address] [address]      Laboratory Hours:  7:00am - 5:30pm  Monday - Friday    [facility]      [address] [address]       Laboratory Hours:  7:30am - 4:30pm  Monday - Friday    [facility]       [address] [place] [address]       Laboratory Hours:  8am - 5pm  Monday - Friday    [facility]        [address] [address]       Laboratory hours:  7:30am - 4pm  Monday - Friday    GENERAL REMINDERS:  · Request refills 1 week in advance to ensure you do not run out of medications  · All diagnostic study results will be reviewed at your next visit, UNLESS there are urgent results that need to be acted on sooner.  · Please remember that it is the your responsibility to check with your Insurance for benefit coverage for visit / visits.  · 24 hours notice is required for all appointment changes or cancellation.  · Please arrive 20 min. before your appointment time  · Please note that because Dr. Johansen has high daily clinic volume, he is unable to accommodate late arrivals. If you are more than 15 minutes late for the scheduled appointment you will be asked to reschedule  · Please bring the following with you:  1) Picture ID  2) Insurance card  3) An updated list of ALL your medications and their dosages (prescribed medications, over the counter medications, and all supplements), as well as allergies with you at all times  4) A list of questions, concerns, comments that you wish to discuss with Dr. Eleno Johansen MD  General Neurologist and Epileptologist  Department of Neurology  Instructor of Clinical Neurology [facility].

## 2021-04-09 NOTE — PROGRESS NOTES
GENERAL NEUROLOGY INITIAL ENCOUNTER  REFERRING PROVIDER:  Ed Patel M.D.  [address] [place] [address]      CHIEF COMPLAINT(S): seizures, headaches,    History of present illness:  Orin Rodas 25 y.o. female presents today for evaluation of seizures and headaches.    Thinks she may be having worsening side effects of medication (zonisamide) vs seizures. 2 months been having hard time getting words out and speaking gibberish. Everything goes fuzzy on sides of eyes.  Happening once per week. Unclear triggers. Random.    Has had headaches for years. Getting worse.  Start in back of head and migrate to front. Starts in back as a tightness and pound when it gets to front. Zonegram. They are brought on by lying down and completely relieved by sitting up. Vision is blurry with them too. Happening daily.    Sleeping is really bad. Multiple reasons: feels anxious and sad about her health, headaches, can't turn her mind off.    Seizures: 2015 started out with GTCs. Aura:. Unable to talk, becomes mute then GTC. Note this aura is the same symptoms as her current complaint above.. Since 2019 no GTCs. Between 2015 and 2019 had seizures 3 tmes per month. Once put on zonismaide they stopped.     Having Dizzy spells a lot, vertigo and lighthead once per week. Stress is trigger.    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     Been on keppra and lamictal. Didn't work.       Depressed and anxious.     Diet is not great.     Walks a lot in her job.     Patient's PMH, PSH, FH, and SH were reviewed.    Low vitamin D levels.    Medications and allergies were reviewed.    INTERVAL HISTORY    Headaches better    Had seizure 2 weeks ago. Laid on side and convulsed for less than a minute. Used to get them 3-4 times per months. They are less frequent now it appears but still too early to tell. Only added Onfi. Her sleep is better because of the Onfi. Both Onfi and better sleep may be  helping with seizures. Headaches better too as mentioned.     Also started venlafaxine 25 mg BID two months ago. May also be helping with the headaches and mood.    LP and CSF studies sent. Opening pressure normal. MRV normal. Unlikely IIH.    Pending EMU admission.    No papiledema.      IUD implant is now progesterone.      Past medical history:   Past Medical History:   Diagnosis Date   • Depression    • Epilepsy (HCC)    • Seizure (HCC)     since October       Past surgical history:   History reviewed. No pertinent surgical history.    Family history:   Family History   Problem Relation Age of Onset   • Diabetes Maternal Grandmother        Social history:   Social History     Socioeconomic History   • Marital status: Other     Spouse name: Not on file   • Number of children: Not on file   • Years of education: Not on file   • Highest education level: Not on file   Occupational History   • Not on file   Tobacco Use   • Smoking status: Former Smoker     Types: Cigarettes   • Smokeless tobacco: Never Used   Substance and Sexual Activity   • Alcohol use: No     Alcohol/week: 0.0 oz   • Drug use: No   • Sexual activity: Yes   Other Topics Concern   • Not on file   Social History Narrative    ** Merged History Encounter **         [employer] -senior    Lives with Mom, sisters, brother             Social Determinants of Health     Financial Resource Strain:    • Difficulty of Paying Living Expenses:    Food Insecurity:    • Worried About Running Out of Food in the Last Year:    • Ran Out of Food in the Last Year:    Transportation Needs:    • Lack of Transportation (Medical):    • Lack of Transportation (Non-Medical):    Physical Activity:    • Days of Exercise per Week:    • Minutes of Exercise per Session:    Stress:    • Feeling of Stress :    Social Connections:    • Frequency of Communication with Friends and Family:    • Frequency of Social Gatherings with Friends and Family:    • Attends [employer] Services:    •  Active Member of Clubs or Organizations:    • Attends Club or Organization Meetings:    • Marital Status:    Intimate Partner Violence:    • Fear of Current or Ex-Partner:    • Emotionally Abused:    • Physically Abused:    • Sexually Abused:        Current medications:   Current Outpatient Medications   Medication   • clobazam (ONFI) 10 MG Tab tablet   • zonisamide (ZONEGRAN) 100 MG Cap   • folic acid (FOLVITE) 1 MG Tab   • venlafaxine (EFFEXOR) 25 MG Tab     No current facility-administered medications for this visit.       Medication Allergy:  No Known Allergies      Review of systems:   Pertinent positives and negatives are as outlined above      Physical examination:   Vitals:    04/09/21 1400   BP: 118/70   Pulse: 64   Resp: 14   Temp: 36.7 °C (98 °F)   SpO2: 99%       General: Patient in no acute distress, pleasant and cooperative.  HEENT: Normocephalic, no signs of acute trauma.   Neck: appears supple, here is normal range of motion. No tenderness on exam.   Chest: clear to auscultation. Symmetrical chest rise with inhalation. No cough.   CV: RRR, no murmurs.   Skin: no signs of acute rashes or trauma.   Musculoskeletal: joints exhibit full range of motion. There are no signs of joint or muscle swelling.   Psychiatric: pertinent positives as discussed above    NEUROLOGICAL EXAM:   Mental status:  orientation: Awake, alert and oriented to self, month, year, situation.  Attention: Intact  Speech and language: speech is clear and fluent. The patient is able to name, repeat and comprehend. No word substitions or paraphasic errors  Memory: There is intact recollection of recent and remote events.   Cranial nerve exam:   I: smell Not tested   II: visual acuity  OD: 20/20, corrected, 1 error  OS: 20/100, corrected   II: Fundoscpic Fundus poorly visualized bilaterally   II: visual fields Full to confrontation  Visual neglect: absent   II: pupils L>R poor/sluggish reactivity   III,VII: ptosis None   III,IV,VI:  extraocular muscles  Mild R abducens weakness. Unable to burry sclera on R. No double vision. Otherwise intact.   V: mastication Normal   V: facial light touch sensation  Normal   V,VII: corneal reflex  Not tested   VII: facial muscle function - upper  Normal   VII: facial muscle function - lower Normal   VIII: hearing Not tested   IX: soft palate elevation  Normal   IX,X: gag reflex Not tested   XI: trapezius strength  NT   XI: sternocleidomastoid strength NT   XI: neck flexion strength  NT   XII: tongue  Protrudes Midline     Motor exam:   • Strength is 5/5 in the distal and proximal upper and lower extremities   • Tone is normal.  • No abnormal movements were seen on exam.   • No muscle fasciculation  • No areas of atrophy  Sensory exam reveals normal sense of light touch, proprioception, vibration and pinprick in all extremities. Cortical sensory testing: Normal. Test of neglect: none present to simultaneous stimulation with touch  Deep tendon reflexes:  2+ throughout. Plantar responses are mute  .There is no clonus.   Coordination: shows a normal finger-nose-finger. Normal rapidly alternating movements. Heel-knee-shin movements smooth and coordinated bilaterally.   Gait:   • The patient was able to get up from seated position on first attempt without requiring assistance.   • Found to be steady when walking.   • Movements were fluid with normal arm swing.   • The patient was able to turn without difficulties or tendency to fall.   • Romberg exam absent        ANCILLARY DATA REVIEWED:       Lab Data Review:  Reviewed    Records reviewed:   Reviewed    Imaging:   Reviewed    EEG:  Reviewed      ASSESSMENT, PLAN, EDUCATION/COUNSELING:  Had an extensive discussion about multiple topics this visit.  She has a diagnosis of epilepsy which I suspect may be focal but we need to clarify this with updated EEG and EMU monitoring. No changes to meds right now as she seems to have decreasing seizure frequency, improving mood  and headaches with some of the changes made last visit. Likely that improvements in mood, headaches, and seizures seen are a combination of improved sleep and medications. C/w Onfi 10 QHS and zonisamide 200/100. Patient now taking progesterone-only IUD. Continue with venlafaxine 25 mg BID. IIH likely unlikely given normal opening pressure.        EDUCATION AND COUNSELING:  -Education was provided to the patient and/or family regarding diagnosis and prognosis. The chronic and unpredictable nature of the condition were discussed. There is increased risk for additional events, which may carry potential for significant injuries and death. Discussed frequent seizure triggers: sleep deprivation, medication non-compliance, use of illegal drugs/alcohol, stress, and others.   -We reviewed in detail the current antiepileptic regimen. Potential side effects of antiepileptics were discussed at length, including but no limited to: hypersensitivity reactions (rash and others, some of which can be fatal), visual field changes (some of which may be irreversible), glaucoma, diplopia, kidney stones, osteopenia/osteoporosis/bone fractures, hyperthermia/anhydrosis, hyponatremia, tremors/abnormal movements, ataxia, dizziness, fatigue, increased risk for falls, risk for cardiac arrhythmias/syncope, gastrointestinal side effects(hepatitis, pancreatitis, gastritis, ulcers), gingival hypertrophy/bleeding, drowsiness, sedation, anxiety/nervousness, increased risk for suicide, increased risk for depression, and psychosis.   -We also reviewed drug-drug interactions and their potential effect on seizure control and medication side effects.    -We also discussed in detail potential effects of seizures, epilepsy, and medications during pregnancy, including but not limited to fetal malformations, child developmental/intellectual disability, fetal/ risk for hemorrhages, stillbirth, maternal death, premature birth, and others. The  patient/family aware that pregnancy should be avoided, unless desired, in which case we recommend discussing with us at least a year prior to planned conception. To avoid undesired pregnancy while on antiepileptics, we recommend dual contraception.   -Folic acid 2 mg is recommended for all females in childbearing age (12-44 years of age).   -Recommend chronic vitamin D supplementation and regular exercise (if not contraindicated).   -Patient/family educated on risk for SUDEP (Sudden Death in Epilepsy). Counseling was provided on the importance of strict medication and follow up compliance. The patient/family understand the risks associated with non-adherence with the medical plan as outlined, including but not limited to an increased risk for breakthrough seizures, which may contribute to injuries, disability, status epilepticus, and even death.   -Counseling was also provided on potential effects of alcohol and other drugs, which may lower seizure threshold and/or affect the metabolism of antiepileptic drugs. We recommend avoidance of alcohol and illegal drugs.  -Avoid sleep deprivation.   -We extensively discussed the aspects related to safety in drivers who suffer from epilepsy. The patient is encourage to report to the Division of Motor Vehicles of any condition and/or spells related to confusion, disorientation, and/or loss of awareness and/or loss of consciousness; as these may pose a safety issue if they occur while operating a motor vehicle. The patient and/or family are ultimately responsible for exercising caution and abiding to regulations in place.   -Other seizure precautions were discussed at length, including no diving, no skydiving, no climbing or exposure to unprotected heights, no unsupervised swimming, no Jacuzzi or bathing in bathtubs or deep bodies of water. The patient/family have been advised about risks for operating any machinery while suffering from seizures / syncope / epilepsy and/or  while taking antiepileptic drugs.   -The patient understands and agrees that due to the complexity of his/her diagnosis, results of any testing and further recommendations will typically be discussed/made during a face to face encounter in my office. The patient and/or family further understands it is their responsibility to keep proper follow up.     Patient/family agree with plan, as outlined.           Visit Diagnoses     ICD-10-CM   1. Seizure (HCC)  R56.9   2. Other headache syndrome  G44.89        Orders Placed This Encounter   • DISCONTD: clobazam (ONFI) 10 MG Tab tablet   • clobazam (ONFI) 10 MG Tab tablet            FOLLOW-UP:   6-8 weeks            BILLING DOCUMENTATION:       Counseling:  I spent a total of 41 minutes of face-to-face time in this visit. Over 50% of the time of the visit today was spent on counseling and or coordination of care wtih the patient and/or family, as above in assessment in plan.       Pablo Johansen MD  Epilepsy and General Neurology  Department of Neurology  Clinical  of Neurology [facility] [employer].

## 2021-04-27 ENCOUNTER — TELEPHONE (OUTPATIENT)
Dept: DERMATOLOGY | Facility: IMAGING CENTER | Age: 26
End: 2021-04-27

## 2021-04-27 RX ORDER — CLOBETASOL PROPIONATE 0.46 MG/ML
SOLUTION TOPICAL
Qty: 60 ML | Refills: 3 | Status: SHIPPED | OUTPATIENT
Start: 2021-04-27 | End: 2022-08-17

## 2021-04-27 RX ORDER — KETOCONAZOLE 20 MG/ML
SHAMPOO TOPICAL
Qty: 120 ML | Refills: 6 | Status: SHIPPED | OUTPATIENT
Start: 2021-04-27 | End: 2022-04-01

## 2021-04-27 NOTE — TELEPHONE ENCOUNTER
It seems like the medications were discontinued, can you please send clobetasol 0.05% solution and ketoconazole 2% shampoo to the pharmacy?           ----- Message from Maria Ines Nolan sent at 4/27/2021  1:28 PM PDT -----  Patient was seen by Dr. Baker and says she was supposed to get a shampoo prescribed. However her pharmacy said they have not received the Rx for that. Can we please follow up on that? She verified the pharmacy on file was correct. [facility] [address].

## 2021-05-30 ENCOUNTER — OFFICE VISIT (OUTPATIENT)
Dept: URGENT CARE | Facility: CLINIC | Age: 26
End: 2021-05-30
Payer: COMMERCIAL

## 2021-05-30 VITALS
OXYGEN SATURATION: 98 % | SYSTOLIC BLOOD PRESSURE: 110 MMHG | DIASTOLIC BLOOD PRESSURE: 70 MMHG | WEIGHT: 198.6 LBS | RESPIRATION RATE: 16 BRPM | TEMPERATURE: 97.5 F | BODY MASS INDEX: 40.04 KG/M2 | HEIGHT: 59 IN | HEART RATE: 66 BPM

## 2021-05-30 DIAGNOSIS — J02.9 SORE THROAT: ICD-10-CM

## 2021-05-30 LAB
INT CON NEG: NEGATIVE
INT CON POS: POSITIVE
S PYO AG THROAT QL: NEGATIVE

## 2021-05-30 PROCEDURE — 87880 STREP A ASSAY W/OPTIC: CPT | Performed by: PHYSICIAN ASSISTANT

## 2021-05-30 PROCEDURE — 99213 OFFICE O/P EST LOW 20 MIN: CPT | Performed by: PHYSICIAN ASSISTANT

## 2021-05-30 ASSESSMENT — ENCOUNTER SYMPTOMS
SHORTNESS OF BREATH: 0
EYE REDNESS: 0
EYE DISCHARGE: 0
SPUTUM PRODUCTION: 0
WHEEZING: 0
PALPITATIONS: 0
EYE PAIN: 0
COUGH: 0
FEVER: 0
STRIDOR: 0
HEADACHES: 0
CHILLS: 0
HEMOPTYSIS: 0
SORE THROAT: 1

## 2021-05-30 ASSESSMENT — PAIN SCALES - GENERAL: PAINLEVEL: NO PAIN

## 2021-05-30 ASSESSMENT — FIBROSIS 4 INDEX: FIB4 SCORE: 0.26

## 2021-05-30 NOTE — PROGRESS NOTES
Subjective:   Orin Rodas is a 25 y.o. female who presents for Sore Throat (hard to swallow x12 hours)      HPI  Pt is a 25 yr old female who presents for evaluation of a sore throat.  Pt states having symptoms for 1 days with other URI symptoms.  Pt denies red flag symptoms of stiff neck or unable to handle oral secretions. Pt is up to date with vaccinations.     Review of Systems   Constitutional: Positive for malaise/fatigue. Negative for chills and fever.   HENT: Positive for congestion, ear pain and sore throat. Negative for ear discharge.    Eyes: Negative for pain, discharge and redness.   Respiratory: Negative for cough, hemoptysis, sputum production, shortness of breath, wheezing and stridor.    Cardiovascular: Negative for chest pain and palpitations.   Skin: Negative for itching and rash.   Neurological: Negative for headaches.   All other systems reviewed and are negative.      Medications:    • clobazam Tabs  • clobetasol  • folic acid Tabs  • ketoconazole  • venlafaxine Tabs  • zonisamide Caps    Allergies: Patient has no known allergies.    Problem List: Orin Rodas does not have any pertinent problems on file.    Surgical History:  No past surgical history on file.    Past Social Hx: Orin Rodas  reports that she has quit smoking. Her smoking use included cigarettes. She has never used smokeless tobacco. She reports that she does not drink alcohol and does not use drugs.     Past Family Hx:  Orin Rodas family history includes Diabetes in her maternal grandmother.     Problem list, medications, and allergies reviewed by myself today in Epic.     Objective:     There were no vitals taken for this visit.    Physical Exam  Vitals reviewed.   Constitutional:       General: She is not in acute distress.     Appearance: She is well-developed. She is not ill-appearing or toxic-appearing.      Interventions: She is not intubated.  HENT:      Head: Normocephalic and atraumatic.       Right Ear: Hearing, tympanic membrane, ear canal and external ear normal.      Left Ear: Hearing, tympanic membrane, ear canal and external ear normal.      Nose: Nose normal.      Mouth/Throat:      Pharynx: Uvula midline.   Eyes:      General: Lids are normal.      Conjunctiva/sclera: Conjunctivae normal.   Cardiovascular:      Rate and Rhythm: Regular rhythm.      Heart sounds: Normal heart sounds, S1 normal and S2 normal. No murmur heard.   No friction rub. No gallop.    Pulmonary:      Effort: Pulmonary effort is normal. No tachypnea, bradypnea, accessory muscle usage or respiratory distress. She is not intubated.      Breath sounds: Normal breath sounds. No decreased breath sounds, wheezing, rhonchi or rales.   Chest:      Chest wall: No tenderness.   Musculoskeletal:         General: Normal range of motion.      Cervical back: Full passive range of motion without pain, normal range of motion and neck supple.   Skin:     General: Skin is warm and dry.   Neurological:      Mental Status: She is alert and oriented to person, place, and time.   Psychiatric:         Speech: Speech normal.         Behavior: Behavior normal.         Thought Content: Thought content normal.         Judgment: Judgment normal.       Rapid Strep: NEG    Assessment/Plan:     Medical Decision Making/Comments      Pt is a 25 yr old female who presents for evaluation of a sore throat.  Pt states having symptoms for 1 days with other URI symptoms.  Pt denies red flag symptoms of stiff neck or unable to handle oral secretions. Pt is up to date with vaccinations.  Vital signs normal.  Pt appears well and non-toxic.  Exam shows erythema of the posterior pharynx with clear airway.  Pt has soft and supple neck with full ROM.  There is no tender cervical lymphadenopathy, muffled voice, trismus, posterior oral pharyngeal swelling or uvula deviation. Based from history, symptoms, and exam epiglottitis, retropharyngeal abscess, peritonsillar  abscess, diphtheria are unlikely.  With a rapid strep test negative and a Centor criteria of less than four this likely represents a viral etiology.      Diagnosis differential includes but not limited to: bacterial pharyngitis, viral pharyngitis, stomatitis, candida albicans.           Diagnosis and associated orders     1. Sore throat  POCT Rapid Strep A     - Warm salt water gargles  - NSAIDs/Acetamenopohen PRN  - Throat lozenges PRN  - Cool mist humidifier  - Increase hydration/solid diet as tolerated             Differential diagnosis, natural history, supportive care, and indications for immediate follow-up discussed.    Advised the patient to follow-up with the primary care physician for recheck, reevaluation, and consideration of further management.    Please note that this dictation was created using voice recognition software. I have made a reasonable attempt to correct obvious errors, but I expect that there are errors of grammar and possibly content that I did not discover before finalizing the note.

## 2021-06-11 ENCOUNTER — OFFICE VISIT (OUTPATIENT)
Dept: NEUROLOGY | Facility: MEDICAL CENTER | Age: 26
End: 2021-06-11
Attending: STUDENT IN AN ORGANIZED HEALTH CARE EDUCATION/TRAINING PROGRAM
Payer: COMMERCIAL

## 2021-06-11 VITALS
RESPIRATION RATE: 16 BRPM | OXYGEN SATURATION: 97 % | SYSTOLIC BLOOD PRESSURE: 122 MMHG | HEIGHT: 59 IN | WEIGHT: 199.08 LBS | BODY MASS INDEX: 40.13 KG/M2 | HEART RATE: 68 BPM | DIASTOLIC BLOOD PRESSURE: 64 MMHG | TEMPERATURE: 97.6 F

## 2021-06-11 DIAGNOSIS — G40.804 OTHER INTRACTABLE EPILEPSY WITHOUT STATUS EPILEPTICUS (HCC): ICD-10-CM

## 2021-06-11 DIAGNOSIS — R51.0 POSITIONAL HEADACHE: ICD-10-CM

## 2021-06-11 DIAGNOSIS — F32.89 OTHER DEPRESSION: ICD-10-CM

## 2021-06-11 DIAGNOSIS — F41.9 ANXIETY: ICD-10-CM

## 2021-06-11 DIAGNOSIS — R79.89 LOW VITAMIN D LEVEL: ICD-10-CM

## 2021-06-11 DIAGNOSIS — K13.79 MOUTH PAIN: ICD-10-CM

## 2021-06-11 DIAGNOSIS — R56.9 SEIZURE (HCC): ICD-10-CM

## 2021-06-11 DIAGNOSIS — E66.3 OVERWEIGHT: ICD-10-CM

## 2021-06-11 DIAGNOSIS — G44.89 OTHER HEADACHE SYNDROME: ICD-10-CM

## 2021-06-11 PROCEDURE — 99215 OFFICE O/P EST HI 40 MIN: CPT | Performed by: STUDENT IN AN ORGANIZED HEALTH CARE EDUCATION/TRAINING PROGRAM

## 2021-06-11 PROCEDURE — 99417 PROLNG OP E/M EACH 15 MIN: CPT | Performed by: STUDENT IN AN ORGANIZED HEALTH CARE EDUCATION/TRAINING PROGRAM

## 2021-06-11 PROCEDURE — 99212 OFFICE O/P EST SF 10 MIN: CPT | Performed by: STUDENT IN AN ORGANIZED HEALTH CARE EDUCATION/TRAINING PROGRAM

## 2021-06-11 RX ORDER — CLOBAZAM 10 MG/1
20 TABLET ORAL
Qty: 180 TABLET | Refills: 0 | Status: SHIPPED | OUTPATIENT
Start: 2021-06-11 | End: 2021-09-09

## 2021-06-11 RX ORDER — CHOLECALCIFEROL (VITAMIN D3) 125 MCG
5000 CAPSULE ORAL DAILY
Qty: 90 CAPSULE | Refills: 3 | Status: SHIPPED | OUTPATIENT
Start: 2021-06-11 | End: 2022-06-06

## 2021-06-11 RX ORDER — VENLAFAXINE 50 MG/1
50 TABLET ORAL 3 TIMES DAILY
Qty: 90 TABLET | Refills: 3 | Status: SHIPPED | OUTPATIENT
Start: 2021-06-11 | End: 2021-11-08

## 2021-06-11 ASSESSMENT — FIBROSIS 4 INDEX: FIB4 SCORE: 0.26

## 2021-06-11 NOTE — PROGRESS NOTES
GENERAL NEUROLOGY INITIAL ENCOUNTER  REFERRING PROVIDER:  Ed Patel M.D.  [address] [place] [address]      CHIEF COMPLAINT(S): seizures, headaches,    History of present illness:  Orin Rodas 25 y.o. female presents today for evaluation of seizures and headaches.    Thinks she may be having worsening side effects of medication (zonisamide) vs seizures. 2 months been having hard time getting words out and speaking gibberish. Everything goes fuzzy on sides of eyes.  Happening once per week. Unclear triggers. Random.    Has had headaches for years. Getting worse.  Start in back of head and migrate to front. Starts in back as a tightness and pound when it gets to front. Zonegram. They are brought on by lying down and completely relieved by sitting up. Vision is blurry with them too. Happening daily. Has only tried ibuprofen    Sleeping is really bad. Multiple reasons: feels anxious and sad about her health, headaches, can't turn her mind off.    Seizures: 2015 started out with GTCs. Aura:. Unable to talk, becomes mute then GTC. Note this aura is the same symptoms as her current complaint above.. Since 2019 no GTCs. Between 2015 and 2019 had seizures 3 tmes per month. Once put on zonismaide they stopped.     Having Dizzy spells a lot, vertigo and lighthead once per week. Stress is trigger.    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     Been on keppra and lamictal. Didn't work.       Depressed and anxious.     Diet is not great.     Walks a lot in her job.     Patient's PMH, PSH, FH, and SH were reviewed.    Low vitamin D levels.    Medications and allergies were reviewed.    INTERVAL HISTORY 2/26/21  Headaches better  Had seizure 2 weeks ago. Laid on side and convulsed for less than a minute. Used to get them 3-4 times per months. They are less frequent now it appears but still too early to tell. Only added Onfi. Her sleep is better because of the Onfi. Both Onfi  and better sleep may be helping with seizures. Headaches better too as mentioned.   Also started venlafaxine 25 mg BID two months ago. May also be helping with the headaches and mood.  LP and CSF studies sent. Opening pressure normal. MRV normal. Unlikely IIH.  Pending EMU admission.  No papiledema.    IUD implant is now progesterone.    INTERVAL HISTORY:  • 3 GTCs in 24 hours. Each lasted a couple of minutes with post-ictal confusion, aphasia. Aura of being Unable to talk as previously described then goes into GTC  • Insomnia. Sleep 4-5 hours per night the past 2 weeks because of work stress  • Headaches  -every other day. Previously they were daily  • Anxious and depressed about work stressors and the seizures  • She bit the side of her tongue  • No issues with compliance. Onfi 10 mg initially helped with the sleep but became less effective more recently. Still on Zonisamide   • Labs and imaging and procedures reviewed. Low vitamin D levels. LP had a normal opening pressure. Patient denied any improvement or worsening of headache symptoms after LP. MRV normal. CSF WNL.  • Reports getting  viral infection that started last week which she is still getting over  • Taking folic acid  •           Past medical history:   Past Medical History:   Diagnosis Date   • Depression    • Epilepsy (HCC)    • Seizure (HCC)     since October       Past surgical history:   History reviewed. No pertinent surgical history.    Family history:   Family History   Problem Relation Age of Onset   • Diabetes Maternal Grandmother        Social history:   Social History     Socioeconomic History   • Marital status: Other     Spouse name: Not on file   • Number of children: Not on file   • Years of education: Not on file   • Highest education level: Not on file   Occupational History   • Not on file   Tobacco Use   • Smoking status: Former Smoker     Types: Cigarettes   • Smokeless tobacco: Never Used   Vaping Use   • Vaping Use: Some days   •  Substances: Flavoring   Substance and Sexual Activity   • Alcohol use: No     Alcohol/week: 0.0 oz   • Drug use: No   • Sexual activity: Yes   Other Topics Concern   • Not on file   Social History Narrative    ** Merged History Encounter **         [employer] -senior    Lives with Mom, sisters, brother             Social Determinants of Health     Financial Resource Strain:    • Difficulty of Paying Living Expenses:    Food Insecurity:    • Worried About Running Out of Food in the Last Year:    • Ran Out of Food in the Last Year:    Transportation Needs:    • Lack of Transportation (Medical):    • Lack of Transportation (Non-Medical):    Physical Activity:    • Days of Exercise per Week:    • Minutes of Exercise per Session:    Stress:    • Feeling of Stress :    Social Connections:    • Frequency of Communication with Friends and Family:    • Frequency of Social Gatherings with Friends and Family:    • Attends [employer] Services:    • Active Member of Clubs or Organizations:    • Attends Club or Organization Meetings:    • Marital Status:    Intimate Partner Violence:    • Fear of Current or Ex-Partner:    • Emotionally Abused:    • Physically Abused:    • Sexually Abused:        Current medications:   Current Outpatient Medications   Medication   • Cholecalciferol (VITAMIN D) 125 MCG (5000 UT) Cap   • venlafaxine (EFFEXOR) 50 MG tablet   • clobazam (ONFI) 10 MG Tab tablet   • Midazolam 5 MG/0.1ML Solution   • benzocaine-menthol (CEPACOL) 15-3.6 MG Lozenge   • clobetasol (TEMOVATE) 0.05 % external solution   • ketoconazole (NIZORAL) 2 % shampoo   • zonisamide (ZONEGRAN) 100 MG Cap   • folic acid (FOLVITE) 1 MG Tab     No current facility-administered medications for this visit.       Medication Allergy:  No Known Allergies      Review of systems:   Pertinent positives and negatives are as outlined above      Physical examination:   Vitals:    06/11/21 1541   BP: 122/64   Pulse: 68   Resp: 16   Temp: 36.4 °C (97.6 °F)    SpO2: 97%       General: Patient in no acute distress, pleasant and cooperative.  HEENT: fresh open wound on the right lateral tongue from biting tongue during seizure  Neck: appears supple, here is normal range of motion. No tenderness on exam.   Chest: clear to auscultation. Symmetrical chest rise with inhalation. No cough.   CV: RRR, no murmurs.   Skin: no signs of acute rashes or trauma.   Musculoskeletal: joints exhibit full range of motion. There are no signs of joint or muscle swelling.   Psychiatric: pertinent positives as discussed above    NEUROLOGICAL EXAM:   Mental status:  orientation: Awake, alert and oriented to self, month, year, situation.  Attention: Intact  Speech and language: speech is clear and fluent. The patient is able to name, repeat and comprehend. No word substitions or paraphasic errors  Memory: There is intact recollection of recent and remote events.   Cranial nerve exam:   I: smell Not tested   II: visual acuity  OD: 20/20, corrected, 1 error  OS: 20/100, corrected   II: Fundoscpic Fundus poorly visualized bilaterally   II: visual fields Full to confrontation  Visual neglect: absent   II: pupils L>R poor/sluggish reactivity   III,VII: ptosis None   III,IV,VI: extraocular muscles  Mild R abducens weakness. Unable to burry sclera on R. No double vision. Otherwise intact.   V: mastication Normal   V: facial light touch sensation  Normal   V,VII: corneal reflex  Not tested   VII: facial muscle function - upper  Normal   VII: facial muscle function - lower Normal   VIII: hearing Not tested   IX: soft palate elevation  Normal   IX,X: gag reflex Not tested   XI: trapezius strength  NT   XI: sternocleidomastoid strength NT   XI: neck flexion strength  NT   XII: tongue  Protrudes Midline     Motor exam:   • Strength is 5/5 in the distal and proximal upper and lower extremities   • Tone is normal.  • No abnormal movements were seen on exam.   • No muscle fasciculation  • No areas of  atrophy  Sensory exam reveals normal sense of light touch, proprioception, vibration and pinprick in all extremities. Cortical sensory testing: Normal. Test of neglect: none present to simultaneous stimulation with touch  Deep tendon reflexes:  2+ throughout. Plantar responses are mute  .There is no clonus.   Coordination: shows a normal finger-nose-finger. Normal rapidly alternating movements. Heel-knee-shin movements smooth and coordinated bilaterally.   Gait:   • The patient was able to get up from seated position on first attempt without requiring assistance.   • Found to be steady when walking.   • Movements were fluid with normal arm swing.   • The patient was able to turn without difficulties or tendency to fall.   • Romberg exam absent        ANCILLARY DATA REVIEWED:       Lab Data Review:  Reviewed    Records reviewed:   Reviewed    Imaging:   Reviewed    EEG:  Reviewed      ASSESSMENT, PLAN, EDUCATION/COUNSELING:  Had an extensive discussion about her seizures, mood, and insomnia, and stress. I suspect she had a seizure due to a multiple factors: insomnia, excessive stress, viral illness, worsening of underlying anxiety and depression.     PLAN:  · Seizures/Epilepsy:   · c/w zonisamde 200/100 unchanged.   · Increase Onfi to 20 mg QHS.  ·  Adding rescue nasal midazolam 5 mg as needed for any prolonged seizure > 2 minutes OR repetitive seizures of any length OR if she is not back to her cognitive baseline after any seizure  · Patient to continue with progesterone-only IUD.   · Pending EMU admission    · Insomnia, likely secondary to anxiety and stress  · Manage stress and mood symptoms - will effexor 50 mg TID    · Stress/Depression/Anxiety  · Increasing effexor as mentioned  · Address insomnia - expect that increasing Onfi to 20 mg QHS will help as well    · Primary headache disorder with migrainous features  · Increasing effexor as above  · addressing sleep and insomnia as above  · Will consider nurtec for  abortive treatment  · IIH likely unlikely given normal opening pressure, normal MRV    · Vitamin D decieincy  · Vitamin D 5000 U daily for 3 weeks, then continue at 2000 U thereafter  · Recheck Vitamin D levels in August at EMU admission    · Tongue laceration from seizure  · NSAIDS prn  · Benzocaine-menthol losenger script ordered, printed, and faxed to patient's pharmacy            EPILEPSY EDUCATION AND COUNSELING:  [x] Education was provided to patient and/or family regarding diagnosis and prognosis. The chronic and unpredictable nature of the condition was discussed. There is increased risk for additional events, which may carry potential for significant injuries and death.   [x] Discussed frequent seizure triggers: sleep deprivation, medication non-compliance, use of illegal drugs/alcohol, stress, and others.   [x] We reviewed in detail the current antiepileptic regimen. Potential side effects of antiepileptics were discussed at length, including but no limited to:   • hypersensitivity reactions (rash and others, some of which can be fatal),   • visual field changes (some of which may be irreversible),   • glaucoma,   • diplopia,   • kidney stones,   • osteopenia/osteoporosis/bone fractures,   • hyperthermia/anhydrosis,   • hyponatremia,   • tremors/abnormal movements,   • ataxia,   • dizziness,   • fatigue,   • increased risk for falls,   • risk for cardiac arrhythmias/syncope,   • gastrointestinal side effects(hepatitis, pancreatitis, gastritis, ulcers),   • gingival hypertrophy/bleeding,   • Drowsiness/sedation,   • anxiety/nervousness, irritability, restlessness  • Other psychiatric side effects: increased risk for suicide, increased risk for depression  • Potential for causing psychosis.   [x] Reviewed drug-drug interactions and their potential effect on seizure control   [x] Discussed potential effects of seizures, epilepsy, and medications during pregnancy, including but not limited to fetal  malformations, child developmental/intellectual disability, fetal/ risk for hemorrhages, stillbirth, maternal death, premature birth, and others. The patient/family aware that pregnancy should be avoided, unless desired, in which case we recommend discussing with us at least a year prior to planned conception. To avoid undesired pregnancy while on antiepileptics, we recommend dual contraception.   [x] Folic acid 2 mg is recommended for all females in childbearing age (12-44 years of age).   [x] Recommend chronic vitamin D supplementation   [x] Recommend regular exercise (if not contraindicated).   [x] Educated on risk for SUDEP (Sudden Death in Epilepsy). Counseling was provided on the importance of strict medication and follow up compliance. The patient/family understand the risks associated with non-adherence with the medical plan as outlined, including but not limited to an increased risk for breakthrough seizures, which may contribute to injuries, disability, status epilepticus, and even death.   [x] Counseling was also provided on potential effects of alcohol and other drugs, which may lower seizure threshold and/or affect the metabolism of antiepileptic drugs. We recommend avoidance of alcohol and illegal drugs.  [x] Discussed aspects related to safety in drivers who suffer from epilepsy. The patient is encourage to report to the Division of Motor Vehicles of any condition and/or spells related to confusion, disorientation, and/or loss of awareness and/or loss of consciousness; as these may pose a safety issue if they occur while operating a motor vehicle. The patient and/or family are ultimately responsible for exercising caution and abiding to regulations in place.   [x] Other seizure precautions were discussed at length, including  no skydiving, no climbing or exposure to unprotected heights, no unsupervised swimming, no Jacuzzi or bathing in bathtubs or deep bodies of water.   [x]Counseled about  risks for operating any machinery while suffering from seizures / syncope / epilepsy and/or while taking antiepileptic drugs.   [x] Counseled that due to the complexity of his/her diagnosis, results of any testing and further recommendations will typically be discussed/made during a face to face encounter in my office.   [x] Discussed that patient and/or family further understands it is their responsibility to keep proper follow up.     Patient/family agree with plan, as outlined.           Visit Diagnoses     ICD-10-CM   1. Low vitamin D level  R79.89   2. Anxiety  F41.9   3. Other depression  F32.89   4. Seizure (HCC)  R56.9   5. Positional headache  R51.0   6. Overweight  E66.3   7. Mouth pain  K13.79   8. Other headache syndrome  G44.89   9. Other intractable epilepsy without status epilepticus (HCC)  G40.804        Orders Placed This Encounter   • Cholecalciferol (VITAMIN D) 125 MCG (5000 UT) Cap   • venlafaxine (EFFEXOR) 50 MG tablet   • clobazam (ONFI) 10 MG Tab tablet   • Midazolam 5 MG/0.1ML Solution   • benzocaine-menthol (CEPACOL) 15-3.6 MG Lozenge            FOLLOW-UP:   6-8 weeks            BILLING DOCUMENTATION:       Counseling:  I spent a total of 65 minutes of face-to-face time in this visit. Over 50% of the time of the visit today was spent on counseling and or coordination of care wtih the patient and/or family, as above in assessment in plan.       Pablo Johansen MD  Epilepsy and General Neurology  Department of Neurology  Clinical  of Neurology [facility].

## 2021-06-11 NOTE — PATIENT INSTRUCTIONS
Neurology Clinic Visit     IMPORTANT: If imaging, procedure(s), AND/or referrals were placed for you:  Contact [facility] Scheduling if you have not heard from them in 5 day: [phone]    Outpatient [facility] Imaging Sites.  • [address]  Mon-Fri 8am-5pm   • [address] (Breast Center) Mon-Fri 7am-4pm    • [address]  Mon-Fri 8am-5pm  • [facility] 7am-6:30pm  • [address] Mon-Fri 8am-7pm  Sat 9am-6pm   • [address]  Mon-Fri 8am-7pm and Sat/Sun 9am-5pm  • [address]  Mon-Fri 8am-5pm  • [address] (PET/CT)  Mon-Fri 8:30am-4:30pm     If labs were ordered for you:  • To Schedule an appointment for lab services, please call [phone] or visit [email or web address].  • [facility] Lab Services Convenient Locations:    [place]: • [address] (Ground Floor)  • [address] (Admitting Entrance)  • [address], [address]  • [address], [address]  • [address] [facility], [address]  • [address]  • [address] [place]: • [address]  • [address]       [place]: • [address]  • [address]     [facility]     [address] [address]     Laboratory Hours: 6:30am - 6pm  Monday - Friday,   7am - 2pm Saturday    [facility]      [address] [address]      Laboratory Hours:  7:30am - 4pm  Monday - Friday,  9am - 3pm Saturday    [facility]     [address]    [address] [place] [address]      Laboratory Hours:  7:00am - 5:30pm  Monday - Friday    [facility]      [address] [address]       Laboratory Hours:  7:30am - 4:30pm  Monday - Friday    [facility]       [address] [place] [address]       Laboratory Hours:  8am - 5pm  Monday - Friday    [facility]       [address] [place] [id number]        Laboratory hours:  7:30am - 4pm  Monday - Friday    GENERAL REMINDERS:  · Request refills 1 week in advance to ensure you do not run out of medications  · All diagnostic study results will be reviewed at your next visit, UNLESS there are urgent results that need to be acted on sooner.  · Please remember that it is the your responsibility to check with your Insurance for benefit coverage for visit / visits.  · 24 hours notice is required for all appointment changes or cancellation.  · Please arrive 20 min. before your appointment time  · Please note that because Dr. Johansen has high daily clinic volume, he is unable to accommodate late arrivals. If you are more than 15 minutes late for the scheduled appointment you will be asked to reschedule  · Please bring the following with you:  1) Picture ID  2) Insurance card  3) An updated list of ALL your medications and their dosages (prescribed medications, over the counter medications, and all supplements), as well as allergies with you at all times  4) A list of questions, concerns, comments that you wish to discuss with Dr. Eleno Johansen MD  General Neurologist and Epileptologist  Department of Neurology  Instructor of Clinical Neurology [facility].

## 2021-06-25 ENCOUNTER — APPOINTMENT (OUTPATIENT)
Dept: DERMATOLOGY | Facility: IMAGING CENTER | Age: 26
End: 2021-06-25
Payer: COMMERCIAL

## 2021-08-14 ENCOUNTER — HOSPITAL ENCOUNTER (EMERGENCY)
Facility: MEDICAL CENTER | Age: 26
End: 2021-08-14
Attending: EMERGENCY MEDICINE
Payer: COMMERCIAL

## 2021-08-14 VITALS
TEMPERATURE: 97.5 F | RESPIRATION RATE: 31 BRPM | SYSTOLIC BLOOD PRESSURE: 110 MMHG | HEIGHT: 59 IN | BODY MASS INDEX: 38.36 KG/M2 | OXYGEN SATURATION: 96 % | DIASTOLIC BLOOD PRESSURE: 63 MMHG | HEART RATE: 56 BPM | WEIGHT: 190.26 LBS

## 2021-08-14 DIAGNOSIS — N12 PYELONEPHRITIS: ICD-10-CM

## 2021-08-14 DIAGNOSIS — R10.9 FLANK PAIN: ICD-10-CM

## 2021-08-14 DIAGNOSIS — R11.0 NAUSEA: ICD-10-CM

## 2021-08-14 LAB
ALBUMIN SERPL BCP-MCNC: 4.3 G/DL (ref 3.2–4.9)
ALBUMIN/GLOB SERPL: 1.2 G/DL
ALP SERPL-CCNC: 82 U/L (ref 30–99)
ALT SERPL-CCNC: 25 U/L (ref 2–50)
ANION GAP SERPL CALC-SCNC: 12 MMOL/L (ref 7–16)
APPEARANCE UR: ABNORMAL
AST SERPL-CCNC: 19 U/L (ref 12–45)
BACTERIA #/AREA URNS HPF: ABNORMAL /HPF
BASOPHILS # BLD AUTO: 0.3 % (ref 0–1.8)
BASOPHILS # BLD: 0.03 K/UL (ref 0–0.12)
BILIRUB SERPL-MCNC: 0.3 MG/DL (ref 0.1–1.5)
BILIRUB UR QL STRIP.AUTO: NEGATIVE
BUN SERPL-MCNC: 13 MG/DL (ref 8–22)
CALCIUM SERPL-MCNC: 9.7 MG/DL (ref 8.4–10.2)
CHLORIDE SERPL-SCNC: 103 MMOL/L (ref 96–112)
CO2 SERPL-SCNC: 20 MMOL/L (ref 20–33)
COLOR UR: YELLOW
CREAT SERPL-MCNC: 0.95 MG/DL (ref 0.5–1.4)
EOSINOPHIL # BLD AUTO: 0.01 K/UL (ref 0–0.51)
EOSINOPHIL NFR BLD: 0.1 % (ref 0–6.9)
EPI CELLS #/AREA URNS HPF: ABNORMAL /HPF
ERYTHROCYTE [DISTWIDTH] IN BLOOD BY AUTOMATED COUNT: 47.2 FL (ref 35.9–50)
GLOBULIN SER CALC-MCNC: 3.5 G/DL (ref 1.9–3.5)
GLUCOSE SERPL-MCNC: 110 MG/DL (ref 65–99)
GLUCOSE UR STRIP.AUTO-MCNC: NEGATIVE MG/DL
HCG SERPL QL: NEGATIVE
HCT VFR BLD AUTO: 44.7 % (ref 37–47)
HGB BLD-MCNC: 14.6 G/DL (ref 12–16)
IMM GRANULOCYTES # BLD AUTO: 0.05 K/UL (ref 0–0.11)
IMM GRANULOCYTES NFR BLD AUTO: 0.5 % (ref 0–0.9)
KETONES UR STRIP.AUTO-MCNC: NEGATIVE MG/DL
LEUKOCYTE ESTERASE UR QL STRIP.AUTO: ABNORMAL
LIPASE SERPL-CCNC: 23 U/L (ref 7–58)
LYMPHOCYTES # BLD AUTO: 1.4 K/UL (ref 1–4.8)
LYMPHOCYTES NFR BLD: 13.2 % (ref 22–41)
MCH RBC QN AUTO: 31.1 PG (ref 27–33)
MCHC RBC AUTO-ENTMCNC: 32.7 G/DL (ref 33.6–35)
MCV RBC AUTO: 95.1 FL (ref 81.4–97.8)
MICRO URNS: ABNORMAL
MONOCYTES # BLD AUTO: 0.51 K/UL (ref 0–0.85)
MONOCYTES NFR BLD AUTO: 4.8 % (ref 0–13.4)
MUCOUS THREADS #/AREA URNS HPF: ABNORMAL /HPF
NEUTROPHILS # BLD AUTO: 8.57 K/UL (ref 2–7.15)
NEUTROPHILS NFR BLD: 81.1 % (ref 44–72)
NITRITE UR QL STRIP.AUTO: NEGATIVE
NRBC # BLD AUTO: 0 K/UL
NRBC BLD-RTO: 0 /100 WBC
PH UR STRIP.AUTO: 6.5 [PH] (ref 5–8)
PLATELET # BLD AUTO: 336 K/UL (ref 164–446)
PMV BLD AUTO: 9.4 FL (ref 9–12.9)
POTASSIUM SERPL-SCNC: 3.8 MMOL/L (ref 3.6–5.5)
PROT SERPL-MCNC: 7.8 G/DL (ref 6–8.2)
PROT UR QL STRIP: NEGATIVE MG/DL
RBC # BLD AUTO: 4.7 M/UL (ref 4.2–5.4)
RBC # URNS HPF: ABNORMAL /HPF
RBC UR QL AUTO: ABNORMAL
SODIUM SERPL-SCNC: 135 MMOL/L (ref 135–145)
SP GR UR STRIP.AUTO: 1.02
WBC # BLD AUTO: 10.6 K/UL (ref 4.8–10.8)
WBC #/AREA URNS HPF: ABNORMAL /HPF

## 2021-08-14 PROCEDURE — 700111 HCHG RX REV CODE 636 W/ 250 OVERRIDE (IP): Performed by: EMERGENCY MEDICINE

## 2021-08-14 PROCEDURE — 87086 URINE CULTURE/COLONY COUNT: CPT

## 2021-08-14 PROCEDURE — A9270 NON-COVERED ITEM OR SERVICE: HCPCS | Performed by: EMERGENCY MEDICINE

## 2021-08-14 PROCEDURE — 96374 THER/PROPH/DIAG INJ IV PUSH: CPT

## 2021-08-14 PROCEDURE — 96375 TX/PRO/DX INJ NEW DRUG ADDON: CPT

## 2021-08-14 PROCEDURE — 87077 CULTURE AEROBIC IDENTIFY: CPT

## 2021-08-14 PROCEDURE — 80053 COMPREHEN METABOLIC PANEL: CPT

## 2021-08-14 PROCEDURE — 85025 COMPLETE CBC W/AUTO DIFF WBC: CPT

## 2021-08-14 PROCEDURE — 83690 ASSAY OF LIPASE: CPT

## 2021-08-14 PROCEDURE — 99284 EMERGENCY DEPT VISIT MOD MDM: CPT

## 2021-08-14 PROCEDURE — 81001 URINALYSIS AUTO W/SCOPE: CPT

## 2021-08-14 PROCEDURE — 700102 HCHG RX REV CODE 250 W/ 637 OVERRIDE(OP): Performed by: EMERGENCY MEDICINE

## 2021-08-14 PROCEDURE — 84703 CHORIONIC GONADOTROPIN ASSAY: CPT

## 2021-08-14 RX ORDER — ONDANSETRON 2 MG/ML
4 INJECTION INTRAMUSCULAR; INTRAVENOUS ONCE
Status: COMPLETED | OUTPATIENT
Start: 2021-08-14 | End: 2021-08-14

## 2021-08-14 RX ORDER — KETOROLAC TROMETHAMINE 30 MG/ML
15 INJECTION, SOLUTION INTRAMUSCULAR; INTRAVENOUS ONCE
Status: COMPLETED | OUTPATIENT
Start: 2021-08-14 | End: 2021-08-14

## 2021-08-14 RX ORDER — ONDANSETRON 4 MG/1
4 TABLET, ORALLY DISINTEGRATING ORAL EVERY 8 HOURS PRN
Qty: 10 TABLET | Refills: 0 | Status: SHIPPED | OUTPATIENT
Start: 2021-08-14 | End: 2021-09-16

## 2021-08-14 RX ORDER — SULFAMETHOXAZOLE AND TRIMETHOPRIM 800; 160 MG/1; MG/1
1 TABLET ORAL 2 TIMES DAILY
Qty: 20 TABLET | Refills: 0 | Status: SHIPPED | OUTPATIENT
Start: 2021-08-14 | End: 2021-08-24

## 2021-08-14 RX ORDER — SULFAMETHOXAZOLE AND TRIMETHOPRIM 800; 160 MG/1; MG/1
1 TABLET ORAL ONCE
Status: COMPLETED | OUTPATIENT
Start: 2021-08-14 | End: 2021-08-14

## 2021-08-14 RX ADMIN — KETOROLAC TROMETHAMINE 15 MG: 30 INJECTION, SOLUTION INTRAMUSCULAR; INTRAVENOUS at 20:29

## 2021-08-14 RX ADMIN — SULFAMETHOXAZOLE AND TRIMETHOPRIM 1 TABLET: 800; 160 TABLET ORAL at 20:56

## 2021-08-14 RX ADMIN — ONDANSETRON 4 MG: 2 INJECTION INTRAMUSCULAR; INTRAVENOUS at 20:29

## 2021-08-14 ASSESSMENT — FIBROSIS 4 INDEX: FIB4 SCORE: 0.27

## 2021-08-14 NOTE — LETTER
8/17/2021               Orin Rodas  [address]   [address]  [place] [address]        Dear Orin (MR#[id number])    This letter is sent in regards to your recent visit to the [facility] Emergency Department on 8/14/2021. During the visit, tests were performed to assist the physician in your medical diagnosis. A review of your tests requires that we notify you of the following:    Your urine culture was NEGATIVE for bacterial growth. The antibiotic prescribed for you (sulfamethoxazole-trimethoprim) is not necessary for you to continue and can be safely stopped.      Please feel free to contact me at the number below if you have any questions or concerns. Thank you for your cooperation in the matter.    Sincerely,  ED Culture Follow-Up Staff  Georgette Patel, PharmD, PGY2 Emergency Medicine Pharmacy Resident    Former[facility], Emergency Department  [address] [place] [id number] [phone] (ED Culture Line)

## 2021-08-15 NOTE — ED PROVIDER NOTES
"ED Provider Note    CHIEF COMPLAINT  Chief Complaint   Patient presents with   • Flank Pain     RT \" all day \"  \" woke me up \"   • Nausea     and dry heaves   • Chills     No fever       HPI  Orin Rodas is a 26 y.o. female who presents for evaluation of right-sided flank pain that began this morning, she has been very nauseated and dry heaving but no vomiting. States she has had some diarrhea today. No abdominal pain. No burning or blood with urination. She has felt chills but no fever. Never had this pain before. She has a history of epilepsy on medication. Otherwise denies any medical problems. She has no other specific complaints at this time, no chest pain, shortness of breath or coughing.    REVIEW OF SYSTEMS  Negative for fever, rash, chest pain, dyspnea, abdominal pain, headache, focal weakness, focal numbness, focal tingling. All other systems are negative.     PAST MEDICAL HISTORY  Past Medical History:   Diagnosis Date   • Depression    • Epilepsy (HCC)    • Seizure (HCC)     since October       FAMILY HISTORY  Family History   Problem Relation Age of Onset   • Diabetes Maternal Grandmother        SOCIAL HISTORY  Social History     Tobacco Use   • Smoking status: Former Smoker     Types: Cigarettes   • Smokeless tobacco: Never Used   Vaping Use   • Vaping Use: Some days   • Substances: Flavoring   Substance Use Topics   • Alcohol use: No     Alcohol/week: 0.0 oz   • Drug use: No       SURGICAL HISTORY  History reviewed. No pertinent surgical history.    CURRENT MEDICATIONS  I personally reviewed the medication list in the charting documentation.     ALLERGIES  No Known Allergies    MEDICAL RECORD  I have reviewed patient's medical record and pertinent results are listed above.      PHYSICAL EXAM  VITAL SIGNS: /85   Pulse (!) 59   Temp 36.5 °C (97.7 °F) (Temporal)   Resp 16   Ht 1.499 m (4' 11\")   Wt 86.3 kg (190 lb 4.1 oz)   LMP 08/01/2021 (Exact Date)   SpO2 97%   Breastfeeding No  "  BMI 38.43 kg/m²    Constitutional: Well appearing patient in no acute distress.  Awake and alert, not toxic nor ill in appearance.  HENT: Normocephalic, no obvious evidence of acute trauma.  Eyes: No scleral icterus. Normal conjunctiva   Neck: Comfortable movement without any obvious restriction in the range of motion.  Cardiovascular: Upon ascultation I appreciate a regular heart rhythm and a normal rate.   Thorax & Lungs: Normal nonlabored respirations.  Upon application of the stethoscope for auscultation I find there to be no associated chest wall tenderness.  I appreciate no wheezing, rhonchi or rales. There is normal air movement.    Abdomen: The abdomen is not visibly distended. Upon palpation, I find it to be without tenderness.  No mass appreciated. Does have some mild tenderness overlying the right flank.  Skin: The exposed portions of skin reveal no obvious rash or other abnormalities.  Extremities/Musculoskeletal: No obvious sign of acute trauma. No asymmetric calf tenderness or edema.   Neurologic: Alert & oriented. No focal deficits observed.   Psychiatric: Normal affect appropriate for the clinical situation.    DIAGNOSTIC STUDIES / PROCEDURES    LABS/EKGs  Results for orders placed or performed during the hospital encounter of 08/14/21   CBC WITH DIFFERENTIAL   Result Value Ref Range    WBC 10.6 4.8 - 10.8 K/uL    RBC 4.70 4.20 - 5.40 M/uL    Hemoglobin 14.6 12.0 - 16.0 g/dL    Hematocrit 44.7 37.0 - 47.0 %    MCV 95.1 81.4 - 97.8 fL    MCH 31.1 27.0 - 33.0 pg    MCHC 32.7 (L) 33.6 - 35.0 g/dL    RDW 47.2 35.9 - 50.0 fL    Platelet Count 336 164 - 446 K/uL    MPV 9.4 9.0 - 12.9 fL    Neutrophils-Polys 81.10 (H) 44.00 - 72.00 %    Lymphocytes 13.20 (L) 22.00 - 41.00 %    Monocytes 4.80 0.00 - 13.40 %    Eosinophils 0.10 0.00 - 6.90 %    Basophils 0.30 0.00 - 1.80 %    Immature Granulocytes 0.50 0.00 - 0.90 %    Nucleated RBC 0.00 /100 WBC    Neutrophils (Absolute) 8.57 (H) 2.00 - 7.15 K/uL    Lymphs  (Absolute) 1.40 1.00 - 4.80 K/uL    Monos (Absolute) 0.51 0.00 - 0.85 K/uL    Eos (Absolute) 0.01 0.00 - 0.51 K/uL    Baso (Absolute) 0.03 0.00 - 0.12 K/uL    Immature Granulocytes (abs) 0.05 0.00 - 0.11 K/uL    NRBC (Absolute) 0.00 K/uL   COMP METABOLIC PANEL   Result Value Ref Range    Sodium 135 135 - 145 mmol/L    Potassium 3.8 3.6 - 5.5 mmol/L    Chloride 103 96 - 112 mmol/L    Co2 20 20 - 33 mmol/L    Anion Gap 12.0 7.0 - 16.0    Glucose 110 (H) 65 - 99 mg/dL    Bun 13 8 - 22 mg/dL    Creatinine 0.95 0.50 - 1.40 mg/dL    Calcium 9.7 8.4 - 10.2 mg/dL    AST(SGOT) 19 12 - 45 U/L    ALT(SGPT) 25 2 - 50 U/L    Alkaline Phosphatase 82 30 - 99 U/L    Total Bilirubin 0.3 0.1 - 1.5 mg/dL    Albumin 4.3 3.2 - 4.9 g/dL    Total Protein 7.8 6.0 - 8.2 g/dL    Globulin 3.5 1.9 - 3.5 g/dL    A-G Ratio 1.2 g/dL   LIPASE   Result Value Ref Range    Lipase 23 7 - 58 U/L   URINALYSIS,CULTURE IF INDICATED    Specimen: Urine, Clean Catch; Blood   Result Value Ref Range    Color Yellow     Character Sl Cloudy (A)     Specific Gravity 1.020 <1.035    Ph 6.5 5.0 - 8.0    Glucose Negative Negative mg/dL    Ketones Negative Negative mg/dL    Protein Negative Negative mg/dL    Bilirubin Negative Negative    Nitrite Negative Negative    Leukocyte Esterase Small (A) Negative    Occult Blood Small (A) Negative    Micro Urine Req Microscopic    HCG QUAL SERUM   Result Value Ref Range    Beta-Hcg Qualitative Serum Negative Negative   URINE MICROSCOPIC (W/UA)   Result Value Ref Range    WBC 10-20 (A) /hpf    RBC 10-20 (A) /hpf    Bacteria Few (A) None /hpf    Epithelial Cells Moderate (A) Few /hpf    Mucous Threads Few /hpf   URINE CULTURE(NEW)    Specimen: Urine   Result Value Ref Range    Significant Indicator NEG     Source UR     Site -     Culture Result -    ESTIMATED GFR   Result Value Ref Range    GFR If African American >60 >60 mL/min/1.73 m 2    GFR If Non African American >60 >60 mL/min/1.73 m 2        COURSE & MEDICAL DECISION  MAKING  I have reviewed any medical record information, laboratory studies and radiographic results as noted above.  Differential diagnoses includes: Pyelonephritis, anemia, dehydration, electrolyte abnormalities, biliary obstruction, hepatitis,    Encounter Summary: This is a very pleasant 26 y.o. female who unfortunately required evaluation in the emergency department today with right-sided flank pain with chills and nausea beginning this morning, appears very well on exam, a benign abdomen. Vital signs are unremarkable except for mild hypertension. She has an initial urinalysis ordered by the nursing staff which is not completely normal, 10-20 WBCs on microscopy with some bacteria although epithelial cells are present as well. However given her story I am concerned about pyelonephritis. The rest of the triage ordered blood work will be obtained, she will be treated with Toradol and Zofran here in the emergency department and reevaluated ------- blood work is otherwise unremarkable. She will be treated with Bactrim for her pyelonephritis, first dose here in the emergency department and she will be discharged with a 10-day course of Bactrim. Strict return instructions provided, discharged home in stable condition.      DISPOSITION: Discharged home in stable condition      FINAL IMPRESSION  1. Pyelonephritis    2. Flank pain    3. Nausea           This dictation was created using voice recognition software. The accuracy of the dictation is limited to the abilities of the software. I expect there may be some errors of grammar and possibly content. The nursing notes were reviewed and certain aspects of this information were incorporated into this note.    Electronically signed by: Austin Ballesteros M.D., 8/14/2021 7:51 PM

## 2021-08-15 NOTE — ED NOTES
Pt discharged with instructions and e-script. Pt told to follow up Dr. Patel as needed and to drink plenty of water.   Pt verbalized understanding of discharge instructions.  Pt ambulated out of ED with significant other

## 2021-08-17 LAB
BACTERIA UR CULT: ABNORMAL
BACTERIA UR CULT: ABNORMAL
SIGNIFICANT IND 70042: ABNORMAL
SITE SITE: ABNORMAL
SOURCE SOURCE: ABNORMAL

## 2021-08-18 NOTE — ED NOTES
ED Positive Culture Follow-up/Notification Note:      Date: 8/17/21     Patient seen in the ED on 8/14/2021 for flank pain, nausea and chills. Called patient to discuss results, however, they did not  the phone. Left voicemail and sent communication through [employer]. Negative UA and negative urine culture (normal irasema 10-50k GBS).     1. Pyelonephritis    2. Flank pain    3. Nausea       Discharge Medication List as of 8/14/2021  8:59 PM      START taking these medications    Details   sulfamethoxazole-trimethoprim (BACTRIM DS) 800-160 MG tablet Take 1 Tablet by mouth 2 times a day for 10 days., Disp-20 Tablet, R-0, Normal      ondansetron (ZOFRAN ODT) 4 MG TABLET DISPERSIBLE Take 1 Tablet by mouth every 8 hours as needed., Disp-10 Tablet, R-0, Normal             Allergies: Patient has no known allergies.     Vitals:    08/14/21 2004 08/14/21 2035 08/14/21 2059 08/14/21 2101   BP: 120/70 (!) 95/77 110/63    Pulse: (!) 58 (!) 55 (!) 56    Resp: (!) 23 (!) 31     Temp:    36.4 °C (97.5 °F)   TempSrc:       SpO2: 96% 97% 96%    Weight:       Height:           Final cultures:   Results     Procedure Component Value Units Date/Time    URINE CULTURE(NEW) [[id number]]  (Abnormal) Collected: 08/14/21 1930    Order Status: Completed Specimen: Urine Updated: 08/17/21 0732     Significant Indicator POS     Source UR     Site -     Culture Result Usual skin irasema ,000 cfu/mL      Streptococcus agalactiae (Group B)  10-50,000 cfu/mL      Narrative:      Indication for culture:->Patient WITHOUT an indwelling Hull  catheter in place with new onset of Dysuria, Frequency,  Urgency, and/or Suprapubic pain    URINALYSIS,CULTURE IF INDICATED [[id number]]  (Abnormal) Collected: 08/14/21 1930    Order Status: Completed Specimen: Blood from Urine, Clean Catch Updated: 08/14/21 1941     Color Yellow     Character Sl Cloudy     Specific Gravity 1.020     Ph 6.5     Glucose Negative mg/dL      Ketones Negative mg/dL      Protein  Negative mg/dL      Bilirubin Negative     Nitrite Negative     Leukocyte Esterase Small     Occult Blood Small     Micro Urine Req Microscopic    Narrative:      Indication for culture:->Patient WITHOUT an indwelling Hull  catheter in place with new onset of Dysuria, Frequency,  Urgency, and/or Suprapubic pain          Plan:   Recommend discontinuing antibiotic since antibiotic therapy is not indicated at this time. Sent letter to patient via [employer] to notify discontinuation of antibiotics.    Georgette Patel, PharmD, PGY2 Emergency Medicine Pharmacy Resident

## 2021-09-09 ENCOUNTER — OFFICE VISIT (OUTPATIENT)
Dept: NEUROLOGY | Facility: MEDICAL CENTER | Age: 26
End: 2021-09-09
Attending: STUDENT IN AN ORGANIZED HEALTH CARE EDUCATION/TRAINING PROGRAM
Payer: COMMERCIAL

## 2021-09-09 VITALS
DIASTOLIC BLOOD PRESSURE: 76 MMHG | OXYGEN SATURATION: 97 % | WEIGHT: 189.3 LBS | SYSTOLIC BLOOD PRESSURE: 128 MMHG | BODY MASS INDEX: 38.16 KG/M2 | TEMPERATURE: 97.6 F | HEIGHT: 59 IN | HEART RATE: 68 BPM

## 2021-09-09 DIAGNOSIS — G47.19 EXCESSIVE DAYTIME SLEEPINESS: ICD-10-CM

## 2021-09-09 DIAGNOSIS — H53.9 VISION CHANGES: ICD-10-CM

## 2021-09-09 DIAGNOSIS — G93.89 CEREBRAL VENTRICULOMEGALY: ICD-10-CM

## 2021-09-09 DIAGNOSIS — G44.89 OTHER HEADACHE SYNDROME: ICD-10-CM

## 2021-09-09 DIAGNOSIS — F32.89 OTHER DEPRESSION: ICD-10-CM

## 2021-09-09 DIAGNOSIS — R06.83 SNORING: ICD-10-CM

## 2021-09-09 DIAGNOSIS — G40.804 OTHER INTRACTABLE EPILEPSY WITHOUT STATUS EPILEPTICUS (HCC): ICD-10-CM

## 2021-09-09 DIAGNOSIS — E66.3 OVERWEIGHT: ICD-10-CM

## 2021-09-09 DIAGNOSIS — R79.89 LOW VITAMIN D LEVEL: ICD-10-CM

## 2021-09-09 DIAGNOSIS — E66.9 OBESITY (BMI 30-39.9): ICD-10-CM

## 2021-09-09 DIAGNOSIS — U07.1 COVID-19 VIRUS INFECTION: ICD-10-CM

## 2021-09-09 DIAGNOSIS — R56.9 SEIZURE (HCC): ICD-10-CM

## 2021-09-09 DIAGNOSIS — F41.9 ANXIETY: ICD-10-CM

## 2021-09-09 DIAGNOSIS — G93.2 INTRACRANIAL HYPERTENSION: ICD-10-CM

## 2021-09-09 DIAGNOSIS — H54.7 REDUCED VISUAL ACUITY: ICD-10-CM

## 2021-09-09 DIAGNOSIS — R51.0 POSITIONAL HEADACHE: ICD-10-CM

## 2021-09-09 DIAGNOSIS — M54.2 CERVICALGIA: ICD-10-CM

## 2021-09-09 PROCEDURE — 99211 OFF/OP EST MAY X REQ PHY/QHP: CPT | Performed by: STUDENT IN AN ORGANIZED HEALTH CARE EDUCATION/TRAINING PROGRAM

## 2021-09-09 PROCEDURE — 99215 OFFICE O/P EST HI 40 MIN: CPT | Performed by: STUDENT IN AN ORGANIZED HEALTH CARE EDUCATION/TRAINING PROGRAM

## 2021-09-09 PROCEDURE — 99417 PROLNG OP E/M EACH 15 MIN: CPT | Performed by: STUDENT IN AN ORGANIZED HEALTH CARE EDUCATION/TRAINING PROGRAM

## 2021-09-09 RX ORDER — CLOBAZAM 20 MG/1
30 TABLET ORAL
Qty: 45 TABLET | Refills: 2 | Status: SHIPPED | OUTPATIENT
Start: 2021-09-09 | End: 2022-03-13

## 2021-09-09 RX ORDER — ZOLPIDEM TARTRATE 5 MG/1
TABLET ORAL
Qty: 2 TABLET | Refills: 0 | Status: SHIPPED | OUTPATIENT
Start: 2021-09-09 | End: 2021-09-10

## 2021-09-09 RX ORDER — ZONISAMIDE 100 MG/1
400 CAPSULE ORAL DAILY
Qty: 120 CAPSULE | Refills: 5 | Status: SHIPPED | OUTPATIENT
Start: 2021-09-09 | End: 2022-05-11

## 2021-09-09 ASSESSMENT — FIBROSIS 4 INDEX: FIB4 SCORE: 0.28

## 2021-09-09 NOTE — PROGRESS NOTES
GENERAL NEUROLOGY FOLLOW-UP VISIT 09/09/21    REFERRING PROVIDER:  Ed Patel M.D.  [address] [facility] [place] [id number]      CHIEF COMPLAINT(S): seizures, headaches,    History of present illness per my initial encounter with patient:  Orin Rodas 25 y.o. female presents today for evaluation of seizures and headaches.    Thinks she may be having worsening side effects of medication (zonisamide) vs seizures. 2 months been having hard time getting words out and speaking gibberish. Everything goes fuzzy on sides of eyes.  Happening once per week. Unclear triggers. Random.    Has had headaches for years. Getting worse.  Start in back of head and migrate to front. Starts in back as a tightness and pound when it gets to front. Zonegram. They are brought on by lying down and completely relieved by sitting up. Vision is blurry with them too. Happening daily. Has only tried ibuprofen    Sleeping is really bad. Multiple reasons: feels anxious and sad about her health, headaches, can't turn her mind off.    Seizures: 2015 started out with GTCs. Aura:. Unable to talk, becomes mute then GTC. Note this aura is the same symptoms as her current complaint above.. Since 2019 no GTCs. Between 2015 and 2019 had seizures 3 tmes per month. Once put on zonismaide they stopped.     Having Dizzy spells a lot, vertigo and lighthead once per week. Stress is trigger.    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     Been on keppra and lamictal. Didn't work.       Depressed and anxious.     Diet is not great.     Walks a lot in her job.     Patient's PMH, PSH, FH, and SH were reviewed.    Low vitamin D levels.    Medications and allergies were reviewed.    INTERVAL HISTORY 2/26/21  Headaches better  Had seizure 2 weeks ago. Laid on side and convulsed for less than a minute. Used to get them 3-4 times per months. They are less frequent now it appears but still too early to tell. Only added Onfi. Her  sleep is better because of the Onfi. Both Onfi and better sleep may be helping with seizures. Headaches better too as mentioned.   Also started venlafaxine 25 mg BID two months ago. May also be helping with the headaches and mood.  LP and CSF studies sent. Opening pressure upper limit of normal/borderline abnormal.  MRV normal. Unlikely IIH.  Pending EMU admission.  No papiledema.    IUD implant is now progesterone.    INTERVAL HISTORY 6/11:  • 3 GTCs in 24 hours. Each lasted a couple of minutes with post-ictal confusion, aphasia. Aura of being Unable to talk as previously described then goes into GTC  • Insomnia. Sleep 4-5 hours per night the past 2 weeks because of work stress  • Headaches  -every other day. Previously they were daily  • Anxious and depressed about work stressors and the seizures  • She bit the side of her tongue  • No issues with compliance. Onfi 10 mg initially helped with the sleep but became less effective more recently. Still on Zonisamide   • Labs and imaging and procedures reviewed. Low vitamin D levels. LP had a normal opening pressure. Patient denied any improvement or worsening of headache symptoms after LP. MRV normal. CSF WNL.  • Reports getting  viral infection that started last week which she is still getting over  • Taking folic acid  •     INTERVAL HISTORY:  • Returns for urgent follow-up visit for a cluster of multiple seizures  • Re-reviewed lumbar puncture 3/215/21 - OP was borderline high at 20 cm H20. No large volume tap was does to expect improvement in her symptoms.  • Had 3 minute GTCs and couple of smaller focal unaware seizures. Aura of feeling like she is going to black out second before..   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  • Headaches are rare now though.  • Vison is getting worse  • Insurance denied Midazolam nasal spray  • Also has severe snoring, multiple night time awakenings, excessive  daytime sleepiness, occasion morning headaches -      Current medications:   Current Outpatient Medications   Medication   • zonisamide (ZONEGRAN) 100 MG Cap   • cloBAZam 20 MG Tab   • ondansetron (ZOFRAN ODT) 4 MG TABLET DISPERSIBLE   • Cholecalciferol (VITAMIN D) 125 MCG (5000 UT) Cap   • venlafaxine (EFFEXOR) 50 MG tablet   • Midazolam 5 MG/0.1ML Solution   • benzocaine-menthol (CEPACOL) 15-3.6 MG Lozenge   • clobetasol (TEMOVATE) 0.05 % external solution   • ketoconazole (NIZORAL) 2 % shampoo   • folic acid (FOLVITE) 1 MG Tab     No current facility-administered medications for this visit.       Medication Allergy:  No Known Allergies      Review of systems:   Pertinent positives and negatives are as outlined above      Physical examination:   Vitals:    09/09/21 1411   BP: 128/76   Pulse: 68   Temp: 36.4 °C (97.6 °F)   SpO2: 97%       General: Patient in no acute distress, pleasant and cooperative.  Neck: appears supple, here is normal range of motion. No tenderness on exam.   Chest: clear to auscultation. Symmetrical chest rise with inhalation. No cough.   CV: RRR, no murmurs.   Skin: no signs of acute rashes or trauma.   Musculoskeletal: midline cervical and paraspinal tenderness.  Psychiatric: pertinent positives as discussed above    NEUROLOGICAL EXAM:   Mental status:  orientation: Awake, alert and oriented to self, month, year, situation.  Attention: Intact  Speech and language: speech is clear and fluent. The patient is able to name, repeat and comprehend. No word substitions or paraphasic errors  Memory: There is intact recollection of recent and remote events.   Cranial nerve exam:   I: smell Not tested   II: visual acuity  OD: 20/20, corrected, 1 error  OS: 20/100, corrected   II: Fundoscpic Fundus poorly visualized bilaterally, but possible mild papilledema appreciate.   II: visual fields Full to confrontation  Visual neglect: absent   II: pupils L>R poor/sluggish reactivity. No APD   III,VII: ptosis  None   III,IV,VI: extraocular muscles  EOMI   V: mastication Normal   V: facial light touch sensation  Normal   V,VII: corneal reflex  Not tested   VII: facial muscle function - upper  Normal   VII: facial muscle function - lower Normal   VIII: hearing Not tested   IX: soft palate elevation  Normal   IX,X: gag reflex Not tested   XI: trapezius strength  NT   XI: sternocleidomastoid strength NT   XI: neck flexion strength  NT   XII: tongue  Protrudes Midline     Motor exam:   • Strength is 5/5 in the distal and proximal upper and lower extremities   • No pronator drift  • Tone is normal.  • No abnormal movements were seen on exam.   • No muscle fasciculation  • No areas of atrophy  Sensory exam reveals normal sense of light touch, proprioception, vibration and pinprick in all extremities. Cortical sensory testing: Normal. Test of neglect: none present to simultaneous stimulation with touch  Deep tendon reflexes:  2+ throughout. Plantar responses are mute  .There is no clonus.   Coordination: shows a normal finger-nose-finger.  Heel-knee-shin movements smooth and coordinated bilaterally.   Gait:   • The patient was able to get up from seated position on first attempt without requiring assistance.   • Found to be steady when walking.   • Movements were fluid with normal arm swing.   • The patient was able to turn without difficulties or tendency to fall.   • Romberg exam absent      MRI from 10, 2016 personally reviewed again, as was MRV from 3/21, and CTH most recently from 2/2021.    Most recent CTH notable for ventriculometry, possibly non-communicating as I do not see flow occlusion of central canal.     ASSESSMENT, PLAN, EDUCATION/COUNSELING:  Had an extensive discussion about her seizures, mood, headaches, insomnia, and stress. She had a cluster of breakthrough seizures unclear trigger but she has ungoing commorbidities making her more susceptible (ie. mood symptoms,  Chronic stress, being over-worked, insomnia).  Will make the following adjustments to AEDs as outlned below. Also will appeal her rescue midazolam that got denies by insurance as it is an absolute necessity she have this. I am recommending work restrictions given that her stress and insomnia is likely making her other health problems worse, including seizures. Letter written, printed, signed, and given to the patient.    Discussed her images again, noting her ventriculometry, position dependent headaches, episodic blurry/blacking of vision concerning for intracranial hypertension/hydrocephalus. Ordering CSF flow study to evaluate. She also needs referral to neuro-ophthalmology       PLAN:  · Seizures/Epilepsy:   · Increase zonisamde 200/100 from 100 mg AM/300 mg PM. Increasing the dose for improved seizure control and stacking more of it at night to help with sleep and avoid side effects during day  · Increase Onfi to 30 mg QHS.  · Will appeal the rescue midazolam 5 mg as it is a medical necessity as she has prolonged seizures that cluster   · Patient to continue with progesterone-only IUD.   · She has a pending EMU admission from months ago that still needs to get schedule to characterize her seizures     · History of UTI/Pyelnephritis  · We did not discuss this issue with patient this visit but upon reviewing recent history she had pyelonephritis. We need to follow-up on this and exclude as kidney stones as possible culprit, because if she does have kidney stones then she cannot be on zonisamde    · Signs, symptoms, risk factors concerning for sleep disordered breathing  · Needs sleep medicine referral and sleep study done. Untreated EDGARD can worsen headaches and worsen underlying intracranial hypertension. Referrals placed    · Insomnia, likely multi-factorial secondary to anxiety and stress, pain, headaches  · Manage stress and mood symptoms - c/w effexor 50 mg TID  · Stacking more of her AEDs (Onfi and zonisamde) at nighttime which are sedating and may help  with falling/staying asleep    · Stress/Depression/Anxiety  · C/w effexor. May consider alternative or ad adjunct medication to further help with this  · Address insomnia  ·     · Primary headache disorder with migrainous features  · Possible secondary headache disorder secondary to elevated intracranial hypertension  · Ventriculometry with possible non-communicating hydrocephalus  · C/w effexor as above  · addressing insomnia and referral to sleep medicine to evaluate for sleep disordered breathing which if present and then treated will improve both types of headache disorders  · Will consider nurtec for abortive treatment  · Ordered CSF flow study to evaluate for impaired CSF flow, obstruction to account for headache symptoms as well as vision decline  · Patient needs referral to neuro-ophthalmology for updated eye exam given concern for intracranial HTN, hydrocephalus  · Will consider diamox if headaches worsen or vision worsens  ·     · Vitamin D decieincy  · Continue vitamin D 67227 IU  · Recheck Vitamin D levels next visit      Visit Diagnoses     ICD-10-CM   1. Seizure (HCC)  R56.9   2. Vision changes  H53.9   3. Anxiety  F41.9   4. Other depression  F32.89   5. Positional headache  R51.0   6. Overweight  E66.3   7. Other headache syndrome  G44.89   8. Other intractable epilepsy without status epilepticus (HCC)  G40.804   9. Intracranial hypertension  G93.2   10. Cerebral ventriculomegaly  G93.89   11. Reduced visual acuity  H54.7   12. Cervicalgia  M54.2   13. Excessive daytime sleepiness  G47.19   14. Snoring  R06.83   15. Low vitamin D level  R79.89   16. COVID-19 virus infection  U07.1   17. Obesity (BMI 30-39.9)  E66.9        Orders Placed This Encounter   • Polysomnography, 4 or More   • MR-CSF FLOW STUDY-SPINAL   • AMB REFERRAL TO NEURO OPHTHALMOLOGY   • REFERRAL TO PULMONARY AND SLEEP MEDICINE   • zonisamide (ZONEGRAN) 100 MG Cap   • cloBAZam 20 MG Tab   • zolpidem (AMBIEN) 5 MG Tab        EPILEPSY  EDUCATION AND COUNSELING:  [x] Education was provided to patient and/or family regarding diagnosis and prognosis. The chronic and unpredictable nature of the condition was discussed. There is increased risk for additional events, which may carry potential for significant injuries and death.   [x] Discussed frequent seizure triggers: sleep deprivation, medication non-compliance, use of illegal drugs/alcohol, stress, and others.   [x] We reviewed in detail the current antiepileptic regimen. Potential side effects of antiepileptics were discussed at length, including but no limited to:   • hypersensitivity reactions (rash and others, some of which can be fatal),   • visual field changes (some of which may be irreversible),   • glaucoma,   • diplopia,   • kidney stones,   • osteopenia/osteoporosis/bone fractures,   • hyperthermia/anhydrosis,   • hyponatremia,   • tremors/abnormal movements,   • ataxia,   • dizziness,   • fatigue,   • increased risk for falls,   • risk for cardiac arrhythmias/syncope,   • gastrointestinal side effects(hepatitis, pancreatitis, gastritis, ulcers),   • gingival hypertrophy/bleeding,   • Drowsiness/sedation,   • anxiety/nervousness, irritability, restlessness  • Other psychiatric side effects: increased risk for suicide, increased risk for depression  • Potential for causing psychosis.   [x] Reviewed drug-drug interactions and their potential effect on seizure control   [x] Discussed potential effects of seizures, epilepsy, and medications during pregnancy, including but not limited to fetal malformations, child developmental/intellectual disability, fetal/ risk for hemorrhages, stillbirth, maternal death, premature birth, and others. The patient/family aware that pregnancy should be avoided, unless desired, in which case we recommend discussing with us at least a year prior to planned conception. To avoid undesired pregnancy while on antiepileptics, we recommend dual contraception.    [x] Folic acid 2 mg is recommended for all females in childbearing age (12-44 years of age).   [x] Recommend chronic vitamin D supplementation   [x] Recommend regular exercise (if not contraindicated).   [x] Educated on risk for SUDEP (Sudden Death in Epilepsy). Counseling was provided on the importance of strict medication and follow up compliance. The patient/family understand the risks associated with non-adherence with the medical plan as outlined, including but not limited to an increased risk for breakthrough seizures, which may contribute to injuries, disability, status epilepticus, and even death.   [x] Counseling was also provided on potential effects of alcohol and other drugs, which may lower seizure threshold and/or affect the metabolism of antiepileptic drugs. We recommend avoidance of alcohol and illegal drugs.  [x] Discussed aspects related to safety in drivers who suffer from epilepsy. The patient is encourage to report to the Division of Motor Vehicles of any condition and/or spells related to confusion, disorientation, and/or loss of awareness and/or loss of consciousness; as these may pose a safety issue if they occur while operating a motor vehicle. The patient and/or family are ultimately responsible for exercising caution and abiding to regulations in place.   [x] Other seizure precautions were discussed at length, including  no skydiving, no climbing or exposure to unprotected heights, no unsupervised swimming, no Jacuzzi or bathing in bathtubs or deep bodies of water.   [x]Counseled about risks for operating any machinery while suffering from seizures / syncope / epilepsy and/or while taking antiepileptic drugs.   [x] Counseled that due to the complexity of his/her diagnosis, results of any testing and further recommendations will typically be discussed/made during a face to face encounter in my office.   [x] Discussed that patient and/or family further understands it is their  responsibility to keep proper follow up.     Patient/family agree with plan, as outlined.       FOLLOW-UP:   4-8 weeks            BILLING DOCUMENTATION:       Counseling:  I spent a total of 70 minutes of face-to-face time in this visit. Over 50% of the time of the visit today was spent on counseling and or coordination of care wtih the patient and/or family, as above in assessment in plan.       Pablo Johansen MD  Epilepsy and General Neurology  Department of Neurology  Clinical  of Neurology [facility].

## 2021-09-09 NOTE — LETTER
September 9, 2021    To Whom It May Concern:         Orin Rodas is a patient of mine who I have been seeing for multiple chronic neurological conditions, including headaches and refractory epilepsy. Stress, physical and mental overexertion, and poor sleep are seizure triggers for her. It is my strong recommendation that work restrictions be implemented for the safety and welfare of Ms. Rodas. Specifically, I recommend that she work no more than 5 days in a week, she should work no more than 8 hours per shift, and should be allotted at least 2 breaks of 15 minutes during her shift.  If you have any questions please feel free to contact me directly.    Sincerely,               Pablo Johansen MD  Epilepsy and General Neurology  Department of Neurology  Instructor of Clinical Neurology [employer].

## 2021-09-16 ENCOUNTER — OFFICE VISIT (OUTPATIENT)
Dept: MEDICAL GROUP | Facility: PHYSICIAN GROUP | Age: 26
End: 2021-09-16
Payer: COMMERCIAL

## 2021-09-16 ENCOUNTER — HOSPITAL ENCOUNTER (OUTPATIENT)
Facility: MEDICAL CENTER | Age: 26
End: 2021-09-16
Attending: INTERNAL MEDICINE
Payer: COMMERCIAL

## 2021-09-16 VITALS
WEIGHT: 189 LBS | BODY MASS INDEX: 38.1 KG/M2 | TEMPERATURE: 98.6 F | HEIGHT: 59 IN | SYSTOLIC BLOOD PRESSURE: 106 MMHG | HEART RATE: 63 BPM | DIASTOLIC BLOOD PRESSURE: 70 MMHG | RESPIRATION RATE: 16 BRPM | OXYGEN SATURATION: 98 %

## 2021-09-16 DIAGNOSIS — Z87.440 HISTORY OF UTI: ICD-10-CM

## 2021-09-16 DIAGNOSIS — R56.9 SEIZURE (HCC): Chronic | ICD-10-CM

## 2021-09-16 PROBLEM — F32.A DEPRESSION: Chronic | Status: ACTIVE | Noted: 2021-09-16

## 2021-09-16 PROBLEM — F32.A DEPRESSION: Status: ACTIVE | Noted: 2021-09-16

## 2021-09-16 PROCEDURE — 99214 OFFICE O/P EST MOD 30 MIN: CPT | Performed by: INTERNAL MEDICINE

## 2021-09-16 PROCEDURE — 87086 URINE CULTURE/COLONY COUNT: CPT

## 2021-09-16 RX ORDER — ZOLPIDEM TARTRATE 5 MG/1
TABLET ORAL
COMMUNITY
Start: 2021-09-11 | End: 2022-04-01

## 2021-09-16 ASSESSMENT — FIBROSIS 4 INDEX: FIB4 SCORE: 0.28

## 2021-09-16 NOTE — ASSESSMENT & PLAN NOTE
Chronic condition per the patient is now followed by neurology service.  She is presently taking Zonegran and clobazam.

## 2021-09-16 NOTE — ASSESSMENT & PLAN NOTE
This is a chronic condition for the patient is being treated with Effexor  50 mg 3 times a day.  Patient denies significant side effects.

## 2021-09-16 NOTE — ASSESSMENT & PLAN NOTE
This patient was seen in the emergency room August 2021 to pyelonephritis.  Patient was treated with antibiotic and has noted improvement however patient still noted dark-colored urine.  She also complaining of intermittent vaginal discharge.  The patient has pending appointment to see gynecologist.  Patient denies fever or chills.  No dysuria.  No gross hematuria noted.

## 2021-09-18 LAB
BACTERIA UR CULT: NORMAL
SIGNIFICANT IND 70042: NORMAL
SITE SITE: NORMAL
SOURCE SOURCE: NORMAL

## 2021-09-30 ENCOUNTER — OFFICE VISIT (OUTPATIENT)
Dept: SLEEP MEDICINE | Facility: MEDICAL CENTER | Age: 26
End: 2021-09-30
Payer: COMMERCIAL

## 2021-09-30 VITALS
OXYGEN SATURATION: 98 % | HEIGHT: 59 IN | SYSTOLIC BLOOD PRESSURE: 102 MMHG | HEART RATE: 75 BPM | DIASTOLIC BLOOD PRESSURE: 62 MMHG | WEIGHT: 187 LBS | RESPIRATION RATE: 14 BRPM | BODY MASS INDEX: 37.7 KG/M2

## 2021-09-30 DIAGNOSIS — G47.30 SLEEP DISORDER BREATHING: ICD-10-CM

## 2021-09-30 DIAGNOSIS — R56.9 NOCTURNAL SEIZURES (HCC): Primary | ICD-10-CM

## 2021-09-30 DIAGNOSIS — G47.21 DELAYED SLEEP PHASE SYNDROME: ICD-10-CM

## 2021-09-30 DIAGNOSIS — G47.00 FREQUENT NOCTURNAL AWAKENING: ICD-10-CM

## 2021-09-30 DIAGNOSIS — F51.04 CHRONIC INSOMNIA: ICD-10-CM

## 2021-09-30 PROCEDURE — 99204 OFFICE O/P NEW MOD 45 MIN: CPT | Performed by: STUDENT IN AN ORGANIZED HEALTH CARE EDUCATION/TRAINING PROGRAM

## 2021-09-30 ASSESSMENT — FIBROSIS 4 INDEX: FIB4 SCORE: 0.28

## 2021-09-30 NOTE — PROGRESS NOTES
[facility] Televisit Consult Note     Date: 9/30/2021 / Time: 9:50 AM      Thank you for requesting a sleep medicine consultation on Orin Rodas at the sleep center. Presents today with the chief complaints of nocturnal seizure. She is referred by Pablo Johansen M.D.  [address] [place] [address],  [place] [id number] for evaluation and treatment of nocturnal seizure.     HISTORY OF PRESENT ILLNESS:     Orin Rodas is a 26 y.o. female with epilepsy, depression, obesity, chronic insomnia, headaches and daytime sleepiness.  Presents to Sleep Clinic for evaluation of nocturnal seizures.    She reports approximately 1 to 2 months ago in the early morning she started to scream per her bed partner and then went into a grand mal seizure.  This lasted for a few seconds and then she had 7 more seizures per her bed partner.  She has a known history of epilepsy starting at age 20 which she follows regularly with her neurologist.  Her seizures have been fairly controlled throughout this time and she continues to take her medications as prescribed.  She was referred by her neurologist to further evaluate for nocturnal seizures.    She does snore in her sleep and finds it hard to get to sleep.  She works as a  and works until midnight to 2 AM.  She generally falls asleep between 4 and 5 AM.  She finds she has trouble falling asleep and then when she is asleep she will awaken 5-10 times at night due to sounds or trying to get comfortable.  Every night she states it takes him longer than 30 minutes to get back to sleep with awakenings.  She tends to wake to start her day between 1130 and 12:30 PM.  She does not nap during the day.  She is not currently driving.    [place] Sleepiness Score: 15    Sleep Schedule  Bedtime: Weekday & Weekend 0400  Wake time: Weekday & Weekend 1130am to 1230pm  Sleep-onset latency: hours   Awakenings from sleep: 5-10 times a night, sounds or comfort    Difficulty  "falling back asleep: yes every day  Bedroom partner: yes   Naps: No     DAYTIME SYMPTOMS:   Excessive daytime sleepiness: Yes  Daytime fatigue: Yes  Difficulty concentrating: Yes  Memory problems: Yes  Irritability:Yes  Work/school performance issues: No   Sleepiness with driving: No   Caffeine/stimulant use: Yes twice a week a coffee  Alcohol use:No     SLEEP RELATED SYMPTOMS  Snoring: Yes  Witnessed apnea or gasping/choking: Yes  Dry mouth or mouth breathing: Yes  Sweating: Yes  Teeth grinding/biting: No   Morning headaches: Yes sometimes   Refreshed Upon Awakening: No      SLEEP RELATED BEHAVIORS:  Parasomnias (walking, talking, eating, violence): No   Leg kicking: No   Restless legs - \"urge to move\": Yes  Nightmares: No  Recurrent: No   Dream enactment: No      NARCOLEPSY:  Cataplexy: No   Sleep paralysis: No   Sleep attacks: No   Hypnagogic/hypnopompic hallucinations: No     MEDICAL HISTORY  Past Medical History:   Diagnosis Date   • Depression    • Epilepsy (HCC)    • Seizure (HCC)     since October        SURGICAL HISTORY  History reviewed. No pertinent surgical history.     FAMILY HISTORY  Family History   Problem Relation Age of Onset   • Diabetes Maternal Grandmother        SOCIAL HISTORY  Social History     Socioeconomic History   • Marital status: Single     Spouse name: Not on file   • Number of children: Not on file   • Years of education: Not on file   • Highest education level: Not on file   Occupational History   • Not on file   Tobacco Use   • Smoking status: Former Smoker     Types: Cigarettes   • Smokeless tobacco: Never Used   Vaping Use   • Vaping Use: Some days   • Substances: Flavoring   Substance and Sexual Activity   • Alcohol use: No     Alcohol/week: 0.0 oz   • Drug use: No   • Sexual activity: Yes   Other Topics Concern   • Not on file   Social History Narrative    ** Merged History Encounter **         [employer] -senior    Lives with Mom, sisters, brother             Social Determinants " of Health     Financial Resource Strain:    • Difficulty of Paying Living Expenses:    Food Insecurity:    • Worried About Running Out of Food in the Last Year:    • Ran Out of Food in the Last Year:    Transportation Needs:    • Lack of Transportation (Medical):    • Lack of Transportation (Non-Medical):    Physical Activity:    • Days of Exercise per Week:    • Minutes of Exercise per Session:    Stress:    • Feeling of Stress :    Social Connections:    • Frequency of Communication with Friends and Family:    • Frequency of Social Gatherings with Friends and Family:    • Attends Mosque Services:    • Active Member of Clubs or Organizations:    • Attends Club or Organization Meetings:    • Marital Status:    Intimate Partner Violence:    • Fear of Current or Ex-Partner:    • Emotionally Abused:    • Physically Abused:    • Sexually Abused:         Occupation:  working until 12am to 2am     CURRENT MEDICATIONS  Current Outpatient Medications   Medication Sig Dispense Refill   • zonisamide (ZONEGRAN) 100 MG Cap Take 4 Capsules by mouth every day. Take 100 in AM/300 in  Capsule 5   • cloBAZam 20 MG Tab Take 30 mg by mouth at bedtime for 90 days. 45 Tablet 2   • Cholecalciferol (VITAMIN D) 125 MCG (5000 UT) Cap Take 5,000 Units by mouth every day for 360 days. 90 capsule 3   • venlafaxine (EFFEXOR) 50 MG tablet Take 1 tablet by mouth 3 times a day. 90 tablet 3   • folic acid (FOLVITE) 1 MG Tab Take 1 tablet by mouth every day. 90 tablet 3   • zolpidem (AMBIEN) 5 MG Tab  (Patient not taking: Reported on 9/30/2021)     • clobetasol (TEMOVATE) 0.05 % external solution Apply daily to affected areas of the scalp for up to 3 weeks, then stop 1 week. Repeat as needed. 60 mL 3   • ketoconazole (NIZORAL) 2 % shampoo Apply to affected area on the skin in the shower. Leave on 5-10 minutes prior to rinsing off. 120 mL 6     No current facility-administered medications for this visit.       REVIEW OF  "SYSTEMS  Constitutional: Denies fevers, Denies weight changes  Eyes: Denies changes in vision, no eye pain  Ears/Nose/Throat/Mouth: Denies nasal congestion or sore throat   Cardiovascular: Denies chest pain or palpitations   Respiratory: Denies shortness of breath, Denies cough  Gastrointestinal/Hepatic: Denies abdominal pain, nausea, vomiting  Musculoskeletal/Rheum: Denies  joint pain and swelling   Skin/Breast: Denies rash   Neurological: Denies headache, confusion, memory loss or focal weakness/parasthesias  I    Physical Examination:  Vitals/ General Appearance:   Weight/BMI: Body mass index is 37.77 kg/m².  /62 (BP Location: Left arm, Patient Position: Sitting, BP Cuff Size: Adult)   Pulse 75   Resp 14   Ht 1.499 m (4' 11\")   Wt 84.8 kg (187 lb)   SpO2 98%   Vitals:    09/30/21 0944   BP: 102/62   BP Location: Left arm   Patient Position: Sitting   BP Cuff Size: Adult   Pulse: 75   Resp: 14   SpO2: 98%   Weight: 84.8 kg (187 lb)   Height: 1.499 m (4' 11\")       Pt. is alert and oriented to time, place and person. Cooperative and in no apparent distress.     Constitutional: Alert, no distress, well-groomed.  Skin: No rashes in visible areas.  Eye: Round. Conjunctiva clear, lids normal. No icterus.   ENT EXAM  Nasal alae/valves collapsible: No   Nasal septum deviation: No   Nasal turbinate hypertrophy: Left: Grade 2   Right: Grade 2  Hard palate narrow: Yes  Hard palate high: Yes  Soft palate/uvula (Mallampati score): 4  Tongue Scalloping: Yes  Retrognathia: No   Micrognathia: No   Cardiovascular:no murmus/gallops/rubs, normal S1 and S2 heart sounds, regular rate and rhythm  Pulmonary:Clear to auscultation, No wheezes, No crackles.  Neurologic:Awake, alert and oriented x 3, Normal age appropriate gait, No involuntary motions.  Extremities: No clubbing, cyanosis, or edema       ASSESSMENT AND PLAN   Orin Rodas is a 26 y.o. female with epilepsy, depression, obesity, chronic insomnia, headaches " and daytime sleepiness.  Presents to Sleep Clinic for evaluation of nocturnal seizures.    1.  Nocturnal seizure   She had 1 instance of seizure from awakening in early morning.  She has known history of epilepsy.  She is continue to follow-up neurology to control her seizures.  There is the possibility of nocturnal seizures.  Also in addition there is concern for sleep deprivation and interruption of sleep which may lower her seizure threshold.      Plan  -Order placed for PSG with full EEG  -She was given Ambien for sleep study by PCP    2. Orin Rodas  has symptoms of Obstructive Sleep Apnea (EDGARD). Orin Rodas has symptoms of snoring, choking/gasping during sleep, witnessed apnea, dry mouth, morning headaches, unrefreshed upon awakening. These interfere with activities of daily living.   ESS 15  Pt has risk factors for EDGARD include obesity,  and crowded oropharynx.       The pathophysiology of EDGARD and the increased risk of cardiovascular morbidity from untreated EDGARD is discussed in detail with the patient. She  also has epilepsy which can be worsened by EDGARD.      We have discussed diagnostic options including in-laboratory, attended polysomnography and home sleep testing. We have also discussed treatment options including airway pressurization, reconstructive otolaryngologic surgery, dental appliances and weight management.     Subsequently,treatment options will be discussed based on the diagnostic study. Meanwhile, She is urged to avoid supine sleep, weight gain and alcoholic beverages since all of these can worsen EDGARD. She is cautioned against drowsy driving. If She feels sleepy while driving, advised must pull over for a break/nap, rather than persist on the road, in the interest of Pt's own safety and that of others on the road.    Plan  -  She  will be scheduled for an overnight PSG to assess sleep related breathing disorder.  - Follow up 1-2 weeks after sleep study to discuss results and  treatment options moving forward   -Advised to reach out via MyChart or by phone with any questions or concerns.     2. Chronic Insomnia   With prolonged sleep latency, frequent awakenings with difficulty falling back asleep and feeling this is impacting daily functioning for 1 year meets criteria for chronic insomnia. Discussed potential causes of insomnia and importance of having a routine around bedtime. Advised to avoid laying in bed for more then 20-30 min if unable to fall asleep. Dicussed cognitive behavioral therapy for insomnia (CBTi) which is first line treatment for insomnia. Recommended pt participate in self directed CBTi as would likely benefit. Reviewed pharmacologic therapy which is second line treatment and may be considered if circumstances permit.   Sleep phase is delayed which may be adding to her insomnia.  Insomnia can also be worsened by untreated EDGARD at present.    RECOMMENDATIONS  -Reference material given to patient regarding CBTi   -We also discussed a few interventions noted below to help with the insomnia:  -Maintain a regular sleep schedule  -Avoid caffeinated beverages after lunch, and alcohol in late afternoon and evening  -Avoid prolonged use of light-emitting screens before bedtime  -Exercise regularly for at least 20 minutes, preferably more than four to five hours prior to bedtime  -Avoid daytime naps, especially if they are longer than 20 to 30 minutes or occur late in the day    3. Regarding treatment of other past medical problems and general health maintenance,  Pt is urged to follow up with PCP.

## 2021-09-30 NOTE — PATIENT INSTRUCTIONS
Sleep Apnea  Sleep apnea affects breathing during sleep. It causes breathing to stop for a short time or to become shallow. It can also increase the risk of:  · Heart attack.  · Stroke.  · Being very overweight (obese).  · Diabetes.  · Heart failure.  · Irregular heartbeat.  The goal of treatment is to help you breathe normally again.  What are the causes?  There are three kinds of sleep apnea:  · Obstructive sleep apnea. This is caused by a blocked or collapsed airway.  · Central sleep apnea. This happens when the brain does not send the right signals to the muscles that control breathing.  · Mixed sleep apnea. This is a combination of obstructive and central sleep apnea.  The most common cause of this condition is a collapsed or blocked airway. This can happen if:  · Your throat muscles are too relaxed.  · Your tongue and tonsils are too large.  · You are overweight.  · Your airway is too small.  What increases the risk?  · Being overweight.  · Smoking.  · Having a small airway.  · Being older.  · Being male.  · Drinking alcohol.  · Taking medicines to calm yourself (sedatives or tranquilizers).  · Having family members with the condition.  What are the signs or symptoms?  · Trouble staying asleep.  · Being sleepy or tired during the day.  · Getting angry a lot.  · Loud snoring.  · Headaches in the morning.  · Not being able to focus your mind (concentrate).  · Forgetting things.  · Less interest in sex.  · Mood swings.  · Personality changes.  · Feelings of sadness (depression).  · Waking up a lot during the night to pee (urinate).  · Dry mouth.  · Sore throat.  How is this diagnosed?  · Your medical history.  · A physical exam.  · A test that is done when you are sleeping (sleep study). The test is most often done in a sleep lab but may also be done at home.  How is this treated?    · Sleeping on your side.  · Using a medicine to get rid of mucus in your nose (decongestant).  · Avoiding the use of alcohol,  medicines to help you relax, or certain pain medicines (narcotics).  · Losing weight, if needed.  · Changing your diet.  · Not smoking.  · Using a machine to open your airway while you sleep, such as:  ? An oral appliance. This is a mouthpiece that shifts your lower jaw forward.  ? A CPAP device. This device blows air through a mask when you breathe out (exhale).  ? An EPAP device. This has valves that you put in each nostril.  ? A BPAP device. This device blows air through a mask when you breathe in (inhale) and breathe out.  · Having surgery if other treatments do not work.  It is important to get treatment for sleep apnea. Without treatment, it can lead to:  · High blood pressure.  · Coronary artery disease.  · In men, not being able to have an erection (impotence).  · Reduced thinking ability.  Follow these instructions at home:  Lifestyle  · Make changes that your doctor recommends.  · Eat a healthy diet.  · Lose weight if needed.  · Avoid alcohol, medicines to help you relax, and some pain medicines.  · Do not use any products that contain nicotine or tobacco, such as cigarettes, e-cigarettes, and chewing tobacco. If you need help quitting, ask your doctor.  General instructions  · Take over-the-counter and prescription medicines only as told by your doctor.  · If you were given a machine to use while you sleep, use it only as told by your doctor.  · If you are having surgery, make sure to tell your doctor you have sleep apnea. You may need to bring your device with you.  · Keep all follow-up visits as told by your doctor. This is important.  Contact a doctor if:  · The machine that you were given to use during sleep bothers you or does not seem to be working.  · You do not get better.  · You get worse.  Get help right away if:  · Your chest hurts.  · You have trouble breathing in enough air.  · You have an uncomfortable feeling in your back, arms, or stomach.  · You have trouble talking.  · One side of your  body feels weak.  · A part of your face is hanging down.  These symptoms may be an emergency. Do not wait to see if the symptoms will go away. Get medical help right away. Call your local emergency services (911 in the [place] Do not drive yourself to the hospital.  Summary  · This condition affects breathing during sleep.  · The most common cause is a collapsed or blocked airway.  · The goal of treatment is to help you breathe normally while you sleep.  This information is not intended to replace advice given to you by your health care provider. Make sure you discuss any questions you have with your health care provider.  Document Released: 09/26/2009 Document Revised: 10/04/2019 Document Reviewed: 08/13/2019  [employer] Patient Education © 2020 [employer]vier Inc.        It was a pleasure meeting you today. Here are a list of resources for self-guided CTBI.    CBT-I Books  Mary Mind: Turn Off Your Noisy Thoughts and Get a Good Night's Sleep - Valery Prado, Phd and Shari Vazquez, Phd  Accessible, enjoyable, and grounded in evidence-based cognitive behavioral therapy (CBT), Mary Mind directly addresses the effects of rumination?or having an overactive brain?on your ability to sleep well. Written by two psychologists who specialize in sleep disorders, the book contains helpful exercises and insights into how you can better manage your thoughts at bedtime, and finally get some sleep.    Insomnia Solved: A Self-Directed Cognitive Behavioral Therapy for Insomnia (CBTI) Program - Curt Sewell MD  Cognitive behavioral therapy for insomnia (CBTI) is often structured as a 6-week treatment program that can help people who have difficulty falling asleep, staying asleep, or find that sleep is unrefreshing. CBTI is scientifically proven, highly effective, and does not rely on medications. CBTI has life-long benefits and most participants report improved sleep satisfaction. Insomnia Solved is based on the core features of  "this treatment.    Quiet Your Mind and Get to Sleep: Solutions to Insomnia for Those with Depression, Anxiety or Chronic Pain - Shari Vazquez, PhD  This workbook uses cognitive behavior therapy, which has been shown to work as well as sleep medications and produce longer-lasting effects. Research shows that it also works well for those whose insomnia is experienced in the context of anxiety, depression, and chronic pain. The complete program in this book goes to the root of your insomnia and offers the same techniques used by experienced sleep specialists.    \"Say Mary to Insomnia\" by Jamie Hartmann     \"No More Sleepless Nights\" by Andrés Smith    CBT-I Online Resources  Path to Better Sleep (free) - https://www.veterantraining.va.gov/insomnia/index.asp   This free online Cognitive Behavioral Therapy for Insomnia course, which was developed for veterans, takes you through a sleep \"check-in\" and the various components of CBT-I, including modifying unhelpful behaviors and unhelpful thoughts around sleep.    Insomnia Solved - Na Sewell MD ($79) - http://www.naMalibuIQ.Yappn/fix-my-insomnia/   Insomnia Solved is a self-guided CBTI program created by Na Sewell M.D., a board-certified medical doctor, that is priced inexpensively at $79. The complete program includes full access to exclusive multimedia content, including the 154-page eBook and online modules and audiofiles.    [employer] [place] - Frances Reddy, PhD, [facility] ($129) - https://[employer]/   Sleepfitness is an insomnia program based on Cognitive Behavioral Treatment for Insomnia (CBTi) and is a 6-week self-guided online format. IT costs $219 for a 1-year subscription. You can sign up for the 7-day free trial and learn how CBTi can improve your sleep.    Apps  Insomnia  (free)   Offers a self-guided 5-week training plan designed to change sleep habits.  https://Stringbike.va.gov/enrique/insomnia-      "

## 2021-10-25 ENCOUNTER — APPOINTMENT (OUTPATIENT)
Dept: SLEEP MEDICINE | Facility: MEDICAL CENTER | Age: 26
End: 2021-10-25
Attending: STUDENT IN AN ORGANIZED HEALTH CARE EDUCATION/TRAINING PROGRAM

## 2021-11-07 DIAGNOSIS — F32.89 OTHER DEPRESSION: ICD-10-CM

## 2021-11-07 DIAGNOSIS — F41.9 ANXIETY: ICD-10-CM

## 2021-11-08 RX ORDER — VENLAFAXINE 50 MG/1
TABLET ORAL
Qty: 90 TABLET | Refills: 3 | Status: SHIPPED | OUTPATIENT
Start: 2021-11-08 | End: 2022-07-06 | Stop reason: SDUPTHER

## 2021-11-11 ENCOUNTER — TELEPHONE (OUTPATIENT)
Dept: SLEEP MEDICINE | Facility: MEDICAL CENTER | Age: 26
End: 2021-11-11

## 2021-11-11 ENCOUNTER — APPOINTMENT (OUTPATIENT)
Dept: SLEEP MEDICINE | Facility: MEDICAL CENTER | Age: 26
End: 2021-11-11

## 2021-11-11 NOTE — TELEPHONE ENCOUNTER
I called patient as she was on my schedule today for sleep results but the testing had not been completed. I called patient and she states her appointment at the sleep center on 10/25/21 did not happen as the electricity was OUT THAT NIGHT and someone was going to call her and reschedule but nobody did.     I have rescheduled her for 12/6/21 with follow up results on 12/21/21 for results. Patient needs directions to to the sleep center and I wanted to make sure the insurance authorization and orders are all in place. Message routed to [facility] for verification.

## 2021-11-19 ENCOUNTER — HOSPITAL ENCOUNTER (EMERGENCY)
Facility: MEDICAL CENTER | Age: 26
End: 2021-11-19
Attending: EMERGENCY MEDICINE
Payer: COMMERCIAL

## 2021-11-19 VITALS
OXYGEN SATURATION: 98 % | HEART RATE: 73 BPM | RESPIRATION RATE: 18 BRPM | BODY MASS INDEX: 37.7 KG/M2 | HEIGHT: 59 IN | SYSTOLIC BLOOD PRESSURE: 127 MMHG | DIASTOLIC BLOOD PRESSURE: 75 MMHG | WEIGHT: 187 LBS | TEMPERATURE: 98 F

## 2021-11-19 DIAGNOSIS — J02.9 SORE THROAT: Primary | ICD-10-CM

## 2021-11-19 DIAGNOSIS — R05.9 COUGH: ICD-10-CM

## 2021-11-19 LAB
FLUAV RNA SPEC QL NAA+PROBE: NEGATIVE
FLUBV RNA SPEC QL NAA+PROBE: NEGATIVE
RSV RNA SPEC QL NAA+PROBE: NEGATIVE
S PYO DNA SPEC NAA+PROBE: NOT DETECTED
SARS-COV-2 RNA RESP QL NAA+PROBE: NOTDETECTED
SPECIMEN SOURCE: NORMAL

## 2021-11-19 PROCEDURE — C9803 HOPD COVID-19 SPEC COLLECT: HCPCS | Performed by: EMERGENCY MEDICINE

## 2021-11-19 PROCEDURE — 99283 EMERGENCY DEPT VISIT LOW MDM: CPT

## 2021-11-19 PROCEDURE — 87651 STREP A DNA AMP PROBE: CPT

## 2021-11-19 PROCEDURE — 0241U HCHG SARS-COV-2 COVID-19 NFCT DS RESP RNA 4 TRGT MIC: CPT

## 2021-11-19 ASSESSMENT — PAIN DESCRIPTION - PAIN TYPE: TYPE: ACUTE PAIN

## 2021-11-19 ASSESSMENT — ENCOUNTER SYMPTOMS
CHILLS: 0
SORE THROAT: 1
DIZZINESS: 0
PALPITATIONS: 0
SHORTNESS OF BREATH: 0
FEVER: 0
ROS GI COMMENTS: LOOSE STOOLS.
NAUSEA: 0
HEADACHES: 0
VOMITING: 0

## 2021-11-19 ASSESSMENT — FIBROSIS 4 INDEX: FIB4 SCORE: 0.28

## 2021-11-19 NOTE — ED NOTES
Pt discharged in stable condition.  Pt instructed to follow up with PCP, return to the ER if symptoms worsen.

## 2021-11-19 NOTE — ED PROVIDER NOTES
"ED Provider Note   11/19/2021  1:14 AM    Means of Arrival: Walk In  History obtained by: patient  Limitations: None    CHIEF COMPLAINT  Chief Complaint   Patient presents with   • Sore Throat     woke up with it this morning. \"People say I have white stuff on my tonsils\".    • Cough     dry cough that began this morning   • Chest Pain     believes it is associated with the cough, denies SOB, light headedness or dizziness       HPI  Orin Rodas is a 26 y.o. female with history of epilepsy who presents with concerns of cough, sore throat that started today. She has been vaccinated for COVID19. No known sick contacts. Cough is dry. She thought she could see \"white stuff\" on the back of her throat. No trouble swallowing or breathing. She is having some chest pain when coughing. She has not taken any medication for symptoms because she is worried they may interact with the effectiveness of her epilepsy medications.     REVIEW OF SYSTEMS  Review of Systems   Constitutional: Negative for chills, fever and malaise/fatigue.   HENT: Positive for sore throat. Negative for congestion.    Respiratory: Negative for shortness of breath.    Cardiovascular: Negative for palpitations and leg swelling.   Gastrointestinal: Negative for nausea and vomiting.        Loose stools.    Neurological: Negative for dizziness and headaches.     See HPI for further details.     PAST MEDICAL HISTORY   has a past medical history of Depression, Epilepsy (HCC), and Seizure (HCC).    FAMILY HISTORY  Family History   Problem Relation Age of Onset   • Diabetes Maternal Grandmother        SOCIAL HISTORY  Social History     Tobacco Use   • Smoking status: Former Smoker     Types: Cigarettes   • Smokeless tobacco: Never Used   Vaping Use   • Vaping Use: Some days   • Substances: Flavoring   Substance and Sexual Activity   • Alcohol use: No     Alcohol/week: 0.0 oz   • Drug use: No   • Sexual activity: Yes       SURGICAL HISTORY  patient denies any " "surgical history    CURRENT MEDICATIONS  Home Medications    **Home medications have not yet been reviewed for this encounter**         ALLERGIES  No Known Allergies    PHYSICAL EXAM  VITAL SIGNS: /75   Pulse 73   Temp 36.7 °C (98 °F) (Temporal)   Resp 18   Ht 1.499 m (4' 11\")   Wt 84.8 kg (187 lb)   SpO2 98%   BMI 37.77 kg/m²    Pulse ox interpretation: I interpret this pulse ox as normal.  Constitutional: Alert in no apparent distress.  HENT: Normocephalic, Atraumatic, Bilateral external ears normal. Nose normal. Posterior pharynx erythema but no obvious exudate. No lymphadenopathy at neck.   Eyes: Pupils are equal and reactive. Conjunctiva normal, non-icteric.   Heart: Regular rate and rythm, no murmurs.    Lungs: No respiratory distress, regular respirations. Clear to auscultation bilaterally.  Abdomen: Normal appearance, nondistended, nontender.  Skin: Warm, Dry, No erythema, No rash.   Neurologic: Alert, Grossly non-focal. No slurred speech. Moving extremities normally.   MSK:Full range of motion of extremities without limitations.   Psychiatric: Affect normal, Judgment normal, Mood normal, Appears appropriate and not intoxicated.   Physical Exam      COURSE & MEDICAL DECISION MAKING  Pertinent Labs & Imaging studies reviewed. (See chart for details)    1:14 AM This is an emergent evaluation of a 26 y.o., female who presents with cough, sore throat . Physical exam significant for posterior pharynx erythema and dry cough. The differential diagnosis includes but is not limited to strep pharyngitis, viral syndrome, covid 19. Ordered for viral testing and strep test to evaluate. She declined any treatment for symptoms. .     2:38 AM  Strep test negative. RSV, FLU, COVID negative. She is hesitant to take any medication for pain due to taking multiple seizure medications.       The patient will return for worsening symptoms and is stable at the time of discharge. The patient verbalizes understanding. " Guidance was provided on appropriate use of medications including driving under the influence, overdose, and side effects.     FINAL IMPRESSION  1. Sore throat    2. Cough           This dictation was created using voice recognition software. The accuracy of the dictation is limited to the abilities of the software. I expect there may be some errors of grammar and possibly content. The nursing notes were reviewed and certain aspects of this information were incorporated into this note.    Electronically signed by: Gurjit Mishra II, M.D., 11/19/2021 1:14 AM

## 2021-12-06 ENCOUNTER — SLEEP STUDY (OUTPATIENT)
Dept: SLEEP MEDICINE | Facility: MEDICAL CENTER | Age: 26
End: 2021-12-06
Attending: STUDENT IN AN ORGANIZED HEALTH CARE EDUCATION/TRAINING PROGRAM
Payer: COMMERCIAL

## 2021-12-06 DIAGNOSIS — G47.00 FREQUENT NOCTURNAL AWAKENING: ICD-10-CM

## 2021-12-06 DIAGNOSIS — R56.9 NOCTURNAL SEIZURES (HCC): ICD-10-CM

## 2021-12-06 PROCEDURE — 95811 POLYSOM 6/>YRS CPAP 4/> PARM: CPT | Performed by: STUDENT IN AN ORGANIZED HEALTH CARE EDUCATION/TRAINING PROGRAM

## 2021-12-08 NOTE — PROCEDURES
MONTAGE: Standard  STUDY TYPE: Split Night    RECORDING TECHNIQUE:   After the scalp was prepared, gold plated electrodes were applied to the scalp according to the International 10-20 System. EEG (electroencephalogram) was continuously monitored from the O1-M2, O2-M1, C3-M2, C4-M1, F3-M2, and F4-M1. EOGs (electrooculograms) were monitored by electrodes placed at the left and right outer canthi. Chin EMG (electromyogram) was monitored by electrodes placed on the mentalis and sub-mentalis muscles. Nasal and oral airflow were monitored using a triple port thermocouple as well as oronasal pressure transducer. Respiratory effort was measured by inductive plethysmography technology employing abdominal and thoracic belts. Blood oxygen saturation and pulse were monitored by pulse oximetry. Heart rhythm was monitored by surface electrocardiogram. Leg EMG was studied using surface electrodes placed on left and right anterior tibialis. A microphone was used to monitor tracheal sounds and snoring. Body position was monitored and documented by technician observation.     SCORING CRITERIA:   A modification of the AASM manual for scoring of sleep and associated events was used. Obstructive apneas were scored by cessation of airflow for at least 10 seconds with continuing respiratory effort. Central apneas were scored by cessation of airflow for at least 10 seconds with no respiratory effort. Hypopneas were scored by a 30% or more reduction in airflow for at least 10 seconds accompanied by arterial oxygen desaturation of 3% or an arousal. For CMS (Medicare) patients, per AASM rule 1B, hypopneas are scored by 30% with mild reduction in airflow for at least 10 seconds accompanied by arterial saturation decreased at 4%.    DIAGNOSTIC  Study start time was 10:56:55 PM. Diagnostic recording time was 182 minutes with a total sleep time of 140 minutes resulting in a sleep efficiency of 76.99%%. Sleep latency from the start of the study  was 35 minutes and the latency from sleep to REM was 00 minutes. In total,49 arousals were scored for an arousal index of 20.9. No REM sleep seen during Diagnostic.  Respiratory:  There were a total of 0 apneas consisting of 0 obstructive apneas, 0 mixed apneas, and 0 central apneas. A total of 42 hypopneas were scored. The apnea index was 0.00 per hour and the hypopnea index was 17.94 per hour resulting in an overall AHI of 17.94. AHI during rem was 0.0 and AHI while supine was 23.86.  Oximetry:  There was a mean oxygen saturation of 94.0%. The minimum oxygen saturation during NREM sleep was87.0% and in REM was --. Time spent during sleep with oxygen saturations <88% was 0.1 minutes.   Cardiac:  The highest heart rate seen while awake was 112 BPM while the highest heart rate during sleep was 102 BPM with an average sleeping heart rate of 79 BPM.  Limb Movements:  There were a total of 0 PLMs during sleep, which resulted in a PLM index of 0.0. There were 3 PLMs associated with arousals which resulted in a PLMS arousal index of 1.3.    TREATMENT:  Treatment recording time was 4h 19.0m (259 minutes) with a total sleep time of 3h 35.5m (215 minutes) resulting in a sleep efficiency of 83.2%. Sleep latency from the start of treatment was 09 minutes and REM latency from sleep onset was 2h 39.5m. The patient had 71 arousals in total for an arousal index of 19.8.  Respiratory:   There were 0 apneas in total consisting of 0 obstructive apneas, 0 central apneas, and 0 mixed apneas for an apnea index of 0.00. The patient had 24 hypopneas in total, which resulted in a hypopnea index of 6.68. The overall AHI was 6.68, with a REM AHI of 0.00, and a supine AHI of 15.33. No supine REM sleep was seen on PAP.   Oximetry:  The mean SaO2 during treatment was 94.0%. The minimum oxygen saturation in NREM was88.0 % and in REM was 94.0%. Patient spent 0.9 minutes of TST with SaO2 <88%.  Cardiac:  The highest heart rate during sleep was 102  BPM with an average sleeping heart rate of 79BPM.  Limb Movements:  There were a total of 0 PLMS during titration sleep time that resulted in an index of 0.0. There were 3 PLMS associated with arousals. This resulted in a PLM arousal index of 0.8.  Titration:   CPAP was tried from 5 to 8cm H2O.  This was a fully attended sleep study. This test was technically adequate. This patient was titrated on CPAP starting at 5 cm of water pressure. Patient was titrated up to 8 cm of water pressure. Patient did best at 5 cm of water pressure. Patient spent 104 minutes at that pressure and their AHI was 2.31 which is considered treated obstructive sleep apnea.     Impression:  1.  Moderate Obstructive Sleep Apnea with an AHI 17.94  2.  Adequate Response to PAP   3.  No EEG signs of seizure activity seen    Recommendations:  I recommend Auto CPAP of 5 to 8 cm with AirFit F30 mask.   No supplemental oxygen needed.  I also recommend 30 day compliance download to assess the efficacy to the recommended pressure, measure leak, apnea hypopnea index and compliance for further outpatient monitoring and management of CPAP therapy. In some cases alternative treatment options may be proven effective in resolving sleep apnea. These options include upper airway surgery, the use of a dental orthotic, weight loss orpositional therapy. Clinical correlation is required. In general patients with sleep apnea are advised to avoid alcohol, sedatives and not to operate a motor vehicle while drowsy.  Untreated sleep apnea increases the risk for cardiovascular and neurovascular disease.

## 2021-12-09 PROBLEM — G47.33 OBSTRUCTIVE SLEEP APNEA SYNDROME: Status: ACTIVE | Noted: 2021-12-09

## 2021-12-21 ENCOUNTER — OFFICE VISIT (OUTPATIENT)
Dept: SLEEP MEDICINE | Facility: MEDICAL CENTER | Age: 26
End: 2021-12-21
Payer: COMMERCIAL

## 2021-12-21 VITALS
HEIGHT: 59 IN | RESPIRATION RATE: 16 BRPM | DIASTOLIC BLOOD PRESSURE: 68 MMHG | OXYGEN SATURATION: 98 % | WEIGHT: 189 LBS | HEART RATE: 62 BPM | SYSTOLIC BLOOD PRESSURE: 106 MMHG | BODY MASS INDEX: 38.1 KG/M2

## 2021-12-21 DIAGNOSIS — G47.19 EXCESSIVE DAYTIME SLEEPINESS: ICD-10-CM

## 2021-12-21 DIAGNOSIS — G47.33 OBSTRUCTIVE SLEEP APNEA: ICD-10-CM

## 2021-12-21 DIAGNOSIS — R56.9 SEIZURE (HCC): ICD-10-CM

## 2021-12-21 PROCEDURE — 99213 OFFICE O/P EST LOW 20 MIN: CPT | Performed by: STUDENT IN AN ORGANIZED HEALTH CARE EDUCATION/TRAINING PROGRAM

## 2021-12-21 ASSESSMENT — FIBROSIS 4 INDEX: FIB4 SCORE: 0.28

## 2021-12-21 NOTE — PATIENT INSTRUCTIONS
"CPAP and BPAP Information  CPAP and BPAP are methods of helping a person breathe with the use of air pressure. CPAP stands for \"continuous positive airway pressure.\" BPAP stands for \"bi-level positive airway pressure.\" In both methods, air is blown through your nose or mouth and into your air passages to help you breathe well.  CPAP and BPAP use different amounts of pressure to blow air. With CPAP, the amount of pressure stays the same while you breathe in and out. With BPAP, the amount of pressure is increased when you breathe in (inhale) so that you can take larger breaths. Your health care provider will recommend whether CPAP or BPAP would be more helpful for you.  Why are CPAP and BPAP treatments used?  CPAP or BPAP can be helpful if you have:  · Sleep apnea.  · Chronic obstructive pulmonary disease (COPD).  · Heart failure.  · Medical conditions that weaken the muscles of the chest including muscular dystrophy, or neurological diseases such as amyotrophic lateral sclerosis (ALS).  · Other problems that cause breathing to be weak, abnormal, or difficult.  CPAP is most commonly used for obstructive sleep apnea (EDGARD) to keep the airways from collapsing when the muscles relax during sleep.  How is CPAP or BPAP administered?  Both CPAP and BPAP are provided by a small machine with a flexible plastic tube that attaches to a plastic mask. You wear the mask. Air is blown through the mask into your nose or mouth. The amount of pressure that is used to blow the air can be adjusted on the machine. Your health care provider will determine the pressure setting that should be used based on your individual needs.  When should CPAP or BPAP be used?  In most cases, the mask only needs to be worn during sleep. Generally, the mask needs to be worn throughout the night and during any daytime naps. People with certain medical conditions may also need to wear the mask at other times when they are awake. Follow instructions from your " health care provider about when to use the machine.  What are some tips for using the mask?    · Because the mask needs to be snug, some people feel trapped or closed-in (claustrophobic) when first using the mask. If you feel this way, you may need to get used to the mask. One way to do this is by holding the mask loosely over your nose or mouth and then gradually applying the mask more snugly. You can also gradually increase the amount of time that you use the mask.  · Masks are available in various types and sizes. Some fit over your mouth and nose while others fit over just your nose. If your mask does not fit well, talk with your health care provider about getting a different one.  · If you are using a mask that fits over your nose and you tend to breathe through your mouth, a chin strap may be applied to help keep your mouth closed.  · The CPAP and BPAP machines have alarms that may sound if the mask comes off or develops a leak.  · If you have trouble with the mask, it is very important that you talk with your health care provider about finding a way to make the mask easier to tolerate. Do not stop using the mask. Stopping the use of the mask could have a negative impact on your health.  What are some tips for using the machine?  · Place your CPAP or BPAP machine on a secure table or stand near an electrical outlet.  · Know where the on/off switch is located on the machine.  · Follow instructions from your health care provider about how to set the pressure on your machine and when you should use it.  · Do not eat or drink while the CPAP or BPAP machine is on. Food or fluids could get pushed into your lungs by the pressure of the CPAP or BPAP.  · Do not smoke. Tobacco smoke residue can damage the machine.  · For home use, CPAP and BPAP machines can be rented or purchased through home health care companies. Many different brands of machines are available. Renting a machine before purchasing may help you find out  which particular machine works well for you.  · Keep the CPAP or BPAP machine and attachments clean. Ask your health care provider for specific instructions.  Get help right away if:  · You have redness or open areas around your nose or mouth where the mask fits.  · You have trouble using the CPAP or BPAP machine.  · You cannot tolerate wearing the CPAP or BPAP mask.  · You have pain, discomfort, and bloating in your abdomen.  Summary  · CPAP and BPAP are methods of helping a person breathe with the use of air pressure.  · Both CPAP and BPAP are provided by a small machine with a flexible plastic tube that attaches to a plastic mask.  · If you have trouble with the mask, it is very important that you talk with your health care provider about finding a way to make the mask easier to tolerate.  This information is not intended to replace advice given to you by your health care provider. Make sure you discuss any questions you have with your health care provider.  Document Released: 09/15/2005 Document Revised: 04/08/2020 Document Reviewed: 11/06/2017  Elsevier Patient Education © 2020 [employer]

## 2021-12-21 NOTE — PROGRESS NOTES
[facility] Follow-up Visit    CC: Follow-up to discuss sleep study results      HPI:  Orin Rodas is a 26 y.o.female  with epilepsy, depression, obesity, chronic insomnia, headaches, daytime sleepiness and obstructive sleep apnea.  Presents to sleep clinic to follow-up recent sleep study results.    She reports she continues to have daytime sleepiness and low energy.  In addition she has continued to have seizures which were not fully controlled.  She is following regularly with neurology.  She continues to work nights as a  working until midnight to 2 AM and generally getting home and falling asleep by 4 AM to 5 AM.  She has not napping during the day and she is not currently driving.      Patient Active Problem List    Diagnosis Date Noted   • Obstructive sleep apnea syndrome 12/09/2021   • History of UTI 09/16/2021   • Depression 09/16/2021   • Hair loss 02/03/2021   • Headache 02/03/2021   • COVID-19 virus infection 11/03/2020   • Hyperlipidemia 11/03/2020   • Seizure (HCC) 07/31/2020   • Hyperglycemia 07/31/2020   • Overweight 07/31/2020   • Cervicalgia 07/31/2020   • Bilateral low back pain without sciatica 07/28/2015       Past Medical History:   Diagnosis Date   • Depression    • Epilepsy (HCC)    • Seizure (HCC)     since October        No past surgical history on file.    Family History   Problem Relation Age of Onset   • Diabetes Maternal Grandmother        Social History     Socioeconomic History   • Marital status: Single     Spouse name: Not on file   • Number of children: Not on file   • Years of education: Not on file   • Highest education level: Not on file   Occupational History   • Not on file   Tobacco Use   • Smoking status: Former Smoker     Types: Cigarettes   • Smokeless tobacco: Never Used   Vaping Use   • Vaping Use: Former   • Substances: Flavoring   Substance and Sexual Activity   • Alcohol use: No     Alcohol/week: 0.0 oz   • Drug use: No   • Sexual activity:  Yes   Other Topics Concern   • Not on file   Social History Narrative    ** Merged History Encounter **         [employer] -senior    Lives with Mom, sisters, brother             Social Determinants of Health     Financial Resource Strain:    • Difficulty of Paying Living Expenses: Not on file   Food Insecurity:    • Worried About Running Out of Food in the Last Year: Not on file   • Ran Out of Food in the Last Year: Not on file   Transportation Needs:    • Lack of Transportation (Medical): Not on file   • Lack of Transportation (Non-Medical): Not on file   Physical Activity:    • Days of Exercise per Week: Not on file   • Minutes of Exercise per Session: Not on file   Stress:    • Feeling of Stress : Not on file   Social Connections:    • Frequency of Communication with Friends and Family: Not on file   • Frequency of Social Gatherings with Friends and Family: Not on file   • Attends Synagogue Services: Not on file   • Active Member of Clubs or Organizations: Not on file   • Attends Club or Organization Meetings: Not on file   • Marital Status: Not on file   Intimate Partner Violence:    • Fear of Current or Ex-Partner: Not on file   • Emotionally Abused: Not on file   • Physically Abused: Not on file   • Sexually Abused: Not on file   Housing Stability:    • Unable to Pay for Housing in the Last Year: Not on file   • Number of Places Lived in the Last Year: Not on file   • Unstable Housing in the Last Year: Not on file       Current Outpatient Medications   Medication Sig Dispense Refill   • venlafaxine (EFFEXOR) 50 MG tablet TAKE 1 TABLET BY MOUTH THREE TIMES DAILY 90 Tablet 3   • zonisamide (ZONEGRAN) 100 MG Cap Take 4 Capsules by mouth every day. Take 100 in AM/300 in  Capsule 5   • clobetasol (TEMOVATE) 0.05 % external solution Apply daily to affected areas of the scalp for up to 3 weeks, then stop 1 week. Repeat as needed. 60 mL 3   • folic acid (FOLVITE) 1 MG Tab Take 1 tablet by mouth every day. 90  "tablet 3   • zolpidem (AMBIEN) 5 MG Tab  (Patient not taking: Reported on 9/30/2021)     • Cholecalciferol (VITAMIN D) 125 MCG (5000 UT) Cap Take 5,000 Units by mouth every day for 360 days. (Patient not taking: Reported on 12/21/2021) 90 capsule 3   • ketoconazole (NIZORAL) 2 % shampoo Apply to affected area on the skin in the shower. Leave on 5-10 minutes prior to rinsing off. (Patient not taking: Reported on 12/21/2021) 120 mL 6     No current facility-administered medications for this visit.        ALLERGIES: Patient has no known allergies.    ROS  Constitutional: Denies fever, chills, sweats,  weight loss, fatigue  Cardiovascular: Denies chest pain, tightness, palpitations, swelling in legs/feet  Respiratory: Denies shortness of breath, cough, sputum, wheezing, painful breathing   Sleep: per HPI  Gastrointestinal: Denies  difficulty swallowing, nausea, abdominal pain, diarrhea, constipation, heartburn.  Musculoskeletal: Denies painful joints, sore muscles,       PHYSICAL EXAM  /68 (BP Location: Left arm, Patient Position: Sitting)   Pulse 62   Resp 16   Ht 1.499 m (4' 11\")   Wt 85.7 kg (189 lb)   SpO2 98%   BMI 38.17 kg/m²   Appearance: Well-nourished, well-developed, no acute distress  Eyes:  No scleral icterus , EOMI  ENMT: masked  Musculoskeletal:  Grossly normal; gait and station normal; digits and nails normal  Skin:  No rashes, petechiae, cyanosis  Neurologic: without focal signs; oriented to person, time, place, and purpose; judgement intact  Psychiatric:  No depression, anxiety, agitation      Medical Decision Making   Assessment and Plan  Orin Rodas is a 26 y.o.female  with epilepsy, depression, obesity, chronic insomnia, headaches, daytime sleepiness and obstructive sleep apnea.  Presents to sleep clinic to follow-up recent sleep study results.    The medical record was reviewed.    Obstructive sleep apnea   Reviewed recent PSG split-night study with patient showing an AHI of 17.9 " and Min Oxygen saturation of 87%.  Time below 88% saturation of 0.1 minutes  Based on sleep study and symptoms meets criteria for moderate obstructive sleep apnea.   We discussed the pathophysiology of obstructive sleep apnea (EDGARD) and risk factors for the disease. We also discussed possible consequences of untreated EDGARD, including excessive daytime sleepiness and fatigue, cognitive dysfunction, cardiovascular complications such as elevated blood pressure, heart attacks, cardiac arrhythmias, and strokes. We discussed how EDGARD typically gets worse with age. We discussed treatment options for EDGARD, including the gold standard therapy (PAP), alternative options such as a mandibular advancement device (custom-made oral appliances) and surgeries. We will proceed CPAP therapy.     Discussed that with her daytime sleepiness as well as seizure disorder would be pertinent to treat her obstructive sleep apnea.  Her daytime sleepiness may be a result of untreated sleep apnea in addition poor sleep and interrupted sleep can lead to sleep deprivation which can lower seizure threshold.    RECOMMENDATIONS  -Start Auto CPAP at pressures 4-7 cm H2O  -Discussed importance of adherence/compliance   -Prescription generated for supplies   -Patient counseled to avoid driving when sleepy. Encouraged to anticipate sleepiness, consider taking a 10 min nap prior to driving, alternate with another , or pull over if sleepy while driving  -Advised to contact our office or myself with any questions via My[facility]  -Follow up in 4 months or sooner if needed      Positive airway pressure will favorably impact many of the adverse conditions and effects provoked by EDGARD.    Have advised the patient to follow up with the appropriate healthcare practitioners for all other medical problems and issues.    Return in about 4 months (around 4/21/2022).      Please note portions of this record was created using voice recognition software. I have made every  reasonable attempt to correct obvious errors, but I expect that there are errors of grammar and possibly content I did not discover before finalizing the note.

## 2022-01-26 NOTE — ED NOTES
FSBS checked, resulted at 90. MD aware. Pt given box meal and juice.    Tried calling mom back. Her vm is full.

## 2022-02-08 DIAGNOSIS — Z00.00 HEALTHCARE MAINTENANCE: ICD-10-CM

## 2022-02-08 DIAGNOSIS — R56.9 SEIZURE (HCC): Chronic | ICD-10-CM

## 2022-02-09 RX ORDER — FOLIC ACID 1 MG/1
1 TABLET ORAL DAILY
Qty: 90 TABLET | Refills: 3 | Status: SHIPPED | OUTPATIENT
Start: 2022-02-09 | End: 2022-07-06 | Stop reason: SDUPTHER

## 2022-02-17 ENCOUNTER — APPOINTMENT (OUTPATIENT)
Dept: NEUROLOGY | Facility: MEDICAL CENTER | Age: 27
End: 2022-02-17
Attending: STUDENT IN AN ORGANIZED HEALTH CARE EDUCATION/TRAINING PROGRAM
Payer: COMMERCIAL

## 2022-03-10 DIAGNOSIS — R56.9 SEIZURE (HCC): ICD-10-CM

## 2022-03-11 NOTE — TELEPHONE ENCOUNTER
Received request via: Pharmacy    Was the patient seen in the last year in this department? Yes    [facility]   9/9/21  [facility]   5/12/22    Does the patient have an active prescription (recently filled or refills available) for medication(s) requested? YES

## 2022-03-13 RX ORDER — CLOBAZAM 20 MG/1
TABLET ORAL
Qty: 45 TABLET | Refills: 2 | Status: SHIPPED | OUTPATIENT
Start: 2022-03-13 | End: 2022-06-11

## 2022-04-01 ENCOUNTER — OFFICE VISIT (OUTPATIENT)
Dept: NEUROLOGY | Facility: MEDICAL CENTER | Age: 27
End: 2022-04-01
Attending: STUDENT IN AN ORGANIZED HEALTH CARE EDUCATION/TRAINING PROGRAM
Payer: COMMERCIAL

## 2022-04-01 VITALS
TEMPERATURE: 98.1 F | HEIGHT: 59 IN | WEIGHT: 198 LBS | DIASTOLIC BLOOD PRESSURE: 68 MMHG | HEART RATE: 68 BPM | RESPIRATION RATE: 16 BRPM | BODY MASS INDEX: 39.92 KG/M2 | SYSTOLIC BLOOD PRESSURE: 108 MMHG | OXYGEN SATURATION: 97 %

## 2022-04-01 DIAGNOSIS — G47.00 INSOMNIA, UNSPECIFIED TYPE: ICD-10-CM

## 2022-04-01 DIAGNOSIS — R56.9 SEIZURE (HCC): ICD-10-CM

## 2022-04-01 DIAGNOSIS — R79.89 LOW VITAMIN D LEVEL: ICD-10-CM

## 2022-04-01 DIAGNOSIS — G93.89 CEREBRAL VENTRICULOMEGALY: ICD-10-CM

## 2022-04-01 DIAGNOSIS — F32.89 OTHER DEPRESSION: ICD-10-CM

## 2022-04-01 DIAGNOSIS — G93.2 INTRACRANIAL HYPERTENSION: ICD-10-CM

## 2022-04-01 DIAGNOSIS — M54.2 CERVICALGIA: ICD-10-CM

## 2022-04-01 DIAGNOSIS — G47.33 OBSTRUCTIVE SLEEP APNEA SYNDROME: ICD-10-CM

## 2022-04-01 DIAGNOSIS — F32.A DEPRESSION, UNSPECIFIED DEPRESSION TYPE: ICD-10-CM

## 2022-04-01 PROCEDURE — 99212 OFFICE O/P EST SF 10 MIN: CPT | Performed by: STUDENT IN AN ORGANIZED HEALTH CARE EDUCATION/TRAINING PROGRAM

## 2022-04-01 PROCEDURE — 99215 OFFICE O/P EST HI 40 MIN: CPT | Performed by: STUDENT IN AN ORGANIZED HEALTH CARE EDUCATION/TRAINING PROGRAM

## 2022-04-01 RX ORDER — MIRTAZAPINE 15 MG/1
15 TABLET, FILM COATED ORAL NIGHTLY
Qty: 30 TABLET | Refills: 5 | Status: SHIPPED | OUTPATIENT
Start: 2022-04-01 | End: 2022-11-28

## 2022-04-01 ASSESSMENT — PATIENT HEALTH QUESTIONNAIRE - PHQ9
5. POOR APPETITE OR OVEREATING: 2 - MORE THAN HALF THE DAYS
SUM OF ALL RESPONSES TO PHQ QUESTIONS 1-9: 14
CLINICAL INTERPRETATION OF PHQ2 SCORE: 2

## 2022-04-01 ASSESSMENT — PAIN SCALES - GENERAL: PAINLEVEL: NO PAIN

## 2022-04-01 ASSESSMENT — FIBROSIS 4 INDEX: FIB4 SCORE: 0.28

## 2022-04-01 NOTE — PROGRESS NOTES
NEUROLOGY CLINIC FOLLOW-UP - 04/01/2022   REFERRING PROVIDER  No referring provider defined for this encounter.    PCP  Ed Patel   [phone]   [facility] [[id number]]     REASON FOR VISIT: Orin Rodas 26 y.o. female presents today for follow-up. He was last seen by me 3/10/2022       INTERVAL HISTORY:  GTC back to back, several this past weekend. Last one before that was back in December.    Has EDGARD and wearing CPAP which thinks it is helping. She often pulls it off unconsciously during the night. Thinks it has helped with headaches. She is no longer getting headaches.    Has appointment with sleep doctor.    She says that she gets no sleep.    Not taking vitamin D but will start    Has not seen eye doctor yet.     Slowly coming out of depression. Has a new job which she likes and is going on walks more.      Patient's PMH, PSH, FH, and SH were reviewed.    ROS: All review of systems complete and are negative except as documented    CURRENT MEDICATIONS AT THE TIME OF THIS ENCOUNTER:    Current Outpatient Medications:   •  mirtazapine, 15 mg, Oral, Nightly  •  cloBAZam, TAKE 1 AND 1/2 TABLETS BY MOUTH AT BEDTIME, Taking  •  folic acid, 1 mg, Oral, DAILY, Taking  •  venlafaxine, TAKE 1 TABLET BY MOUTH THREE TIMES DAILY, Taking  •  zonisamide, 400 mg, Oral, DAILY, Taking  •  clobetasol, Apply daily to affected areas of the scalp for up to 3 weeks, then stop 1 week. Repeat as needed., Taking  •  Vitamin D, 5,000 Units, Oral, DAILY (Patient not taking: No sig reported), Not Taking     EXAM:   Encounter Vitals  Standard Vitals     Pulmonary-Specific Vitals     Durable Medical Equipment-Specific Vitals          Physical Exam:  Physical Exam  Constitutional:       General: She is not in acute distress.  HENT:      Head: Normocephalic.   Neurological:      Mental Status: She is alert.   Psychiatric:         Speech: Speech normal.        Neurological Exam   Mental  status:  orientation: Awake, alert and oriented to self, month, year, situation.  Attention: Intact  Speech and language: speech is clear and fluent. The patient is able to name, repeat and comprehend. No word substitions or paraphasic errors  Memory: There is intact recollection of recent and remote events.   Cranial nerve exam:   I: smell Not tested   II: visual acuity  NT   II: Fundoscpic Fundus poorly visualized bilaterally, but possible mild papilledema appreciate.   II: visual fields Full to confrontation  Visual neglect: absent   II: pupils L>R poor/sluggish reactivity. No APD   III,VII: ptosis None   III,IV,VI: extraocular muscles  EOMI   V: mastication Normal   V: facial light touch sensation  Normal   V,VII: corneal reflex  Not tested   VII: facial muscle function - upper  Normal   VII: facial muscle function - lower Normal   VIII: hearing Not tested   IX: soft palate elevation  Normal   IX,X: gag reflex Not tested   XI: trapezius strength  NT   XI: sternocleidomastoid strength NT   XI: neck flexion strength  NT   XII: tongue  Protrudes Midline      Motor exam:   · Grossly normal  Sensory exam reveals normal sense of light touch, proprioception, vibration and pinprick in all extremities. Cortical sensory testing: Normal. Test of neglect: none present to simultaneous stimulation with touch  Deep tendon reflexes:  2+ throughout. Plantar responses are mute  .There is no clonus.   Coordination: shows a normal finger-nose-finger.  Heel-knee-shin movements smooth and coordinated bilaterally.   Gait:   · The patient was able to get up from seated position on first attempt without requiring assistance.   · Found to be steady when walking.   · Movements were fluid with normal arm swing.   · The patient was able to turn without difficulties or tendency to fall.   · Romberg exam absent         ALL DATA (I.e. labs, procedures, imaging, reports, clinical notes, etc.) FROM [facility] AND/OR OUTSIDE SOURCES, IF AVAILABLE,  PERSONALLY REVIEWED:       ASSESSMENT, EDUCATION, AND COUNSELING:  This is a 26 y.o. female patient who presents to the neurology clinic. We had an extensive discussion about the patient's symptoms, signs, and work-up to date, if any. We discussed potential and/or definitive diagnoses, work-up, and potential treatments.       PLAN:  If applicable, the work-up such as labs, imaging, procedures, and/or other testing, referrals, and/or recommended treatment strategies are listed below.    Visit Diagnoses     ICD-10-CM   1. Obstructive sleep apnea syndrome  G47.33   2. Other depression  F32.89   3. Intracranial hypertension  G93.2   4. Cervicalgia  M54.2   5. Low vitamin D level  R79.89   6. Cerebral ventriculomegaly  G93.89   7. Seizure (HCC)  R56.9   8. Depression, unspecified depression type  F32.A   9. Insomnia, unspecified type  G47.00        Orders Placed This Encounter   • Referral to Neurodiagnostics (EEG,EP,EMG/NCS/DBS)   • mirtazapine (REMERON) 15 MG Tab      Not taking vitamin D but will start. Will plan to recheck next visit.  Order for repeat EMU admission. She continues to have seizures which we need to characterize in order to direct management.   She will f/u with sleep medicine next week  She will make an appointment with ophthalmology ASAP given her worsening vision  Headaches improved with consist use of CPAP  Starting mirtazapine to help with sleep and depression        Follow-up:   • 4-6 months        BILLING DOCUMENTATION:     I spent a total of 40 minutes of face-to-face time in this visit. Over 50% of the time of the visit today was spent on counseling and/or coordination of care wtih the patient and/or family, as above in assessment in plan.    Pablo Johansen MD  Epilepsy and General Neurology  Department of Neurology  Clinical  of Neurology [facility] [employer].

## 2022-04-01 NOTE — PATIENT INSTRUCTIONS
Neurology Clinic Visit     IMPORTANT: If imaging, procedure(s), AND/or referrals were placed for you:  Contact [facility] Scheduling if you have not heard from them in 5 day: [phone]    Outpatient [facility] Imaging Sites.  • [address]  Mon-Fri 8am-5pm   • [address] (Breast Center) Mon-Fri 7am-4pm    • [address]  Mon-Fri 8am-5pm  • [facility] Radiology 7am-6:30pm  • [address] Mon-Fri 8am-7pm  Sat 9am-6pm   • [address]  Mon-Fri 8am-7pm and Sat/Sun 9am-5pm  • [address]  Mon-Fri 8am-5pm  • [address] (PET/CT)  Mon-Fri 8:30am-4:30pm     If labs were ordered for you:  • To Schedule an appointment for lab services, please call [phone] or visit [email or web address].  • [facility] Lab Services Convenient Locations:    [place]: • [address] (Ground Floor)  • [address] (Admitting Entrance)  • [address], [address]  • [address], [address]  • [address] [facility], [address]  • [address] [facility] [address]  • [address] [place]: • [address]  • [address]       [place]: • [address]  • [address]     [facility]     [address] [address]     Laboratory Hours: 6:30am - 6pm  Monday - Friday,   7am - 2pm Saturday    [facility]      [address] [address]      Laboratory Hours:  7:30am - 4pm  Monday - Friday,  9am - 3pm Saturday    [facility]     [address]    [address] [place] [address]      Laboratory Hours:  7:00am - 5:30pm  Monday - Friday    [facility] and Urgent Care      [address] [address]       Laboratory Hours:  7:30am - 4:30pm  Monday - Friday    [facility] and [facility]gent Care       [address] [place] [address]       Laboratory Hours:  8am - 5pm  Monday - Friday    [facility] and [facility]       [address] [place] [address]        Laboratory hours:  7:30am - 4pm  Monday - Friday    GENERAL REMINDERS:  · Request refills 1 week in advance to ensure you do not run out of medications  · All diagnostic study results will be reviewed at your next visit, UNLESS there are urgent results that need to be acted on sooner.  · Please remember that it is the your responsibility to check with your Insurance for benefit coverage for visit / visits.  · 24 hours notice is required for all appointment changes or cancellation.  · Please arrive 20 min. before your appointment time  · Please note that because Dr. Jhoansen has high daily clinic volume, he is unable to accommodate late arrivals. If you are more than 15 minutes late for the scheduled appointment you will be asked to reschedule  · Please bring the following with you:  1) Picture ID  2) Insurance card  3) An updated list of ALL your medications and their dosages (prescribed medications, over the counter medications, and all supplements), as well as allergies with you at all times  4) A list of questions, concerns, comments that you wish to discuss with Dr. Eleno Johansen MD  General Neurologist and Epileptologist  Department of Neurology  Instructor of Clinical Neurology [facility].

## 2022-05-11 DIAGNOSIS — G44.89 OTHER HEADACHE SYNDROME: ICD-10-CM

## 2022-05-11 DIAGNOSIS — R56.9 SEIZURE (HCC): ICD-10-CM

## 2022-05-11 RX ORDER — ZONISAMIDE 100 MG
CAPSULE ORAL
Qty: 120 CAPSULE | Refills: 5 | Status: SHIPPED | OUTPATIENT
Start: 2022-05-11 | End: 2022-05-16 | Stop reason: SDUPTHER

## 2022-05-15 ENCOUNTER — HOSPITAL ENCOUNTER (EMERGENCY)
Facility: MEDICAL CENTER | Age: 27
End: 2022-05-16
Attending: EMERGENCY MEDICINE
Payer: COMMERCIAL

## 2022-05-15 DIAGNOSIS — G44.89 OTHER HEADACHE SYNDROME: ICD-10-CM

## 2022-05-15 DIAGNOSIS — R56.9 SEIZURE (HCC): ICD-10-CM

## 2022-05-15 PROCEDURE — 99283 EMERGENCY DEPT VISIT LOW MDM: CPT

## 2022-05-15 ASSESSMENT — FIBROSIS 4 INDEX: FIB4 SCORE: 0.28

## 2022-05-16 ENCOUNTER — TELEPHONE (OUTPATIENT)
Dept: NEUROLOGY | Facility: MEDICAL CENTER | Age: 27
End: 2022-05-16

## 2022-05-16 VITALS
DIASTOLIC BLOOD PRESSURE: 60 MMHG | SYSTOLIC BLOOD PRESSURE: 107 MMHG | HEART RATE: 89 BPM | RESPIRATION RATE: 16 BRPM | WEIGHT: 221.34 LBS | HEIGHT: 60 IN | BODY MASS INDEX: 43.46 KG/M2 | OXYGEN SATURATION: 97 % | TEMPERATURE: 98.5 F

## 2022-05-16 DIAGNOSIS — G44.89 OTHER HEADACHE SYNDROME: ICD-10-CM

## 2022-05-16 DIAGNOSIS — R56.9 SEIZURE (HCC): ICD-10-CM

## 2022-05-16 PROCEDURE — A9270 NON-COVERED ITEM OR SERVICE: HCPCS | Performed by: EMERGENCY MEDICINE

## 2022-05-16 PROCEDURE — 700102 HCHG RX REV CODE 250 W/ 637 OVERRIDE(OP): Performed by: EMERGENCY MEDICINE

## 2022-05-16 RX ORDER — ZONISAMIDE 100 MG
CAPSULE ORAL
Qty: 120 CAPSULE | Refills: 5 | Status: SHIPPED
Start: 2022-05-16 | End: 2022-07-06

## 2022-05-16 RX ORDER — ZONISAMIDE 100 MG/1
CAPSULE ORAL
Qty: 120 CAPSULE | Refills: 2 | Status: SHIPPED | OUTPATIENT
Start: 2022-05-16 | End: 2022-09-07 | Stop reason: SDUPTHER

## 2022-05-16 RX ORDER — ZONISAMIDE 50 MG/1
300 CAPSULE ORAL ONCE
Status: COMPLETED | OUTPATIENT
Start: 2022-05-16 | End: 2022-05-16

## 2022-05-16 RX ADMIN — ZONISAMIDE 300 MG: 50 CAPSULE ORAL at 01:20

## 2022-05-16 NOTE — ED TRIAGE NOTES
Chief Complaint   Patient presents with   • Seizure     Pt 5 on Wednesday and 6 tonight per family. Pt states that she has not been able to fill her prescription due to issues with the script from her neurologist.      BP (!) 132/90   Pulse 92   Temp 36 °C (96.8 °F) (Temporal)   Resp 18   Ht 1.524 m (5')   Wt 100 kg (221 lb 5.5 oz)   SpO2 95%

## 2022-05-16 NOTE — DISCHARGE INSTRUCTIONS
You are seen in the emergency department for seizures after running out of your medications.  You have been given a dose in the emergency department and a medication refill has been sent to the pharmacy.    Please follow-up with your primary care doctor as well as your neurologist.    These return to the emergency department or seek medical attention if you develop:  Ongoing seizures, severe progressive headaches, confusion, any other new or concerning findings

## 2022-05-16 NOTE — TELEPHONE ENCOUNTER
Faxed over correspondence to [facility] that shows that PA for Zonegran was attempted on 5/12/22 and covermymeds stated no PA NEEDED. And that brand name is on formulary. Pharmacy was told to disp to pt ASAP.

## 2022-05-16 NOTE — ED NOTES
Pt to the ED today for evaluation of recurrent sz today x6. Pt has a new medication to try for sz but has been unable to fill due to insurance approval.

## 2022-05-16 NOTE — ED NOTES
DC instructions reviewed. Verbalizes understanding and denies further need at this time. Ambulated to exit with a steady unassisted gait. NAD

## 2022-05-16 NOTE — ED PROVIDER NOTES
ED Provider Note        Means of Arrival: Private Vehicle  History obtained from: Patient, medical record    CHIEF COMPLAINT  Chief Complaint   Patient presents with   • Seizure     Pt 5 on Wednesday and 6 tonight per family. Pt states that she has not been able to fill her prescription due to issues with the script from her neurologist.        HPI  Orin Rodas is a 26 y.o. female who presents with seizures.  The patient is on zonisamide for seizure disorder, however the generic was recently recalled and there has been problems with her getting the brand-name given issues with her insurance.  She has not been able to obtain an appointment with her neurologist for follow-up until August.  She had a seizure 5 days ago, and then had an episode of cluster seizures tonight just before arrival with 6 seizures within an hour.  She reports biting her tongue.  She feels fatigued and achy but no other complaints.  She denies any headache.  She does report some transient right-sided paralysis after seizures but reports that this is normal for her to have transient Andres's paralysis.  She reports that this has resolved.         REVIEW OF SYSTEMS    CONSTITUTIONAL:  No fever.  CARDIOVASCULAR:  No chest discomfort.  RESPIRATORY:  No pleuritic chest pain.  GASTROINTESTINAL:  No abdominal pain.    See HPI for further details.       PAST MEDICAL HISTORY  Past Medical History:   Diagnosis Date   • Depression    • Epilepsy (HCC)    • Seizure (HCC)     since October       FAMILY HISTORY  Family History   Problem Relation Age of Onset   • Diabetes Maternal Grandmother        SOCIAL HISTORY   reports that she has quit smoking. Her smoking use included cigarettes. She has never used smokeless tobacco. She reports that she does not drink alcohol and does not use drugs.    SURGICAL HISTORY  History reviewed. No pertinent surgical history.    CURRENT MEDICATIONS  Home Medications    **Home medications have not yet been reviewed for  this encounter**         ALLERGIES  No Known Allergies    PHYSICAL EXAM  VITAL SIGNS: /67   Pulse 95   Temp 36 °C (96.8 °F) (Temporal)   Resp 18   Ht 1.524 m (5')   Wt 100 kg (221 lb 5.5 oz)   LMP 05/09/2022 (Exact Date)   SpO2 97%   BMI 43.23 kg/m²    Gen: alert, no acute distress  HENT: ATNC  Eyes: normal conjunctiva  Resp: No respiratory distress.   CV: No JVD, RRR  Abd: Non-distended  Extremities: No deformity  Neuro: Alert and oriented. Cranial nerves II through XII intact, 5+ strength in all extremities, sensation intact in all extremities, speech fluent,  cerebellar function intact.      COURSE & MEDICAL DECISION MAKING  Pertinent Labs & Imaging studies reviewed. (See chart for details)    Patient presents with seizure in the setting of running out of her seizure medication.  She is agreeable with plan for refilling her medications and giving her a dose here.  She demonstrates no focal neurologic deficits to suggest intracranial bleed, intracranial mass, other dangerous conditions.  Low suspicion for meningitis.    Appropriate PPE were worn during this encounter.    FINAL IMPRESSION  1. Seizure (HCC)    2. Other headache syndrome         DISPOSITION:  Patient will be discharged home in stable condition.    FOLLOW UP:  Ed Patel M.D.  [address] [facility] [address] [phone]    Schedule an appointment as soon as possible for a visit       [facility], Emergency Dept  [address]  [place] [address] [phone]    If symptoms worsen      OUTPATIENT MEDICATIONS:  New Prescriptions    ZONISAMIDE (ZONEGRAN) 100 MG CAP    Take 1 capsule in the morning and 3 capsules at bedtime          This dictation was created using voice recognition software. The accuracy of the dictation is limited to the abilities of the software. I expect there may be some errors of grammar and possibly content. The nursing notes were reviewed and certain aspects of this  information were incorporated into this note.

## 2022-05-17 DIAGNOSIS — R56.9 SEIZURE (HCC): ICD-10-CM

## 2022-07-06 ENCOUNTER — TELEPHONE (OUTPATIENT)
Dept: MEDICAL GROUP | Facility: PHYSICIAN GROUP | Age: 27
End: 2022-07-06

## 2022-07-06 DIAGNOSIS — Z00.00 HEALTHCARE MAINTENANCE: ICD-10-CM

## 2022-07-06 DIAGNOSIS — F32.89 OTHER DEPRESSION: ICD-10-CM

## 2022-07-06 DIAGNOSIS — R56.9 SEIZURE (HCC): ICD-10-CM

## 2022-07-06 DIAGNOSIS — F41.9 ANXIETY: ICD-10-CM

## 2022-07-06 DIAGNOSIS — G44.89 OTHER HEADACHE SYNDROME: ICD-10-CM

## 2022-07-06 RX ORDER — VENLAFAXINE 50 MG/1
50 TABLET ORAL 3 TIMES DAILY
Qty: 90 TABLET | Refills: 3 | Status: SHIPPED | OUTPATIENT
Start: 2022-07-06 | End: 2023-05-26

## 2022-07-06 RX ORDER — CLOBAZAM 20 MG/1
TABLET ORAL
Qty: 45 TABLET | Refills: 0 | Status: SHIPPED | OUTPATIENT
Start: 2022-07-06 | End: 2022-09-07 | Stop reason: SDUPTHER

## 2022-07-06 RX ORDER — ZONISAMIDE 100 MG
CAPSULE ORAL
Qty: 120 CAPSULE | Refills: 5 | Status: SHIPPED | OUTPATIENT
Start: 2022-07-06 | End: 2023-04-10

## 2022-07-06 RX ORDER — FOLIC ACID 1 MG/1
1 TABLET ORAL DAILY
Qty: 90 TABLET | Refills: 3 | Status: SHIPPED | OUTPATIENT
Start: 2022-07-06 | End: 2023-09-12 | Stop reason: SDUPTHER

## 2022-07-06 NOTE — TELEPHONE ENCOUNTER
Caller Name: Orin Rodas    Call Back Number: [phone] (home)       How would the patient prefer to be contacted with a response: Phone call do NOT leave a detailed message    Patient called and states she cannot get a hold of her neurologist. She would like to know if you could refill her neurology medications. Folic acid and clobazam 20mg. Please advise.        Chart note:    7/6/2022 at 6:10 PM     Pt's name: Orin Rodas [phone] (home)    /  PCP: Ed Patel M.D..    -This provider contact the patient by phone today    Patient stated that she is presently taking clobazam 20 mg take 1.5 tablet at bedtime for seizure disorder.    Patient is stated that her neurologist is presently out of town and she is in need of her prescription refill.  I have informed the patient that this is a controlled substance medication.  I have reviewed the .  I will go ahead and order 30-day supply for the patient.  Strongly recommend for the patient to contact her neurologist for future refills.  Patient voiced understanding and stated that she has an appointment to see the neurologist in a couple weeks.                Ed Patel M.D.

## 2022-08-17 ENCOUNTER — OFFICE VISIT (OUTPATIENT)
Dept: URGENT CARE | Facility: PHYSICIAN GROUP | Age: 27
End: 2022-08-17

## 2022-08-17 VITALS
BODY MASS INDEX: 36.71 KG/M2 | WEIGHT: 187 LBS | RESPIRATION RATE: 16 BRPM | OXYGEN SATURATION: 96 % | HEIGHT: 60 IN | HEART RATE: 97 BPM | SYSTOLIC BLOOD PRESSURE: 110 MMHG | TEMPERATURE: 98.7 F | DIASTOLIC BLOOD PRESSURE: 70 MMHG

## 2022-08-17 DIAGNOSIS — J02.0 PHARYNGITIS DUE TO STREPTOCOCCUS SPECIES: ICD-10-CM

## 2022-08-17 DIAGNOSIS — J03.90 TONSILLITIS: ICD-10-CM

## 2022-08-17 DIAGNOSIS — K05.00 ACUTE GINGIVAL INFLAMMATION: ICD-10-CM

## 2022-08-17 LAB
INT CON NEG: NEGATIVE
INT CON POS: POSITIVE
S PYO AG THROAT QL: POSITIVE

## 2022-08-17 PROCEDURE — 99213 OFFICE O/P EST LOW 20 MIN: CPT | Performed by: PHYSICIAN ASSISTANT

## 2022-08-17 RX ORDER — DEXAMETHASONE SODIUM PHOSPHATE 10 MG/ML
10 INJECTION INTRAMUSCULAR; INTRAVENOUS ONCE
Status: COMPLETED | OUTPATIENT
Start: 2022-08-17 | End: 2022-08-17

## 2022-08-17 RX ORDER — AMOXICILLIN AND CLAVULANATE POTASSIUM 875; 125 MG/1; MG/1
1 TABLET, FILM COATED ORAL 2 TIMES DAILY
Qty: 20 TABLET | Refills: 0 | Status: SHIPPED | OUTPATIENT
Start: 2022-08-17 | End: 2022-08-22

## 2022-08-17 RX ORDER — AMOXICILLIN AND CLAVULANATE POTASSIUM 875; 125 MG/1; MG/1
1 TABLET, FILM COATED ORAL 2 TIMES DAILY
Qty: 20 TABLET | Refills: 0 | Status: SHIPPED | OUTPATIENT
Start: 2022-08-17 | End: 2022-08-17

## 2022-08-17 RX ADMIN — DEXAMETHASONE SODIUM PHOSPHATE 10 MG: 10 INJECTION INTRAMUSCULAR; INTRAVENOUS at 14:20

## 2022-08-17 ASSESSMENT — FIBROSIS 4 INDEX: FIB4 SCORE: 0.29

## 2022-08-17 NOTE — LETTER
August 17, 2022         Patient: Orin Rodas   YOB: 1995   Date of Visit: 8/17/2022           To Whom it May Concern:    Orin Rodas was seen in my clinic on 8/17/2022.  Please excuse her absence from work for the remainder of this week for medical reasons.  She may return to work on 8/22/2022.    If you have any questions or concerns, please don't hesitate to call.        Sincerely,           Jeimy Mullins P.A.-C.  Electronically Signed

## 2022-08-22 ENCOUNTER — OFFICE VISIT (OUTPATIENT)
Dept: URGENT CARE | Facility: PHYSICIAN GROUP | Age: 27
End: 2022-08-22

## 2022-08-22 ENCOUNTER — APPOINTMENT (OUTPATIENT)
Dept: NEUROLOGY | Facility: MEDICAL CENTER | Age: 27
End: 2022-08-22
Attending: STUDENT IN AN ORGANIZED HEALTH CARE EDUCATION/TRAINING PROGRAM
Payer: COMMERCIAL

## 2022-08-22 VITALS
OXYGEN SATURATION: 96 % | SYSTOLIC BLOOD PRESSURE: 118 MMHG | BODY MASS INDEX: 39.34 KG/M2 | DIASTOLIC BLOOD PRESSURE: 66 MMHG | RESPIRATION RATE: 20 BRPM | HEART RATE: 120 BPM | WEIGHT: 200.38 LBS | TEMPERATURE: 98 F | HEIGHT: 60 IN

## 2022-08-22 DIAGNOSIS — K12.0 APHTHOUS ULCER: ICD-10-CM

## 2022-08-22 DIAGNOSIS — J03.90 TONSILLITIS: ICD-10-CM

## 2022-08-22 DIAGNOSIS — J02.0 PHARYNGITIS DUE TO STREPTOCOCCUS SPECIES: ICD-10-CM

## 2022-08-22 LAB
INT CON NEG: NEGATIVE
INT CON POS: POSITIVE
S PYO AG THROAT QL: POSITIVE

## 2022-08-22 PROCEDURE — 99214 OFFICE O/P EST MOD 30 MIN: CPT | Performed by: PHYSICIAN ASSISTANT

## 2022-08-22 PROCEDURE — 87880 STREP A ASSAY W/OPTIC: CPT | Performed by: PHYSICIAN ASSISTANT

## 2022-08-22 RX ORDER — METHYLPREDNISOLONE 4 MG/1
TABLET ORAL
Qty: 21 TABLET | Refills: 0 | Status: SHIPPED | OUTPATIENT
Start: 2022-08-22 | End: 2023-03-15

## 2022-08-22 RX ORDER — CLINDAMYCIN HYDROCHLORIDE 300 MG/1
300 CAPSULE ORAL 3 TIMES DAILY
Qty: 30 CAPSULE | Refills: 0 | Status: SHIPPED | OUTPATIENT
Start: 2022-08-22 | End: 2022-09-01

## 2022-08-22 RX ORDER — LIDOCAINE HYDROCHLORIDE 20 MG/ML
5-15 SOLUTION OROPHARYNGEAL EVERY 4 HOURS PRN
Qty: 100 ML | Refills: 1 | Status: SHIPPED | OUTPATIENT
Start: 2022-08-22 | End: 2023-03-15

## 2022-08-22 ASSESSMENT — ENCOUNTER SYMPTOMS
DIZZINESS: 0
HEADACHES: 1
DIARRHEA: 0
MYALGIAS: 1
CHILLS: 0
VOMITING: 0
SORE THROAT: 1
NAUSEA: 0
FEVER: 0
CHILLS: 0
ABDOMINAL PAIN: 0
VOMITING: 0
COUGH: 0
HEADACHES: 1
COUGH: 0
FEVER: 0
PALPITATIONS: 0
MYALGIAS: 1
SORE THROAT: 1
NAUSEA: 0

## 2022-08-22 ASSESSMENT — FIBROSIS 4 INDEX: FIB4 SCORE: 0.29

## 2022-08-22 NOTE — LETTER
August 22, 2022         Patient: Orin Rodas   YOB: 1995   Date of Visit: 8/22/2022           To Whom it May Concern:    Orin Rodas was seen in my clinic on 8/22/2022. She may return to work on 8/25/2022.    If you have any questions or concerns, please don't hesitate to call.        Sincerely,           Jeimy Mullins P.A.-C.  Electronically Signed

## 2022-08-22 NOTE — PROGRESS NOTES
"Subjective     Orin Rodas is a 27 y.o. female who presents with Pharyngitis (X 5 days not getting better from last visit. ) and Blister (Inside mouth)      HPI:  Orin Rodas is a 27 y.o. female who presents today for evaluation of sore throat.  Patient was initially seen by me on 8/17/2022.  She was diagnosed with strep pharyngitis at that time and was treated with Augmentin as well as some oral Decadron.  She reports that since that visit her sore throat has actually gotten worse.  She was having some gingival swelling which has improved but sore throat has worsened and she now has some \"blisters\" inside her mouth.  She says that her throat has been so painful that she has not been able to eat anything in the past 2 days.  She does not know if she has had fever or not.  She denies any trismus or drooling.  She states that she has been taking the antibiotics as prescribed and has not missed any doses.      Review of Systems   Constitutional:  Positive for malaise/fatigue. Negative for chills and fever.   HENT:  Positive for sore throat.    Respiratory:  Negative for cough.    Gastrointestinal:  Negative for abdominal pain, diarrhea, nausea and vomiting.   Musculoskeletal:  Positive for myalgias.   Neurological:  Positive for headaches.       PMH:  has a past medical history of Depression, Epilepsy (HCC), and Seizure ([facility]).  MEDS:   Current Outpatient Medications:     amoxicillin-clavulanate (AUGMENTIN) 875-125 MG Tab, Take 1 Tablet by mouth 2 times a day for 10 days., Disp: 20 Tablet, Rfl: 0    venlafaxine (EFFEXOR) 50 MG tablet, Take 1 Tablet by mouth 3 times a day., Disp: 90 Tablet, Rfl: 3    ZONEGRAN 100 MG Cap, Take 4 Capsules by mouth every day. Take 100 in AM/300 in PM, Disp: 120 Capsule, Rfl: 5    folic acid (FOLVITE) 1 MG Tab, Take 1 Tablet by mouth every day., Disp: 90 Tablet, Rfl: 3    mirtazapine (REMERON) 15 MG Tab, Take 1 Tablet by mouth every evening for 180 days., Disp: 30 Tablet, " Rfl: 5    zonisamide (ZONEGRAN) 100 MG Cap, Take 1 capsule in the morning and 3 capsules at bedtime (Patient not taking: Reported on 8/22/2022), Disp: 120 Capsule, Rfl: 2  ALLERGIES: No Known Allergies  SURGHX: History reviewed. No pertinent surgical history.  SOCHX:  reports that she quit smoking about 8 months ago. Her smoking use included cigarettes. She has never used smokeless tobacco. She reports that she does not drink alcohol and does not use drugs.  FH: Family history was reviewed, no pertinent findings to report    Objective     /66 (BP Location: Left arm, Patient Position: Sitting, BP Cuff Size: Adult)   Pulse (!) 120   Temp 36.7 °C (98 °F) (Temporal)   Resp 20   Ht 1.524 m (5')   Wt 90.9 kg (200 lb 6 oz)   LMP 07/15/2022   SpO2 96%   Breastfeeding No   BMI 39.13 kg/m²      Physical Exam  Constitutional:       Appearance: She is well-developed.   HENT:      Head: Normocephalic and atraumatic.      Right Ear: Tympanic membrane, ear canal and external ear normal.      Left Ear: Tympanic membrane, ear canal and external ear normal.      Nose: Nose normal.      Mouth/Throat:      Lips: Pink.      Mouth: Mucous membranes are moist.      Pharynx: Uvula midline. Oropharyngeal exudate and posterior oropharyngeal erythema present. No uvula swelling.      Tonsils: Tonsillar exudate present. No tonsillar abscesses. 3+ on the right. 3+ on the left.      Comments: Multiple aphthous ulcers noted on inside of lower lip and buccal mucosa  Eyes:      Conjunctiva/sclera: Conjunctivae normal.      Pupils: Pupils are equal, round, and reactive to light.   Cardiovascular:      Rate and Rhythm: Regular rhythm. Tachycardia present.      Heart sounds: Normal heart sounds. No murmur heard.  Pulmonary:      Effort: Pulmonary effort is normal.      Breath sounds: Normal breath sounds. No wheezing.   Musculoskeletal:      Cervical back: Normal range of motion.   Lymphadenopathy:      Cervical: Cervical adenopathy  present.   Skin:     General: Skin is warm and dry.      Capillary Refill: Capillary refill takes less than 2 seconds.   Neurological:      Mental Status: She is alert and oriented to person, place, and time.   Psychiatric:         Behavior: Behavior normal.         Judgment: Judgment normal.              POCT Rapid Strep A - POSITIVE      Assessment & Plan     1. Tonsillitis  - lidocaine (XYLOCAINE) 2 % Solution; Take 5-15 mL by mouth every four hours as needed for Throat/Mouth Pain.  Dispense: 100 mL; Refill: 1  - clindamycin (CLEOCIN) 300 MG Cap; Take 1 Capsule by mouth 3 times a day for 10 days.  Dispense: 30 Capsule; Refill: 0  - methylPREDNISolone (MEDROL DOSEPAK) 4 MG Tablet Therapy Pack; Follow schedule on package instructions.  Dispense: 21 Tablet; Refill: 0  - POCT Rapid Strep A    2. Pharyngitis due to Streptococcus species  - clindamycin (CLEOCIN) 300 MG Cap; Take 1 Capsule by mouth 3 times a day for 10 days.  Dispense: 30 Capsule; Refill: 0    3. Aphthous ulcer      Patient still strep positive with persistent symptoms.  Tonsils are actually more inflamed on today's exam without any evidence of abscess.  We will have her stop the Augmentin we will switch to clindamycin.  Recommend that she take probiotics while on this medication.  She was also prescribed some viscous lidocaine solution and Medrol Dosepak to help with symptoms.  If no significant improvement in symptoms after 72 hours she should return to the urgent care for reevaluation.  She should go to the ER for any worsening symptoms.                Differential Diagnosis, natural history, and supportive care discussed. Return to the Urgent Care or follow up with your PCP if symptoms fail to resolve, or for any new or worsening symptoms. Emergency room precautions discussed. Patient and/or family appears understanding of information

## 2022-08-22 NOTE — PROGRESS NOTES
Subjective     Orin Rodas is a 27 y.o. female who presents with Jaw Pain (Onset 2 days/), Headache (Onset 2 days/), Pharyngitis (Onset 2 days), and Oral Swelling (Apparent gingiva degradation on front 2 teeth)      Pharyngitis   This is a new problem. The current episode started in the past 7 days (started 2 days ago). The problem has been rapidly worsening. Neither side of throat is experiencing more pain than the other. There has been no fever. The pain is severe. Associated symptoms include headaches, a plugged ear sensation and swollen glands. Pertinent negatives include no coughing, stridor or trouble swallowing. Associated symptoms comments: Some swelling. She has had no exposure to strep or mono. She has tried NSAIDs for the symptoms. The treatment provided mild relief.  Patient also reports that her bottom front gumline started to swell up over the past 24 hours.      Review of Systems   Constitutional:  Positive for malaise/fatigue. Negative for chills and fever.   HENT:  Positive for sore throat.         Mouth pain   Respiratory:  Negative for cough.    Cardiovascular:  Negative for chest pain and palpitations.   Gastrointestinal:  Negative for nausea and vomiting.   Musculoskeletal:  Positive for myalgias.   Neurological:  Positive for headaches. Negative for dizziness.         PMH:  has a past medical history of Depression, Epilepsy (HCC), and Seizure (HCC).  MEDS:   Current Outpatient Medications:     amoxicillin-clavulanate (AUGMENTIN) 875-125 MG Tab, Take 1 Tablet by mouth 2 times a day for 10 days., Disp: 20 Tablet, Rfl: 0    venlafaxine (EFFEXOR) 50 MG tablet, Take 1 Tablet by mouth 3 times a day., Disp: 90 Tablet, Rfl: 3    ZONEGRAN 100 MG Cap, Take 4 Capsules by mouth every day. Take 100 in AM/300 in PM, Disp: 120 Capsule, Rfl: 5    folic acid (FOLVITE) 1 MG Tab, Take 1 Tablet by mouth every day., Disp: 90 Tablet, Rfl: 3    mirtazapine (REMERON) 15 MG Tab, Take 1 Tablet by mouth every  evening for 180 days., Disp: 30 Tablet, Rfl: 5    zonisamide (ZONEGRAN) 100 MG Cap, Take 1 capsule in the morning and 3 capsules at bedtime, Disp: 120 Capsule, Rfl: 2  ALLERGIES: No Known Allergies  SURGHX: History reviewed. No pertinent surgical history.  SOCHX:  reports that she quit smoking about 8 months ago. Her smoking use included cigarettes. She has never used smokeless tobacco. She reports that she does not drink alcohol and does not use drugs.  FH: Family history was reviewed, no pertinent findings to report      Objective     /70 (BP Location: Right arm, Patient Position: Sitting, BP Cuff Size: Large adult)   Pulse 97   Temp 37.1 °C (98.7 °F) (Temporal)   Resp 16   Ht 1.524 m (5')   Wt 84.8 kg (187 lb)   SpO2 96%   BMI 36.52 kg/m²      Physical Exam  Constitutional:       Appearance: She is well-developed.   HENT:      Head: Normocephalic and atraumatic.      Right Ear: Tympanic membrane, ear canal and external ear normal.      Left Ear: Tympanic membrane, ear canal and external ear normal.      Mouth/Throat:      Dentition: Gingival swelling present.      Pharynx: Uvula midline. Oropharyngeal exudate and posterior oropharyngeal erythema present. No uvula swelling.      Tonsils: Tonsillar exudate present. No tonsillar abscesses. 2+ on the right. 2+ on the left.   Eyes:      Conjunctiva/sclera: Conjunctivae normal.      Pupils: Pupils are equal, round, and reactive to light.   Cardiovascular:      Rate and Rhythm: Normal rate and regular rhythm.      Heart sounds: Normal heart sounds. No murmur heard.  Pulmonary:      Effort: Pulmonary effort is normal.      Breath sounds: Normal breath sounds. No wheezing.   Musculoskeletal:      Cervical back: Normal range of motion.   Lymphadenopathy:      Cervical: No cervical adenopathy.   Skin:     General: Skin is warm and dry.      Capillary Refill: Capillary refill takes less than 2 seconds.   Neurological:      Mental Status: She is alert and  oriented to person, place, and time.   Psychiatric:         Behavior: Behavior normal.         Judgment: Judgment normal.       POCT Rapid Strep A - POSITIVE    Assessment & Plan       1. Acute gingival inflammation  - dexamethasone (DECADRON) injection (check route below) 10 mg  - amoxicillin-clavulanate (AUGMENTIN) 875-125 MG Tab; Take 1 Tablet by mouth 2 times a day for 10 days.  Dispense: 20 Tablet; Refill: 0    2. Pharyngitis due to Streptococcus species  - POCT Rapid Strep A  - amoxicillin-clavulanate (AUGMENTIN) 875-125 MG Tab; Take 1 Tablet by mouth 2 times a day for 10 days.  Dispense: 20 Tablet; Refill: 0  -Supportive care discussed to include salt water gargles, throat lozenges, and increased fluid intake    3. Tonsillitis  - POCT Rapid Strep A  - dexamethasone (DECADRON) injection (check route below) 10 mg       The gingival formation is a bit unusual with strep pharyngitis but she may have a concurrent dental infection.  The Augmentin should cover for both.  She was also given 10 mg oral Decadron to help with tonsillar and gingival swelling.  Recommend that she continue to use OTC analgesics.  She is contagious until she has been on antibiotics for least 24 hours.  If no significant improvement in symptoms after 72 hours she may return for reevaluation.          Differential Diagnosis, natural history, and supportive care discussed. Return to the Urgent Care or follow up with your PCP if symptoms fail to resolve, or for any new or worsening symptoms. Emergency room precautions discussed. Patient and/or family appears understanding of information.

## 2022-09-07 DIAGNOSIS — R56.9 SEIZURE (HCC): ICD-10-CM

## 2022-09-07 RX ORDER — CLOBAZAM 20 MG/1
TABLET ORAL
Qty: 45 TABLET | Refills: 2 | Status: SHIPPED | OUTPATIENT
Start: 2022-09-07 | End: 2023-01-06

## 2022-09-07 RX ORDER — ZONISAMIDE 100 MG/1
CAPSULE ORAL
Qty: 120 CAPSULE | Refills: 2 | Status: SHIPPED | OUTPATIENT
Start: 2022-09-07 | End: 2022-12-07

## 2022-11-23 DIAGNOSIS — G47.00 INSOMNIA, UNSPECIFIED TYPE: ICD-10-CM

## 2022-11-23 DIAGNOSIS — F32.A DEPRESSION, UNSPECIFIED DEPRESSION TYPE: ICD-10-CM

## 2022-11-28 RX ORDER — MIRTAZAPINE 15 MG/1
TABLET, FILM COATED ORAL
Qty: 30 TABLET | Refills: 5 | Status: SHIPPED | OUTPATIENT
Start: 2022-11-28 | End: 2023-09-12 | Stop reason: SDUPTHER

## 2022-11-28 NOTE — TELEPHONE ENCOUNTER
Received request via: Pharmacy    Was the patient seen in the last year in this department? No    Does the patient have an active prescription (recently filled or refills available) for medication(s) requested? No    Does the patient have retirement Plus and need 100 day supply (blood pressure, diabetes and cholesterol meds only)? Patient does not have SCP

## 2023-03-15 ENCOUNTER — OFFICE VISIT (OUTPATIENT)
Dept: URGENT CARE | Facility: PHYSICIAN GROUP | Age: 28
End: 2023-03-15
Payer: COMMERCIAL

## 2023-03-15 VITALS
SYSTOLIC BLOOD PRESSURE: 114 MMHG | HEIGHT: 59 IN | BODY MASS INDEX: 42.67 KG/M2 | RESPIRATION RATE: 16 BRPM | HEART RATE: 100 BPM | DIASTOLIC BLOOD PRESSURE: 74 MMHG | TEMPERATURE: 99 F | OXYGEN SATURATION: 99 % | WEIGHT: 211.64 LBS

## 2023-03-15 DIAGNOSIS — J02.0 ACUTE STREPTOCOCCAL PHARYNGITIS: ICD-10-CM

## 2023-03-15 LAB
INT CON NEG: NEGATIVE
INT CON POS: POSITIVE
S PYO AG THROAT QL: POSITIVE

## 2023-03-15 PROCEDURE — 99213 OFFICE O/P EST LOW 20 MIN: CPT | Performed by: PHYSICIAN ASSISTANT

## 2023-03-15 PROCEDURE — 87880 STREP A ASSAY W/OPTIC: CPT | Performed by: PHYSICIAN ASSISTANT

## 2023-03-15 RX ORDER — CEPHALEXIN 500 MG/1
500 CAPSULE ORAL 2 TIMES DAILY
Qty: 20 CAPSULE | Refills: 0 | Status: SHIPPED | OUTPATIENT
Start: 2023-03-15 | End: 2023-03-25

## 2023-03-15 RX ORDER — AMOXICILLIN 500 MG/1
500 CAPSULE ORAL 2 TIMES DAILY
Qty: 20 CAPSULE | Refills: 0 | Status: SHIPPED | OUTPATIENT
Start: 2023-03-15 | End: 2023-03-15

## 2023-03-15 ASSESSMENT — ENCOUNTER SYMPTOMS
SHORTNESS OF BREATH: 0
DIZZINESS: 0
EYE DISCHARGE: 0
EYE PAIN: 0
EYE REDNESS: 0
ABDOMINAL PAIN: 0
CHILLS: 0
NAUSEA: 0
DIARRHEA: 0
SINUS PAIN: 0
COUGH: 1
SORE THROAT: 1
WHEEZING: 0
HEADACHES: 0
VOMITING: 0
DIAPHORESIS: 0
CONSTIPATION: 0
FEVER: 1

## 2023-03-15 ASSESSMENT — FIBROSIS 4 INDEX: FIB4 SCORE: 0.29

## 2023-03-15 NOTE — LETTER
[facility] [address] [address]     March 15, 2023    Patient: Orin Rodas   YOB: 1995   Date of Visit: 3/15/2023       To Whom It May Concern:    Orin Rodas was seen and treated in our department on 3/15/2023.  Please excuse from work on 3/15/2023 and 3/16/2023, can return thereafter    Sincerely,     Dane Cyr P.A.-C.

## 2023-03-15 NOTE — PROGRESS NOTES
"  Subjective:     Orin Rodas  is a 27 y.o. female who presents for Fever (X 2-3 days with neck pain with small cough )       She presents today with fever, pharyngitis and anterior cervical neck pain x2-3 days.  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  Is having pain with swallowing, denies dysphagia.  No chest pain or shortness of breath, no nausea or vomiting, no abdominal pain, no diarrhea.  Is experiencing a mild cough.     Review of Systems   Constitutional:  Positive for fever. Negative for chills, diaphoresis and malaise/fatigue.   HENT:  Positive for sore throat. Negative for congestion, ear discharge and sinus pain.    Eyes:  Negative for pain, discharge and redness.   Respiratory:  Positive for cough. Negative for shortness of breath and wheezing.    Cardiovascular:  Negative for chest pain.   Gastrointestinal:  Negative for abdominal pain, constipation, diarrhea, nausea and vomiting.   Genitourinary:  Negative for dysuria, frequency and urgency.   Neurological:  Negative for dizziness and headaches.    No Known Allergies  Past Medical History:   Diagnosis Date    Depression     Epilepsy ([facility])     Seizure ([facility])     since October        Objective:   /74 (BP Location: Left arm, Patient Position: Sitting, BP Cuff Size: Adult)   Pulse 100   Temp 37.2 °C (99 °F) (Temporal)   Resp 16   Ht 1.499 m (4' 11\")   Wt 96 kg (211 lb 10.3 oz)   SpO2 99%   BMI 42.75 kg/m²   Physical Exam  Vitals and nursing note reviewed.   Constitutional:       General: She is not in acute distress.     Appearance: Normal appearance. She is not ill-appearing, toxic-appearing or diaphoretic.   HENT:      Head: Normocephalic.      Right Ear: Tympanic membrane, ear canal and external ear normal. There is no impacted cerumen.      Left Ear: Tympanic membrane, ear canal and external ear normal. There is no impacted cerumen.      Nose: No congestion or rhinorrhea.      Mouth/Throat:      " Mouth: Mucous membranes are moist.      Pharynx: Posterior oropharyngeal erythema present. No oropharyngeal exudate.      Comments: No soft tissue swelling of the sublingual mucosa, no swelling of the soft or hard palate, no unilarteral oral pharynx swelling, no uvular deviation.  Eyes:      General:         Right eye: No discharge.         Left eye: No discharge.      Conjunctiva/sclera: Conjunctivae normal.   Cardiovascular:      Rate and Rhythm: Normal rate and regular rhythm.   Pulmonary:      Effort: Pulmonary effort is normal. No respiratory distress.      Breath sounds: Normal breath sounds. No stridor. No wheezing or rhonchi.   Musculoskeletal:      Cervical back: Neck supple. No rigidity.   Lymphadenopathy:      Cervical: Cervical adenopathy present.   Neurological:      General: No focal deficit present.      Mental Status: She is alert and oriented to person, place, and time.   Psychiatric:         Mood and Affect: Mood normal.         Behavior: Behavior normal.         Thought Content: Thought content normal.         Judgment: Judgment normal.           Diagnostic testing:    POC strep-positive    Assessment/Plan:     Encounter Diagnoses   Name Primary?    Acute streptococcal pharyngitis           Plan for care for today's complaint includes amoxicillin for strep pharyngitis.  Strep test was positive today.  Did discuss appropriate hygiene techniques to prevent coinfection or reinfection.  Work note was provided.  Continue to monitor symptoms and return to urgent care or follow-up with primary care provider if symptoms remain ongoing.  Follow-up in the emergency department if symptoms become severe, ER precautions discussed in office today..  Prescription for amoxicillin provided.  Rapid strep test was performed today as we do not have PCR strep testing available in the clinic today.      See AVS Instructions below for written guidance provided to patient on after-visit management and care in addition to  our verbal discussion during the visit.    Please note that this dictation was created using voice recognition software. I have attempted to correct all errors, but there may be sound-alike, spelling, grammar and possibly content errors that I did not discover before finalizing the note.    Dane Cyr PA-C

## 2023-04-08 DIAGNOSIS — G44.89 OTHER HEADACHE SYNDROME: ICD-10-CM

## 2023-04-08 DIAGNOSIS — R56.9 SEIZURE (HCC): ICD-10-CM

## 2023-04-10 RX ORDER — ZONISAMIDE 100 MG/1
CAPSULE ORAL
Qty: 120 CAPSULE | Refills: 5 | Status: SHIPPED | OUTPATIENT
Start: 2023-04-10 | End: 2023-12-04 | Stop reason: SDUPTHER

## 2023-04-10 NOTE — TELEPHONE ENCOUNTER
Received request via: Pharmacy    Was the patient seen in the last year in this department? No, Last OV 04/01/22    Does the patient have an active prescription (recently filled or refills available) for medication(s) requested? No    Does the patient have half-way Plus and need 100 day supply (blood pressure, diabetes and cholesterol meds only)? Medication is not for cholesterol, blood pressure or diabetes and Patient does not have SCP

## 2023-05-26 ENCOUNTER — APPOINTMENT (OUTPATIENT)
Dept: RADIOLOGY | Facility: MEDICAL CENTER | Age: 28
End: 2023-05-26
Attending: EMERGENCY MEDICINE
Payer: COMMERCIAL

## 2023-05-26 ENCOUNTER — HOSPITAL ENCOUNTER (EMERGENCY)
Facility: MEDICAL CENTER | Age: 28
End: 2023-05-26
Attending: EMERGENCY MEDICINE
Payer: COMMERCIAL

## 2023-05-26 ENCOUNTER — OFFICE VISIT (OUTPATIENT)
Dept: URGENT CARE | Facility: PHYSICIAN GROUP | Age: 28
End: 2023-05-26
Payer: COMMERCIAL

## 2023-05-26 VITALS
DIASTOLIC BLOOD PRESSURE: 64 MMHG | HEIGHT: 59 IN | TEMPERATURE: 100 F | SYSTOLIC BLOOD PRESSURE: 110 MMHG | OXYGEN SATURATION: 99 % | WEIGHT: 200 LBS | RESPIRATION RATE: 18 BRPM | BODY MASS INDEX: 40.32 KG/M2 | HEART RATE: 105 BPM

## 2023-05-26 VITALS
DIASTOLIC BLOOD PRESSURE: 85 MMHG | HEART RATE: 102 BPM | WEIGHT: 218.03 LBS | BODY MASS INDEX: 43.96 KG/M2 | SYSTOLIC BLOOD PRESSURE: 124 MMHG | OXYGEN SATURATION: 97 % | RESPIRATION RATE: 16 BRPM | TEMPERATURE: 98.5 F | HEIGHT: 59 IN

## 2023-05-26 DIAGNOSIS — R10.9 FLANK PAIN: ICD-10-CM

## 2023-05-26 DIAGNOSIS — R10.9 ACUTE ABDOMINAL PAIN IN LEFT FLANK: ICD-10-CM

## 2023-05-26 LAB
ALBUMIN SERPL BCP-MCNC: 4.3 G/DL (ref 3.2–4.9)
ALBUMIN/GLOB SERPL: 1.4 G/DL
ALP SERPL-CCNC: 58 U/L (ref 30–99)
ALT SERPL-CCNC: 25 U/L (ref 2–50)
AMORPH CRY #/AREA URNS HPF: PRESENT /HPF
ANION GAP SERPL CALC-SCNC: 16 MMOL/L (ref 7–16)
APPEARANCE UR: ABNORMAL
APPEARANCE UR: ABNORMAL
AST SERPL-CCNC: 17 U/L (ref 12–45)
BACTERIA #/AREA URNS HPF: ABNORMAL /HPF
BASOPHILS # BLD AUTO: 0.2 % (ref 0–1.8)
BASOPHILS # BLD: 0.02 K/UL (ref 0–0.12)
BILIRUB SERPL-MCNC: 0.3 MG/DL (ref 0.1–1.5)
BILIRUB UR QL STRIP.AUTO: ABNORMAL
BILIRUB UR STRIP-MCNC: ABNORMAL MG/DL
BUN SERPL-MCNC: 11 MG/DL (ref 8–22)
CALCIUM ALBUM COR SERPL-MCNC: 8.9 MG/DL (ref 8.5–10.5)
CALCIUM SERPL-MCNC: 9.1 MG/DL (ref 8.4–10.2)
CHLORIDE SERPL-SCNC: 105 MMOL/L (ref 96–112)
CO2 SERPL-SCNC: 17 MMOL/L (ref 20–33)
COLOR UR AUTO: ABNORMAL
COLOR UR: YELLOW
CREAT SERPL-MCNC: 0.83 MG/DL (ref 0.5–1.4)
EOSINOPHIL # BLD AUTO: 0.02 K/UL (ref 0–0.51)
EOSINOPHIL NFR BLD: 0.2 % (ref 0–6.9)
EPI CELLS #/AREA URNS HPF: ABNORMAL /HPF
ERYTHROCYTE [DISTWIDTH] IN BLOOD BY AUTOMATED COUNT: 43.2 FL (ref 35.9–50)
GFR SERPLBLD CREATININE-BSD FMLA CKD-EPI: 99 ML/MIN/1.73 M 2
GLOBULIN SER CALC-MCNC: 3.1 G/DL (ref 1.9–3.5)
GLUCOSE SERPL-MCNC: 78 MG/DL (ref 65–99)
GLUCOSE UR STRIP.AUTO-MCNC: ABNORMAL MG/DL
GLUCOSE UR STRIP.AUTO-MCNC: NEGATIVE MG/DL
HCG SERPL QL: NEGATIVE
HCT VFR BLD AUTO: 45.5 % (ref 37–47)
HGB BLD-MCNC: 15.3 G/DL (ref 12–16)
IMM GRANULOCYTES # BLD AUTO: 0.03 K/UL (ref 0–0.11)
IMM GRANULOCYTES NFR BLD AUTO: 0.3 % (ref 0–0.9)
KETONES UR STRIP.AUTO-MCNC: >=160 MG/DL
KETONES UR STRIP.AUTO-MCNC: >=80 MG/DL
LEUKOCYTE ESTERASE UR QL STRIP.AUTO: ABNORMAL
LEUKOCYTE ESTERASE UR QL STRIP.AUTO: NEGATIVE
LYMPHOCYTES # BLD AUTO: 1.55 K/UL (ref 1–4.8)
LYMPHOCYTES NFR BLD: 18 % (ref 22–41)
MCH RBC QN AUTO: 31.1 PG (ref 27–33)
MCHC RBC AUTO-ENTMCNC: 33.6 G/DL (ref 32.2–35.5)
MCV RBC AUTO: 92.5 FL (ref 81.4–97.8)
MICRO URNS: ABNORMAL
MONOCYTES # BLD AUTO: 0.71 K/UL (ref 0–0.85)
MONOCYTES NFR BLD AUTO: 8.3 % (ref 0–13.4)
NEUTROPHILS # BLD AUTO: 6.26 K/UL (ref 1.82–7.42)
NEUTROPHILS NFR BLD: 73 % (ref 44–72)
NITRITE UR QL STRIP.AUTO: ABNORMAL
NITRITE UR QL STRIP.AUTO: NEGATIVE
NRBC # BLD AUTO: 0 K/UL
NRBC BLD-RTO: 0 /100 WBC (ref 0–0.2)
PH UR STRIP.AUTO: 5.5 [PH] (ref 5–8)
PH UR STRIP.AUTO: 5.5 [PH] (ref 5–8)
PLATELET # BLD AUTO: 281 K/UL (ref 164–446)
PMV BLD AUTO: 9.5 FL (ref 9–12.9)
POCT INT CON NEG: NEGATIVE
POCT INT CON POS: POSITIVE
POCT URINE PREGNANCY TEST: NEGATIVE
POTASSIUM SERPL-SCNC: 3.6 MMOL/L (ref 3.6–5.5)
PROT SERPL-MCNC: 7.4 G/DL (ref 6–8.2)
PROT UR QL STRIP: ABNORMAL MG/DL
PROT UR QL STRIP: NEGATIVE MG/DL
RBC # BLD AUTO: 4.92 M/UL (ref 4.2–5.4)
RBC # URNS HPF: ABNORMAL /HPF
RBC UR QL AUTO: ABNORMAL
RBC UR QL AUTO: ABNORMAL
SODIUM SERPL-SCNC: 138 MMOL/L (ref 135–145)
SP GR UR STRIP.AUTO: 1.02
SP GR UR STRIP.AUTO: 1.02
UROBILINOGEN UR STRIP-MCNC: 0.2 MG/DL
WBC # BLD AUTO: 8.6 K/UL (ref 4.8–10.8)
WBC #/AREA URNS HPF: ABNORMAL /HPF

## 2023-05-26 PROCEDURE — 96375 TX/PRO/DX INJ NEW DRUG ADDON: CPT

## 2023-05-26 PROCEDURE — 99214 OFFICE O/P EST MOD 30 MIN: CPT | Performed by: NURSE PRACTITIONER

## 2023-05-26 PROCEDURE — 81025 URINE PREGNANCY TEST: CPT | Performed by: NURSE PRACTITIONER

## 2023-05-26 PROCEDURE — 84703 CHORIONIC GONADOTROPIN ASSAY: CPT

## 2023-05-26 PROCEDURE — 85025 COMPLETE CBC W/AUTO DIFF WBC: CPT

## 2023-05-26 PROCEDURE — 3078F DIAST BP <80 MM HG: CPT | Performed by: NURSE PRACTITIONER

## 2023-05-26 PROCEDURE — 96374 THER/PROPH/DIAG INJ IV PUSH: CPT

## 2023-05-26 PROCEDURE — 81002 URINALYSIS NONAUTO W/O SCOPE: CPT | Performed by: NURSE PRACTITIONER

## 2023-05-26 PROCEDURE — 80053 COMPREHEN METABOLIC PANEL: CPT

## 2023-05-26 PROCEDURE — 3074F SYST BP LT 130 MM HG: CPT | Performed by: NURSE PRACTITIONER

## 2023-05-26 PROCEDURE — 700111 HCHG RX REV CODE 636 W/ 250 OVERRIDE (IP): Performed by: EMERGENCY MEDICINE

## 2023-05-26 PROCEDURE — 99284 EMERGENCY DEPT VISIT MOD MDM: CPT

## 2023-05-26 PROCEDURE — 81001 URINALYSIS AUTO W/SCOPE: CPT

## 2023-05-26 PROCEDURE — 36415 COLL VENOUS BLD VENIPUNCTURE: CPT

## 2023-05-26 PROCEDURE — 74176 CT ABD & PELVIS W/O CONTRAST: CPT

## 2023-05-26 RX ORDER — ONDANSETRON 2 MG/ML
4 INJECTION INTRAMUSCULAR; INTRAVENOUS ONCE
Status: COMPLETED | OUTPATIENT
Start: 2023-05-26 | End: 2023-05-26

## 2023-05-26 RX ORDER — IBUPROFEN 800 MG/1
800 TABLET ORAL EVERY 8 HOURS PRN
Qty: 30 TABLET | Refills: 0 | Status: SHIPPED | OUTPATIENT
Start: 2023-05-26 | End: 2023-09-12

## 2023-05-26 RX ORDER — MORPHINE SULFATE 4 MG/ML
4 INJECTION INTRAVENOUS ONCE
Status: COMPLETED | OUTPATIENT
Start: 2023-05-26 | End: 2023-05-26

## 2023-05-26 RX ORDER — CYCLOBENZAPRINE HCL 10 MG
10 TABLET ORAL 3 TIMES DAILY PRN
Qty: 30 TABLET | Refills: 0 | Status: SHIPPED | OUTPATIENT
Start: 2023-05-26 | End: 2023-09-12

## 2023-05-26 RX ADMIN — MORPHINE SULFATE 4 MG: 4 INJECTION INTRAVENOUS at 19:15

## 2023-05-26 RX ADMIN — ONDANSETRON 4 MG: 2 INJECTION INTRAMUSCULAR; INTRAVENOUS at 19:15

## 2023-05-26 ASSESSMENT — LIFESTYLE VARIABLES
HAVE YOU EVER FELT YOU SHOULD CUT DOWN ON YOUR DRINKING: NO
TOTAL SCORE: 0
ON A TYPICAL DAY WHEN YOU DRINK ALCOHOL HOW MANY DRINKS DO YOU HAVE: 0
AVERAGE NUMBER OF DAYS PER WEEK YOU HAVE A DRINK CONTAINING ALCOHOL: 0
CONSUMPTION TOTAL: NEGATIVE
TOTAL SCORE: 0
HAVE PEOPLE ANNOYED YOU BY CRITICIZING YOUR DRINKING: NO
DO YOU DRINK ALCOHOL: NO
EVER FELT BAD OR GUILTY ABOUT YOUR DRINKING: NO
EVER HAD A DRINK FIRST THING IN THE MORNING TO STEADY YOUR NERVES TO GET RID OF A HANGOVER: NO
HOW MANY TIMES IN THE PAST YEAR HAVE YOU HAD 5 OR MORE DRINKS IN A DAY: 0
TOTAL SCORE: 0

## 2023-05-26 ASSESSMENT — FIBROSIS 4 INDEX
FIB4 SCORE: 0.29
FIB4 SCORE: 0.29

## 2023-05-26 NOTE — Clinical Note
Orin Rodas was seen and treated in our emergency department on 5/26/2023.  She may return to work on 05/29/2023.       If you have any questions or concerns, please don't hesitate to call.      Marvin Osborne D.O.

## 2023-05-27 NOTE — ED NOTES
Patient is stable for discharge at this time, anticipatory guidance provided, close follow-up is encouraged, and ED return instructions have been detailed. Patient is both agreeable to the disposition and plan and discharged home in ambulatory state and in good condition.      Rx education provided, Pt verbalized understanding;

## 2023-05-27 NOTE — ED PROVIDER NOTES
"ED Provider Note    CHIEF COMPLAINT  Chief Complaint   Patient presents with    Abdominal Pain     Bilat lower abd with vomiting per pt.     Flank Pain     Bilat flank pain with fevers.        EXTERNAL RECORDS REVIEWED  None none      HPI/ROS  LIMITATION TO HISTORY   Select: : None  OUTSIDE HISTORIAN(S):  none    Orin Rodas is a 27 y.o. female who presents here for evaluation of bilateral flank pain.  Patient states that she has bilateral flank pain over the last couple of days, with some lower abdominal pain.  Patient has had intermittent fevers on Monday, of around 101.  She has not been taking anything prior to arrival for the same.  Patient states that she has no chest pain or shortness of breath, she has not had any vomiting other than some on Tuesday, which was 3 days ago    PAST MEDICAL HISTORY   has a past medical history of Depression, Epilepsy (HCC), and Seizure (HCC).    SURGICAL HISTORY  patient denies any surgical history    FAMILY HISTORY  Family History   Problem Relation Age of Onset    Diabetes Maternal Grandmother        SOCIAL HISTORY  Social History     Tobacco Use    Smoking status: Some Days     Types: Cigarettes     Last attempt to quit: 2021     Years since quittin.4    Smokeless tobacco: Never    Tobacco comments:     Hookah    Vaping Use    Vaping Use: Former    Substances: Flavoring   Substance and Sexual Activity    Alcohol use: No     Alcohol/week: 0.0 oz    Drug use: No    Sexual activity: Yes       CURRENT MEDICATIONS  Home Medications       Reviewed by Olivia Modi R.N. (Registered Nurse) on 23 at 1815  Med List Status: Not Addressed     Medication Last Dose Status   folic acid (FOLVITE) 1 MG Tab  Active   mirtazapine (REMERON) 15 MG Tab  Active   zonisamide (ZONEGRAN) 100 MG Cap  Active                    ALLERGIES  No Known Allergies    PHYSICAL EXAM  VITAL SIGNS: /69   Pulse 94   Temp 37.2 °C (99 °F) (Temporal)   Resp 20   Ht 1.499 m (4' 11\")   " Wt 98.9 kg (218 lb 0.6 oz)   LMP 05/17/2023 (Exact Date)   SpO2 100%   BMI 44.04 kg/m²    Constitutional: Well developed, well nourished. No acute distress.  HEENT: Normocephalic, atraumatic. Posterior pharynx clear and moist.  Eyes:  EOMI. Normal sclera.  Neck: Supple, Full range of motion, nontender.  Chest/Pulmonary: clear to ausculation. Symmetrical expansion.   Cardio: Regular rate and rhythm with no murmur.   Abdomen: Soft, suprapubic, no peritoneal signs. No guarding. No palpable masses.  Back: Mild bilateral CVA tenderness, nontender midline, no step offs.  Musculoskeletal: No deformity, no edema, neurovascular intact.   Neuro: Clear speech, appropriate, cooperative, cranial nerves II-XII grossly intact.  Psych: Normal mood and affect      DIAGNOSTIC STUDIES / PROCEDURES  Results for orders placed or performed during the hospital encounter of 05/26/23   CBC WITH DIFFERENTIAL   Result Value Ref Range    WBC 8.6 4.8 - 10.8 K/uL    RBC 4.92 4.20 - 5.40 M/uL    Hemoglobin 15.3 12.0 - 16.0 g/dL    Hematocrit 45.5 37.0 - 47.0 %    MCV 92.5 81.4 - 97.8 fL    MCH 31.1 27.0 - 33.0 pg    MCHC 33.6 32.2 - 35.5 g/dL    RDW 43.2 35.9 - 50.0 fL    Platelet Count 281 164 - 446 K/uL    MPV 9.5 9.0 - 12.9 fL    Neutrophils-Polys 73.00 (H) 44.00 - 72.00 %    Lymphocytes 18.00 (L) 22.00 - 41.00 %    Monocytes 8.30 0.00 - 13.40 %    Eosinophils 0.20 0.00 - 6.90 %    Basophils 0.20 0.00 - 1.80 %    Immature Granulocytes 0.30 0.00 - 0.90 %    Nucleated RBC 0.00 0.00 - 0.20 /100 WBC    Neutrophils (Absolute) 6.26 1.82 - 7.42 K/uL    Lymphs (Absolute) 1.55 1.00 - 4.80 K/uL    Monos (Absolute) 0.71 0.00 - 0.85 K/uL    Eos (Absolute) 0.02 0.00 - 0.51 K/uL    Baso (Absolute) 0.02 0.00 - 0.12 K/uL    Immature Granulocytes (abs) 0.03 0.00 - 0.11 K/uL    NRBC (Absolute) 0.00 K/uL   COMP METABOLIC PANEL   Result Value Ref Range    Sodium 138 135 - 145 mmol/L    Potassium 3.6 3.6 - 5.5 mmol/L    Chloride 105 96 - 112 mmol/L    Co2 17  (L) 20 - 33 mmol/L    Anion Gap 16.0 7.0 - 16.0    Glucose 78 65 - 99 mg/dL    Bun 11 8 - 22 mg/dL    Creatinine 0.83 0.50 - 1.40 mg/dL    Calcium 9.1 8.4 - 10.2 mg/dL    AST(SGOT) 17 12 - 45 U/L    ALT(SGPT) 25 2 - 50 U/L    Alkaline Phosphatase 58 30 - 99 U/L    Total Bilirubin 0.3 0.1 - 1.5 mg/dL    Albumin 4.3 3.2 - 4.9 g/dL    Total Protein 7.4 6.0 - 8.2 g/dL    Globulin 3.1 1.9 - 3.5 g/dL    A-G Ratio 1.4 g/dL   URINALYSIS CULTURE, IF INDICATED    Specimen: Urine, Clean Catch   Result Value Ref Range    Color Yellow     Character Hazy (A)     Specific Gravity 1.025 <1.035    Ph 5.5 5.0 - 8.0    Glucose Negative Negative mg/dL    Ketones >=80 (A) Negative mg/dL    Protein Negative Negative mg/dL    Bilirubin Small (A) Negative    Nitrite Negative Negative    Leukocyte Esterase Negative Negative    Occult Blood Trace (A) Negative    Micro Urine Req Microscopic    HCG QUAL SERUM   Result Value Ref Range    Beta-Hcg Qualitative Serum Negative Negative   URINE MICROSCOPIC (W/UA)   Result Value Ref Range    WBC 2-5 /hpf    RBC 0-2 /hpf    Bacteria Few (A) None /hpf    Epithelial Cells Few Few /hpf    Amorphous Crystal Present /hpf   CORRECTED CALCIUM   Result Value Ref Range    Correct Calcium 8.9 8.5 - 10.5 mg/dL   ESTIMATED GFR   Result Value Ref Range    GFR (CKD-EPI) 99 >60 mL/min/1.73 m 2        RADIOLOGY  I have independently interpreted the diagnostic imaging associated with this visit and am waiting the final reading from the radiologist.   My preliminary interpretation is as follows: See below  Radiologist interpretation:   CT-RENAL COLIC EVALUATION(A/P W/O)   Final Result      1.  Nonobstructing bilateral nephrolithiasis. No hydronephrosis.   2.  Hepatic steatosis.          COURSE & MEDICAL DECISION MAKING  See below discharge home    INITIAL ASSESSMENT, COURSE AND PLAN  Care Narrative: This is a 27-year-old female here for evaluation of lower back pain.  The patient at this time, 8:41 PM, states that she  feels fine.  She has no vomiting, no chest pain, no shortness of breath.  Patient will follow-up as outpatient, or return here for any further issues or concerns.    DISPOSITION AND DISCUSSIONS  I have discussed management of the patient with the following physicians and ZAHRA's: None    Discussion of management with other QHP or appropriate source(s): None    Escalation of care considered, and ultimately not performed: None    Barriers to care at this time, including but not limited to: Patient does not have established PCP.     Decision tools and prescription drugs considered including, but not limited to:  None .    FINAL DIAGNOSIS  1. Flank pain           Electronically signed by: Marvin Osborne D.O., 5/26/2023 6:59 PM

## 2023-05-27 NOTE — PROGRESS NOTES
Chief Complaint   Patient presents with    Abdominal Pain      X 3 days, Lower left abd px, Lower back px, px in pelvis area, fever of 101        HISTORY OF PRESENT ILLNESS: Patient is a pleasant 27 y.o. female who presents to urgent care today with complaints of abdominal pain.  Patient notes that for the past 3 days she has had pain to her lower abdomen, specifically left side and flank region.  Describes her pain as sharp.  She feels her symptoms are worsening.  Now endorses a fever as well as dysuria.  She has not tried any medication for symptom relief.  Denies previous history of the same.    Patient Active Problem List    Diagnosis Date Noted    Obstructive sleep apnea syndrome 2021    History of UTI 2021    Depression 2021    Hair loss 2021    Headache 2021    COVID-19 virus infection 2020    Hyperlipidemia 2020    Seizure (HCC) 2020    Hyperglycemia 2020    Overweight 2020    Cervicalgia 2020    Bilateral low back pain without sciatica 2015       Allergies:Patient has no known allergies.    Current Outpatient Medications Ordered in Epic   Medication Sig Dispense Refill    zonisamide (ZONEGRAN) 100 MG Cap TAKE 1 CAPSULE BY MOUTH IN THE MORNING AND 3 CAPSULES AT BEDTIME 120 Capsule 5    mirtazapine (REMERON) 15 MG Tab TAKE 1 TABLET BY MOUTH EVERY EVENING 30 Tablet 5    folic acid (FOLVITE) 1 MG Tab Take 1 Tablet by mouth every day. 90 Tablet 3     No current Epic-ordered facility-administered medications on file.       Past Medical History:   Diagnosis Date    Depression     Epilepsy (HCC)     Seizure (HCC)     since October       Social History     Tobacco Use    Smoking status: Some Days     Types: Cigarettes     Last attempt to quit: 2021     Years since quittin.4    Smokeless tobacco: Never    Tobacco comments:     Hookah    Vaping Use    Vaping Use: Former    Substances: Flavoring   Substance Use Topics    Alcohol use: No      "Alcohol/week: 0.0 oz    Drug use: No       Family Status   Relation Name Status    Mo  Alive    Fa  Alive    Sis  Alive    MGMo  Alive    MGFa       Family History   Problem Relation Age of Onset    Diabetes Maternal Grandmother        ROS:  Review of Systems   Constitutional: Positive for fever.  Negative for chills, weight loss, malaise, and fatigue.   HENT: Negative for ear pain, nosebleeds, congestion, sore throat and neck pain.    Eyes: Negative for vision changes.   Neuro: Negative for headache, sensory changes, weakness, seizure, LOC.   Cardiovascular: Negative for chest pain, palpitations, orthopnea and leg swelling.   Respiratory: Negative for cough, sputum production, shortness of breath and wheezing.   Gastrointestinal: Positive for abdominal pain.   Negative for nausea, vomiting or diarrhea.   Genitourinary: Positive for dysuria.  Negative for urgency and frequency.  Musculoskeletal: Negative for falls, neck pain, back pain, joint pain, myalgias.   Skin: Negative for rash, diaphoresis.     Exam:  /64   Pulse (!) 105   Temp 37.8 °C (100 °F) (Temporal)   Resp 18   Ht 1.499 m (4' 11\")   Wt 90.7 kg (200 lb)   SpO2 99%   General: well-nourished, well-developed female in NAD  Head: normocephalic, atraumatic  Eyes: PERRLA, no conjunctival injection, acuity grossly intact, lids normal.  Ears: normal shape and symmetry, no tenderness, no discharge. External canals are without any significant edema or erythema. Tympanic membranes are without any inflammation, no effusion. Gross auditory acuity is intact.  Nose: symmetrical without tenderness, no discharge.  Mouth/Throat: reasonable hygiene, no erythema, exudates or tonsillar enlargement.  Neck: no masses, range of motion within normal limits, no tracheal deviation. No obvious thyroid enlargement.   Lymph: no cervical adenopathy. No supraclavicular adenopathy.   Neuro: alert and oriented. Cranial nerves 1-12 grossly intact. No sensory deficit. "   Cardiovascular: Tachycardic rate and regular rhythm. No edema.  Pulmonary: no distress. Chest is symmetrical with respiration, no wheezes, crackles, or rhonchi.   Abdomen: soft, left lower quadrant and left lateral abdominal tenderness, no guarding, no hepatosplenomegaly.  Left-sided CVA tenderness.  Musculoskeletal: no clubbing, appropriate muscle tone, gait is stable.  Skin: warm, dry, intact, no clubbing, no cyanosis, no rashes.   Psych: appropriate mood, affect, judgement.       POC urine positive for ketones, blood, leukocytes      Assessment/Plan:  1. Acute abdominal pain in left flank  POCT Urinalysis    POCT Pregnancy            Patient is a pleasant 27-year-old female who presents with 3 days of abdominal pain, flank pain, fever, dysuria. Differential diagnoses include but are not limited to: Pyelonephritis, nephrolithiasis with infection, hydronephrosis. At this time, I feel the patient requires a higher level of care in the ED for closer monitoring, stat lab work and/or imaging for further evaluation. This has been discussed with the patient and she states agreement and understanding. The patient is stable to leave POV at this time and will go directly to ED without delay, driven by her significant other, instructed remain n.p.o.           Please note that this dictation was created using voice recognition software. I have made every reasonable attempt to correct obvious errors, but I expect that there are errors of grammar and possibly content that I did not discover before finalizing the note.      NATHALIE Hope.

## 2023-09-12 ENCOUNTER — OFFICE VISIT (OUTPATIENT)
Dept: MEDICAL GROUP | Facility: PHYSICIAN GROUP | Age: 28
End: 2023-09-12
Payer: COMMERCIAL

## 2023-09-12 VITALS
OXYGEN SATURATION: 100 % | HEART RATE: 84 BPM | SYSTOLIC BLOOD PRESSURE: 112 MMHG | WEIGHT: 178 LBS | RESPIRATION RATE: 20 BRPM | HEIGHT: 59 IN | BODY MASS INDEX: 35.88 KG/M2 | TEMPERATURE: 97.8 F | DIASTOLIC BLOOD PRESSURE: 70 MMHG

## 2023-09-12 DIAGNOSIS — R73.9 HYPERGLYCEMIA: Chronic | ICD-10-CM

## 2023-09-12 DIAGNOSIS — G47.33 OBSTRUCTIVE SLEEP APNEA SYNDROME: Chronic | ICD-10-CM

## 2023-09-12 DIAGNOSIS — R56.9 SEIZURE (HCC): Chronic | ICD-10-CM

## 2023-09-12 DIAGNOSIS — G47.00 INSOMNIA, UNSPECIFIED TYPE: ICD-10-CM

## 2023-09-12 DIAGNOSIS — Z23 NEED FOR VACCINATION: ICD-10-CM

## 2023-09-12 DIAGNOSIS — E66.3 OVERWEIGHT: Chronic | ICD-10-CM

## 2023-09-12 DIAGNOSIS — F32.A DEPRESSION, UNSPECIFIED DEPRESSION TYPE: ICD-10-CM

## 2023-09-12 DIAGNOSIS — E78.5 DYSLIPIDEMIA: Chronic | ICD-10-CM

## 2023-09-12 DIAGNOSIS — Z01.419 WELL WOMAN EXAM: ICD-10-CM

## 2023-09-12 DIAGNOSIS — Z11.59 NEED FOR HEPATITIS C SCREENING TEST: ICD-10-CM

## 2023-09-12 PROBLEM — F51.01 PRIMARY INSOMNIA: Chronic | Status: ACTIVE | Noted: 2023-09-12

## 2023-09-12 PROBLEM — F51.01 PRIMARY INSOMNIA: Status: ACTIVE | Noted: 2023-09-12

## 2023-09-12 PROCEDURE — 90471 IMMUNIZATION ADMIN: CPT | Performed by: INTERNAL MEDICINE

## 2023-09-12 PROCEDURE — 90686 IIV4 VACC NO PRSV 0.5 ML IM: CPT | Performed by: INTERNAL MEDICINE

## 2023-09-12 PROCEDURE — 3078F DIAST BP <80 MM HG: CPT | Performed by: INTERNAL MEDICINE

## 2023-09-12 PROCEDURE — 90677 PCV20 VACCINE IM: CPT | Performed by: INTERNAL MEDICINE

## 2023-09-12 PROCEDURE — 90472 IMMUNIZATION ADMIN EACH ADD: CPT | Performed by: INTERNAL MEDICINE

## 2023-09-12 PROCEDURE — 3074F SYST BP LT 130 MM HG: CPT | Performed by: INTERNAL MEDICINE

## 2023-09-12 PROCEDURE — 99214 OFFICE O/P EST MOD 30 MIN: CPT | Mod: 25 | Performed by: INTERNAL MEDICINE

## 2023-09-12 RX ORDER — FOLIC ACID 1 MG/1
1 TABLET ORAL DAILY
Qty: 90 TABLET | Refills: 3 | Status: SHIPPED | OUTPATIENT
Start: 2023-09-12

## 2023-09-12 RX ORDER — MIRTAZAPINE 15 MG/1
15 TABLET, FILM COATED ORAL EVERY EVENING
Qty: 30 TABLET | Refills: 5 | Status: SHIPPED | OUTPATIENT
Start: 2023-09-12 | End: 2023-10-12

## 2023-09-12 ASSESSMENT — PATIENT HEALTH QUESTIONNAIRE - PHQ9: CLINICAL INTERPRETATION OF PHQ2 SCORE: 0

## 2023-09-12 ASSESSMENT — FIBROSIS 4 INDEX: FIB4 SCORE: 0.33

## 2023-09-12 NOTE — ASSESSMENT & PLAN NOTE
Chronic condition.  Noted with previous lab test in 2020.  Recommend lab test be done for follow-up

## 2023-09-12 NOTE — ASSESSMENT & PLAN NOTE
Chronic condition.  The patient followed by neurology service  Patient has been taking Zonegran  Last seizure activity was approximately 3 years ago  Patient tolerating medication well  No significant side effects reported.

## 2023-09-12 NOTE — ASSESSMENT & PLAN NOTE
Chronic stable condition.  The patient has been taking mirtazapine 15 Mg daily.  The medication has been working well.  No significant side effects reported.  The patient request Rx refill.  Patient denies SI.

## 2023-09-12 NOTE — ASSESSMENT & PLAN NOTE
This is a chronic and stable condition.  The patient has been taking mirtazapine 15 Mg at bedtime.  The medication has been working well for the patient.  No significant side effects reported.  Patient is requesting Rx refill.

## 2023-09-12 NOTE — PROGRESS NOTES
PRIMARY CARE CLINIC VISIT    Chief complaint:    Follow-up seizure  Hyperglycemia   overweight  Insomnia  Hyperlipidemia  Sleep apnea   vaccination status  Referral to GYN    History of Present Illness     Seizure (HCC)  Chronic condition.  The patient followed by neurology service  Patient has been taking Zonegran  Last seizure activity was approximately 3 years ago  Patient tolerating medication well  No significant side effects reported.    Hyperglycemia  Chronic condition.  Noted with previous lab test in 2020.  Recommend lab test be done for follow-up    Overweight  Chronic condition.    Counseling on health consequences related to obesity.  Discussed with the patient regarding diet, exercise, and lifestyle modification to help achieve and maintain healthy weight          Dyslipidemia  Chronic condition for the patient presently not on therapy.  Lab test ordered for follow-up.    Depression  Chronic stable condition.  The patient has been taking mirtazapine 15 Mg daily.  The medication has been working well.  No significant side effects reported.  The patient request Rx refill.  Patient denies SI.    Obstructive sleep apnea syndrome  Chronic condition.  The patient has CPAP machine.  Advised the patient to use this daily nightly.    Need for vaccination  Patient is due for influenza vaccine and Pneumovax.    Primary insomnia  This is a chronic and stable condition.  The patient has been taking mirtazapine 15 Mg at bedtime.  The medication has been working well for the patient.  No significant side effects reported.  Patient is requesting Rx refill.    Current Outpatient Medications on File Prior to Visit   Medication Sig Dispense Refill    zonisamide (ZONEGRAN) 100 MG Cap TAKE 1 CAPSULE BY MOUTH IN THE MORNING AND 3 CAPSULES AT BEDTIME 120 Capsule 5     No current facility-administered medications on file prior to visit.        Allergies: Patient has no known allergies.    Current Outpatient Medications Ordered  "in Epic   Medication Sig Dispense Refill    mirtazapine (REMERON) 15 MG Tab Take 1 Tablet by mouth every evening for 30 days. 30 Tablet 5    folic acid (FOLVITE) 1 MG Tab Take 1 Tablet by mouth every day. 90 Tablet 3    zonisamide (ZONEGRAN) 100 MG Cap TAKE 1 CAPSULE BY MOUTH IN THE MORNING AND 3 CAPSULES AT BEDTIME 120 Capsule 5     No current [facility]-ordered facility-administered medications on file.       Past Medical History:   Diagnosis Date    Depression     Epilepsy (HCC)     Seizure (HCC)     since October       History reviewed. No pertinent surgical history.    Family History   Problem Relation Age of Onset    Diabetes Maternal Grandmother        Social History     Tobacco Use   Smoking Status Some Days    Current packs/day: 0.00    Types: Cigarettes    Last attempt to quit: 2021    Years since quittin.7   Smokeless Tobacco Never   Tobacco Comments    Hookah        Social History     Substance and Sexual Activity   Alcohol Use No    Alcohol/week: 0.0 oz       Review of systems.  As per HPI above. All other systems reviewed and negative.      Past Medical, Social, and Family history reviewed and updated in EPIC     Objective     /70   Pulse 84   Temp 36.6 °C (97.8 °F) (Temporal)   Resp 20   Ht 1.499 m (4' 11\")   Wt 80.7 kg (178 lb)   SpO2 100%    Body mass index is 35.95 kg/m².    General: alert in no apparent distress.  Cardiovascular: regular rate and rhythm  Pulmonary: lungs : no wheezing   Gastrointestinal: BS present. No obvious mass noted        Lab Results   Component Value Date/Time    HBA1C 5.6 2021 12:27 PM    HBA1C 5.3 2020 09:33 AM    HBA1C 5.7 (H) 2017 02:46 AM       Lab Results   Component Value Date/Time    WBC 8.6 2023 06:50 PM    HEMOGLOBIN 15.3 2023 06:50 PM    HEMATOCRIT 45.5 2023 06:50 PM    MCV 92.5 2023 06:50 PM    PLATELETCT 281 2023 06:50 PM         Lab Results   Component Value Date/Time    SODIUM 138 2023 06:50 " PM    POTASSIUM 3.6 05/26/2023 06:50 PM    GLUCOSE 78 05/26/2023 06:50 PM    BUN 11 05/26/2023 06:50 PM    CREATININE 0.83 05/26/2023 06:50 PM       Lab Results   Component Value Date/Time    CHOLSTRLTOT 175 03/25/2021 12:27 PM    TRIGLYCERIDE 200 (H) 03/25/2021 12:27 PM    HDL 35 (A) 03/25/2021 12:27 PM     (H) 03/25/2021 12:27 PM       Lab Results   Component Value Date/Time    ALTSGPT 25 05/26/2023 06:50 PM             Assessment and Plan     1. Seizure (HCC)  Chronic stable condition.  Continue with Zonegran 400 mg total daily.  Continue follow-up with neurology service.    2. Depression, unspecified depression type  - mirtazapine (REMERON) 15 MG Tab; Take 1 Tablet by mouth every evening for 30 days.  Dispense: 30 Tablet; Refill: 5  Chronic stable condition.  Counseling given today.  Continue with mirtazapine 15 Mg daily.  Continue to monitor    3. Insomnia, unspecified type  - mirtazapine (REMERON) 15 MG Tab; Take 1 Tablet by mouth every evening for 30 days.  Dispense: 30 Tablet; Refill: 5  Chronic stable condition.  Continue mirtazapine 15 Mg at bedtime.    4. Hyperglycemia  - HEMOGLOBIN A1C; Future  - Basic Metabolic Panel; Future  Chronic condition.  Current status unclear.  Lab test requested for follow-up.    5. Overweight  Chronic condition.  Recommend diet and lifestyle modification.  Encouraged patient to lose weight.    6. Dyslipidemia  - Lipid Profile; Future  Chronic condition.  Current status unclear.  Lab test ordered for follow-up.    7. Obstructive sleep apnea syndrome  Chronic condition.  Recommend patient to continue using CPAP machine nightly.    8. Need for vaccination  - Pneumococcal Conjugate Vaccine 20-Valent (19 yrs+)  - Influenza Vaccine Quad Injection (PF)                      Please note that this dictation was created using voice recognition software. I have made every reasonable attempt to correct obvious errors, but I expect that there are errors of grammar and possibly  content that I did not discover before finalizing the note.    Ed Patel MD  Internal Medicine  [facility]

## 2023-12-04 ENCOUNTER — OFFICE VISIT (OUTPATIENT)
Dept: NEUROLOGY | Facility: MEDICAL CENTER | Age: 28
End: 2023-12-04
Attending: STUDENT IN AN ORGANIZED HEALTH CARE EDUCATION/TRAINING PROGRAM
Payer: COMMERCIAL

## 2023-12-04 VITALS
BODY MASS INDEX: 33.87 KG/M2 | WEIGHT: 167.99 LBS | HEART RATE: 80 BPM | SYSTOLIC BLOOD PRESSURE: 122 MMHG | TEMPERATURE: 97.3 F | HEIGHT: 59 IN | OXYGEN SATURATION: 98 % | DIASTOLIC BLOOD PRESSURE: 68 MMHG

## 2023-12-04 DIAGNOSIS — G47.00 INSOMNIA, UNSPECIFIED TYPE: ICD-10-CM

## 2023-12-04 DIAGNOSIS — R56.9 SEIZURE (HCC): ICD-10-CM

## 2023-12-04 DIAGNOSIS — Z87.440 HISTORY OF UTI: ICD-10-CM

## 2023-12-04 DIAGNOSIS — F32.89 OTHER DEPRESSION: ICD-10-CM

## 2023-12-04 DIAGNOSIS — R56.9 CONVULSIONS, UNSPECIFIED CONVULSION TYPE (HCC): ICD-10-CM

## 2023-12-04 DIAGNOSIS — Z65.8 PSYCHOSOCIAL STRESSORS: ICD-10-CM

## 2023-12-04 DIAGNOSIS — F32.A DEPRESSION, UNSPECIFIED DEPRESSION TYPE: Chronic | ICD-10-CM

## 2023-12-04 DIAGNOSIS — G47.33 OBSTRUCTIVE SLEEP APNEA SYNDROME: Chronic | ICD-10-CM

## 2023-12-04 DIAGNOSIS — F51.01 PRIMARY INSOMNIA: Chronic | ICD-10-CM

## 2023-12-04 DIAGNOSIS — R79.89 LOW VITAMIN D LEVEL: ICD-10-CM

## 2023-12-04 DIAGNOSIS — G44.89 OTHER HEADACHE SYNDROME: ICD-10-CM

## 2023-12-04 PROCEDURE — 3074F SYST BP LT 130 MM HG: CPT | Performed by: STUDENT IN AN ORGANIZED HEALTH CARE EDUCATION/TRAINING PROGRAM

## 2023-12-04 PROCEDURE — 99215 OFFICE O/P EST HI 40 MIN: CPT | Performed by: STUDENT IN AN ORGANIZED HEALTH CARE EDUCATION/TRAINING PROGRAM

## 2023-12-04 PROCEDURE — 3078F DIAST BP <80 MM HG: CPT | Performed by: STUDENT IN AN ORGANIZED HEALTH CARE EDUCATION/TRAINING PROGRAM

## 2023-12-04 PROCEDURE — 99211 OFF/OP EST MAY X REQ PHY/QHP: CPT | Performed by: STUDENT IN AN ORGANIZED HEALTH CARE EDUCATION/TRAINING PROGRAM

## 2023-12-04 RX ORDER — MIRTAZAPINE 30 MG/1
30 TABLET, FILM COATED ORAL EVERY EVENING
Qty: 30 TABLET | Refills: 5 | Status: SHIPPED | OUTPATIENT
Start: 2023-12-04 | End: 2024-06-01

## 2023-12-04 RX ORDER — MIRTAZAPINE 15 MG/1
15 TABLET, FILM COATED ORAL EVERY EVENING
COMMUNITY
Start: 2023-11-18 | End: 2023-12-04 | Stop reason: DRUGHIGH

## 2023-12-04 RX ORDER — ZONISAMIDE 100 MG/1
100 CAPSULE ORAL DAILY
Qty: 90 CAPSULE | Refills: 3 | Status: SHIPPED | OUTPATIENT
Start: 2023-12-04 | End: 2024-11-28

## 2023-12-04 ASSESSMENT — FIBROSIS 4 INDEX: FIB4 SCORE: 0.33

## 2023-12-04 NOTE — PROGRESS NOTES
"NEUROLOGY FOLLOW-UP - 12/04/2023     REASON FOR VISIT: Orin Rodas 28 y.o. female presents today for follow-up     -    INTERVAL HISTORY:  Patient returns for f/u. She is accompanied by her s/o.  She had gone almost 1 year without a seizure. Then in September 2023 she had 2 convulsions 5 hours apart, witnessed by s/o. Convulsions occurred in the setting of stress (her sister recently passed away) and sleep deprivation. No seizures since then. S/o who is present showed me a video of one of her seizures which showed her body twitching/shaking off and on, arrhythmically, grunting, eyes closed. It appeared nonepileptic.    Patient reports she had lost weight, stopped drinking caffeine and alcohol. These lifestyle changes have improved her health. She does feel better but does not went to slip backwards.     She had self-lowered zonisamide to 100 mg QHS over a year ago.       Under a lot of stress lately. Insomnia is plagueing her. She has trouble falling Mirtazepine    Patient reports she stopped using CPAP for EDGARD    CURRENT MEDICATIONS AT THE TIME OF THIS ENCOUNTER:  Current Outpatient Medications on File Prior to Visit   Medication Sig Dispense Refill    folic acid (FOLVITE) 1 MG Tab Take 1 Tablet by mouth every day. 90 Tablet 3     No current facility-administered medications on file prior to visit.          EXAM:   /68 (BP Location: Right arm, Patient Position: Sitting, BP Cuff Size: Adult)   Pulse 80   Temp 36.3 °C (97.3 °F) (Temporal)   Ht 1.499 m (4' 11\")   Wt 76.2 kg (167 lb 15.9 oz)   SpO2 98%    Wt Readings from Last 5 Encounters:   12/04/23 76.2 kg (167 lb 15.9 oz)   09/12/23 80.7 kg (178 lb)   05/26/23 98.9 kg (218 lb 0.6 oz)   05/26/23 90.7 kg (200 lb)   03/15/23 96 kg (211 lb 10.3 oz)      Physical Exam:  Physical Exam  Constitutional:       General: She is not in acute distress.  HENT:      Head: Normocephalic.   Eyes:      Extraocular Movements: EOM normal. No nystagmus. "   Neurological:      Mental Status: She is alert.      Motor: Motor strength is normal.  Psychiatric:         Speech: Speech normal.        Neurological Exam   Neurological Exam  Mental Status  Alert. Speech is normal. Language is fluent with no aphasia. Attention and concentration are normal.    Cranial Nerves  CN III, IV, VI: Extraocular movements intact bilaterally. No nystagmus. Normal saccades. Normal smooth pursuit.  CN V: Facial sensation is normal.  CN VII: Full and symmetric facial movement.  CN IX, X: Palate elevates symmetrically  CN XI: Shoulder shrug strength is normal.  CN XII: Tongue midline without atrophy or fasciculations.    Motor  Normal muscle bulk throughout. No fasciculations present. Normal muscle tone. No abnormal involuntary movements. Strength is 5/5 throughout all four extremities.    Sensory  Light touch is normal in upper and lower extremities.     Coordination  Right: Finger-to-nose normal.Left: Finger-to-nose normal.    Gait  Casual gait is normal including stance, stride, and arm swing.         ASSESSMENT, EDUCATION, AND COUNSELING:  This is a 28 y.o. female patient who presents to the neurology clinic. We had an extensive discussion about the patient's symptoms, signs, and work-up to date, if any. We discussed potential and/or definitive diagnoses, work-up, and potential treatments.     PLAN:  If applicable, the work-up such as labs, imaging, procedures, and/or other testing, referrals, and/or recommended treatment strategies are listed below.    Medications were administered today in clinic (if any):     Visit Diagnoses     ICD-10-CM   1. Depression, unspecified depression type  F32.A   2. Primary insomnia  F51.01   3. Obstructive sleep apnea syndrome  G47.33   4. Insomnia, unspecified type  G47.00   5. Other headache syndrome  G44.89   6. Convulsions, unspecified convulsion type (HCC)  R56.9   7. Psychosocial stressors  Z65.8   8. Seizure (HCC)  R56.9   9. Low vitamin D level   R79.89   10. History of UTI  Z87.440   11. Other depression  F32.89      Orders Placed This Encounter    CBC WITH DIFFERENTIAL    Comp Metabolic Panel    ZONISAMIDE (ZONEGRAN)    VITAMIN D,25 HYDROXY (DEFICIENCY)    DISCONTD: mirtazapine (REMERON) 15 MG Tab    mirtazapine (REMERON) 30 MG Tab tablet    zonisamide (ZONEGRAN) 100 MG Cap        Patient with a history of seizures. Epileptic vs nonepileptic in nature based on my viewing of a recoding seizure on s/o phone. Continue zonisamide 100 mg QD. Refills sent. Increasing mirtazepine from 15 to 30 QHS for insomnia and stress, depressive symptoms. Getting updated labs.    Will consider getting updated EEG     Follow-up on EDGARD. This is not being treated.    Patient should contiue vitamin D and folic acid supplementation    Close f/u.      BILLING DOCUMENTATION:     The number of minutes of face-to-face time spent in this encounter was I spent a total of 40 minutes on the day of the visit.  . Over 50% of the time of the visit today was spent on counseling and/or coordination of care wtih the patient and/or family, as outlined above in assessment in plan.    Pablo Johansne MD  Department of Neurology at [facility]  Diplomate of the American Board of Psychiatry and Neurology, General Neurology  Diplomate of American Board of Psychiatry and Neurology, a Member Board of the American Board of Medical Subspecialties, Epilepsy  Director of [facility]'s Level III Comprehensive Epilepsy Program  Professor of Clinical Neurology, [facility].   [address] [facility], [address]  [facility] [id number] [phone]   Fax: [phone]  E-mail: [email or web address]

## 2023-12-04 NOTE — PATIENT INSTRUCTIONS
NEUROLOGY CLINIC VISIT WITH DR. JOHANSEN     PLEASE READ THIS ENTIRE DOCUMENT CAREFULLY AND COMPLETELY:    First and foremost, you matter to Dr. Johansen and you deserve the best care.   Dr. Johansen prides himself on providing the best possible care to all his patients. He strives to make each appointment meaningful, so that all your concerns are being addressed and all your neurological problems are being optimally treated. In order to achieve these goals for everyone, Dr. Johansen has listed important reminders and the best ways to prepare for each appointment. Please read each item carefully. Thank you!    Due to the high volume of patients we are trying to help, your physician will not be able to respond by phone or in [employer] to your routine concerns between appointments.  This does not reflect a lack of interest or concern for you or your diagnosis.  Please bring these questions and concerns to your appointment where your physician can answer.  Please relay more pressing concerns to our office, either via [employer], or by phone; if not able to reach us please visit nearby Urgent Care Center or Emergency Department.  If any emergent medical needs, please seek emergent medical help and/or call 911.    Also, please note that we are not able to fill out paperwork that might be related to your work, utility company, disability, and/or driving, among others, in between the visits.  Please schedule a dedicated appointment to address any and all paperwork.  This is not due to lack of concern or interest for your disease-related work/administrative problems, but to make sure that we provide the best possible care and to fill out your paperwork in a correct, complete, and timely manner.  ------------------------------------------------------------------------------------------  Please let our office know if you have any changes in your seizure frequency and/characteristics.     Please keep a diary of your seizures and bring it  with you to each appointment.    Please take vitamin D3 [id number] internation units daily.     Please abstain from driving until further notice    If you are a biological female with epilepsy who is of reproductive age, who is actively breastfeeding, and/or who infants/young children:  Please take folic acid 1 mg daily. This is an over-the-counter supplement that is recommended to prevent certain developmental problems in your baby, in case you become pregnant in the future.  It is critical that you let our office know as soon as you become pregnant or plan to become pregnant.  If you are caring for a baby/young child, please make sure to be sitting on a soft surface while holding your baby/young child, so in case you have a seizure, your baby/young child is not injured due to fall.   Please let us know if, while breastfeeding, you observe that your baby is excessively sleepy and/or has other behavioral changes. Because many antiseizure medications are collected in breast milk, some nursing babies can suffer adverse medication effects.    Please note that the following might precipitate seizures:   missed doses of antiseizure medications  being sick with a fever, stress  Fatigue  sleep deprivation or abnormal sleeping patterns  not eating regularly  not drinking enough water  drinking too much alcohol  stopping alcohol suddenly if you are currently using it on a regular/daily basis,   using recreational drugs, among others.    Please note that the following might lead to an injury or even be life-threatening in the event you have a seizure and/or lose awareness while:  being in a large body of water by yourself, such as bath, pool, lake, ocean, among others (risk of drowning)  being on unprotected heights (risk of fall)  being around and/or operating heavy machinery (risk of injury)  being around open fire/hot surfaces (risk of burns)  any other activities/circumstances, in which if you lose awareness, you might  injure yourself and/or others.  -------------------------------------------------------------------------------------------  SUDEP (SUDDEN UNEXPECTED DEATH IN EPILEPSY)  It is important that your seizures are well controlled and you have none or have them rarely. In addition to avoiding injury related to breakthrough seizures, frequent seizures increase risk of SUDEP (sudden unexpected death in epilepsy), where a person goes into a seizure and then never wakes up. The best way to prevent SUDEP is to control your seizures well.   ------------------------------------------------------------------------------------------  Please call for help (crisis line and/or 911) in case you have thoughts of harming yourself and/or others.  ------------------------------------------------------------------------------------------  INSTRUCTIONS FOR YOUR FAMILY/CAREGIVERS:  Please call 911 if the patient has a seizure longer than 2-3 minutes, if seizures are back to back without her recovering to her baseline, or she does not start recovering within 5-10 minutes after the seizure stops. During the seizure - please turn her on her side, please make sure her head is protected (for example, you should put a pillow under her head, if one is available), and please do not put anything in her mouth.   ------------------------------------------------------------------------------------------  PATIENT EXPECTATIONS,  IMPORTANT APPOINTMENT REMINDERS, AND ADDITIONAL HELPFUL TIPS:   REFILLS:   Request refills AT LEAST 1 week in advance to ensure you do not run out of medications    MyChart  It is STRONGLY encouraged that ALL patients sign up for MyChart. It is BY FAR the fastest and most convenient way for both Dr. Johansen and patients to obtain timely refills.  If you are having trouble signing up or logging into your account, staff are available to help you. Please ask a medical assistant or staff at the  to assist you.    TEST RESULS:    All labs and diagnostic test results will be reviewed at your next visit, UNLESS  Dr. Johansen determines that there are important findings on the tests need to be acted on sooner. Dr. Johansen will either call or send a message through [employer] if this is the case.    BE PREPARED PRIOR TO EVERY APPOINTMENT:  All patient are responsible for ensuring that ALL test results that were completed outside of the [employer] system have been received by our Neurology Department PRIOR to your appointment with Dr. Johansen.    IMPORTANT:  ALL images (not just the reports) must be sent and uploaded to the [employer] system. Dr. Johansen reviews all images personally prior to each visit. Ensuring that ALL the test results and test images are accessible to Dr. Johansen prior to your appointment is YOUR responsibility and an important part of making the most out of each appointment.   Bring a government-issues picture ID and an updated insurance card EVERY visit.  It is highly recommended that you bring at every visit a list of the most important topics that you want address. While it may not be possible to address all items on the list in a single visit, preparing a list will ensure that Dr. Johansen addresses the items that are most important to you and your health    PAPERWORK, DOCUMENTATION, LETTER REQUESTS:  You must notify the office ahead of your appointment of all paperwork or letter requests.   Please DO NOT wait until the last minute to make these requests. Please give all paperwork to the medical assistant at the start of the appointment and check-in process. Please note that Dr. Johansen may not be able complete some types of documentation in a single appointment or even within a single day or week. This is why it is important to communicate paperwork requests prior to your appointment and at least 2 weeks prior to any deadlines.    KNOW ALL YOU MEDICATIONS:   AT EVERY SINGLE APPOINTMENT, please bring a list of every single prescribed,  non-prescribed, and over the counter medication or supplements you are taking, including ones taken on a rare or intermittent basis.  Include the following information for each prescribed or non-prescribed medications:  Name of medication   The strength of EACH pill/capsule/tablet, etc.   The number of pills/capsules/tablets, etc taken per dose  The number and time of day that doses are taken  For every single Supplement that you take on a routine or intermittent basis, you must include:  The Brand Name   A complete list of every single ingredient, compound, vitamin, and/or mineral in each dose, along with the corresponding amounts/strengths of all ingredients, vitamins, minerals, etc., if such information is provided or known  The number of doses taken per day and time of day doses are taken  If medications are taken on an intermittent or as needed basis, please estimate how many days per week or days per month the medications are used  DO NOT just print out your medication list from [email or web address] or bring a list from a prior appointment or hospitalizations because the information is often often unreliable, inaccurate, outdated, and/or incomplete   The list should be printed or written  If you forget or do not have a list of all the medication, then it is acceptable, although less preferred, to bring all the bottles to the appointment     ARRIVE EARLY FOR ALL VISITS:  Please note that we are unable to accommodate late arrivals as per office policy.  YOU-the patient - (NOT a parent, spouse, or friend) must be physically present at check-in no later than 12 minutes after the scheduled appointment time, or you will be asked to reschedule   Consider scheduling a virtual appointment with Dr. Johansen through [email or web address] as an alternative if transportation to the clinic is difficult or unavailable   Please note, however, that virtual visits can only be scheduled after being an established patient of Dr. Johansen. All new appointments  "must be done in-person in clinic  Some insurances will not cover the cost of virtual appointments. Please check with your insurance to find out if these visits are covered    COMMUNICATING URGENT AND NON-URGENT MATTERS:  Your concerns are important and deserve to be heard and addressed. If you have an urgent matter, there are two methods that will ensure your concerns are prioritized appropriately:   Preferred method: Sign-up/Login to your [employer] account and send a message addressed to Dr. Johansen or Hawa Griffith (Dr. Johansen's assistant). In the subject line, type \"urgent\" followed by a word or phrase describing the situation (For example, write \"Urgent: Out of antiseuzre med and need refill\" or \"Urgent: Severe side effects to new meds\". In doing this, our staff can ensure urgent messages are triaged appropriately and communicated to Dr. Johansen that day.  Call [facility] main line at [phone]. Dr. Johansen's voicemail extension is [phone]. When leaving a voice message, specifically indicate if it is urgent (or non-urgent) so that the matter can be triaged appropriately and addressed in a timely manner    Thank you for entrusting your neurological care to [facility] and we look forward to continuing to serve you.   "

## 2023-12-14 ENCOUNTER — OFFICE VISIT (OUTPATIENT)
Dept: URGENT CARE | Facility: PHYSICIAN GROUP | Age: 28
End: 2023-12-14
Payer: COMMERCIAL

## 2023-12-14 VITALS
DIASTOLIC BLOOD PRESSURE: 82 MMHG | HEIGHT: 59 IN | RESPIRATION RATE: 18 BRPM | BODY MASS INDEX: 33.78 KG/M2 | TEMPERATURE: 98.4 F | WEIGHT: 167.55 LBS | SYSTOLIC BLOOD PRESSURE: 112 MMHG | OXYGEN SATURATION: 97 % | HEART RATE: 93 BPM

## 2023-12-14 DIAGNOSIS — J10.1 INFLUENZA A: ICD-10-CM

## 2023-12-14 DIAGNOSIS — R68.89 FLU-LIKE SYMPTOMS: ICD-10-CM

## 2023-12-14 DIAGNOSIS — R05.1 ACUTE COUGH: ICD-10-CM

## 2023-12-14 LAB
FLUAV RNA SPEC QL NAA+PROBE: POSITIVE
FLUBV RNA SPEC QL NAA+PROBE: NEGATIVE
RSV RNA SPEC QL NAA+PROBE: NEGATIVE
SARS-COV-2 RNA RESP QL NAA+PROBE: NEGATIVE

## 2023-12-14 PROCEDURE — 0241U POCT CEPHEID COV-2, FLU A/B, RSV - PCR: CPT | Performed by: FAMILY MEDICINE

## 2023-12-14 PROCEDURE — 99213 OFFICE O/P EST LOW 20 MIN: CPT | Performed by: FAMILY MEDICINE

## 2023-12-14 PROCEDURE — 3074F SYST BP LT 130 MM HG: CPT | Performed by: FAMILY MEDICINE

## 2023-12-14 PROCEDURE — 3079F DIAST BP 80-89 MM HG: CPT | Performed by: FAMILY MEDICINE

## 2023-12-14 RX ORDER — OSELTAMIVIR PHOSPHATE 75 MG/1
75 CAPSULE ORAL 2 TIMES DAILY
Qty: 10 CAPSULE | Refills: 0 | Status: SHIPPED | OUTPATIENT
Start: 2023-12-14 | End: 2024-03-04

## 2023-12-14 RX ORDER — BENZONATATE 100 MG/1
100 CAPSULE ORAL 3 TIMES DAILY PRN
Qty: 60 CAPSULE | Refills: 0 | Status: SHIPPED | OUTPATIENT
Start: 2023-12-14 | End: 2024-03-04

## 2023-12-14 ASSESSMENT — ENCOUNTER SYMPTOMS
COUGH: 1
CARDIOVASCULAR NEGATIVE: 1
EYES NEGATIVE: 1
FEVER: 1

## 2023-12-14 ASSESSMENT — FIBROSIS 4 INDEX: FIB4 SCORE: 0.33

## 2023-12-15 NOTE — PROGRESS NOTES
"Subjective:   Orin Rodas is a 28 y.o. female who presents for Cough (DRY  COUGH; FEVER; CHEST DISCOMFORT; CONGESTION ,BODY ACHES  x 2 DAY)      Cough  Associated symptoms include a fever.       Review of Systems   Constitutional:  Positive for fever and malaise/fatigue.   HENT:  Positive for congestion.    Eyes: Negative.    Respiratory:  Positive for cough.    Cardiovascular: Negative.        Medications, Allergies, and current problem list reviewed today in Epic.     Objective:     /82   Pulse 93   Temp 36.9 °C (98.4 °F) (Temporal)   Resp 18   Ht 1.499 m (4' 11\")   Wt 76 kg (167 lb 8.8 oz)   SpO2 97%     Physical Exam  Vitals reviewed.   Constitutional:       Appearance: She is ill-appearing.   HENT:      Head: Normocephalic and atraumatic.      Right Ear: Tympanic membrane normal.      Left Ear: Tympanic membrane normal.      Nose: Congestion present.      Mouth/Throat:      Pharynx: Oropharynx is clear.   Cardiovascular:      Rate and Rhythm: Normal rate and regular rhythm.      Pulses: Normal pulses.      Heart sounds: Normal heart sounds.   Pulmonary:      Effort: Pulmonary effort is normal.      Breath sounds: Normal breath sounds.   Abdominal:      General: Abdomen is flat. Bowel sounds are normal.      Palpations: Abdomen is soft.   Lymphadenopathy:      Cervical: No cervical adenopathy.   Neurological:      Mental Status: She is alert.         Assessment/Plan:     Diagnosis and associated orders:     1. Flu-like symptoms  POCT CEPHEID COV-2, FLU A/B, RSV - PCR      2. Acute cough  benzonatate (TESSALON) 100 MG Cap      3. Influenza A  oseltamivir (TAMIFLU) 75 MG Cap         Comments/MDM:     sudafed         Differential diagnosis, natural history, supportive care, and indications for immediate follow-up discussed.    Advised the patient to follow-up with the primary care physician for recheck, reevaluation, and consideration of further management.    Please note that this dictation was " created using voice recognition software. I have made a reasonable attempt to correct obvious errors, but I expect that there are errors of grammar and possibly content that I did not discover before finalizing the note.    This note was electronically signed by Bandar Barraza M.D.

## 2024-02-24 ENCOUNTER — HOSPITAL ENCOUNTER (OUTPATIENT)
Dept: LAB | Facility: MEDICAL CENTER | Age: 29
End: 2024-02-24
Attending: INTERNAL MEDICINE
Payer: COMMERCIAL

## 2024-02-24 ENCOUNTER — HOSPITAL ENCOUNTER (OUTPATIENT)
Dept: LAB | Facility: MEDICAL CENTER | Age: 29
End: 2024-02-24
Attending: STUDENT IN AN ORGANIZED HEALTH CARE EDUCATION/TRAINING PROGRAM
Payer: COMMERCIAL

## 2024-02-24 DIAGNOSIS — R73.9 HYPERGLYCEMIA: Chronic | ICD-10-CM

## 2024-02-24 DIAGNOSIS — Z11.59 NEED FOR HEPATITIS C SCREENING TEST: ICD-10-CM

## 2024-02-24 DIAGNOSIS — R56.9 CONVULSIONS, UNSPECIFIED CONVULSION TYPE (HCC): ICD-10-CM

## 2024-02-24 DIAGNOSIS — E78.5 DYSLIPIDEMIA: Chronic | ICD-10-CM

## 2024-02-24 LAB
25(OH)D3 SERPL-MCNC: 25 NG/ML (ref 30–100)
ALBUMIN SERPL BCP-MCNC: 4.1 G/DL (ref 3.2–4.9)
ALBUMIN/GLOB SERPL: 1.2 G/DL
ALP SERPL-CCNC: 101 U/L (ref 30–99)
ALT SERPL-CCNC: 16 U/L (ref 2–50)
ANION GAP SERPL CALC-SCNC: 12 MMOL/L (ref 7–16)
ANION GAP SERPL CALC-SCNC: 13 MMOL/L (ref 7–16)
AST SERPL-CCNC: 17 U/L (ref 12–45)
BILIRUB SERPL-MCNC: 0.3 MG/DL (ref 0.1–1.5)
BUN SERPL-MCNC: 7 MG/DL (ref 8–22)
BUN SERPL-MCNC: 7 MG/DL (ref 8–22)
CALCIUM ALBUM COR SERPL-MCNC: 9.2 MG/DL (ref 8.5–10.5)
CALCIUM SERPL-MCNC: 9.1 MG/DL (ref 8.5–10.5)
CALCIUM SERPL-MCNC: 9.3 MG/DL (ref 8.5–10.5)
CHLORIDE SERPL-SCNC: 106 MMOL/L (ref 96–112)
CHLORIDE SERPL-SCNC: 107 MMOL/L (ref 96–112)
CHOLEST SERPL-MCNC: 142 MG/DL (ref 100–199)
CO2 SERPL-SCNC: 17 MMOL/L (ref 20–33)
CO2 SERPL-SCNC: 17 MMOL/L (ref 20–33)
CREAT SERPL-MCNC: 0.61 MG/DL (ref 0.5–1.4)
CREAT SERPL-MCNC: 0.73 MG/DL (ref 0.5–1.4)
EST. AVERAGE GLUCOSE BLD GHB EST-MCNC: 94 MG/DL
GFR SERPLBLD CREATININE-BSD FMLA CKD-EPI: 115 ML/MIN/1.73 M 2
GFR SERPLBLD CREATININE-BSD FMLA CKD-EPI: 125 ML/MIN/1.73 M 2
GLOBULIN SER CALC-MCNC: 3.4 G/DL (ref 1.9–3.5)
GLUCOSE SERPL-MCNC: 78 MG/DL (ref 65–99)
GLUCOSE SERPL-MCNC: 81 MG/DL (ref 65–99)
HBA1C MFR BLD: 4.9 % (ref 4–5.6)
HCV AB SER QL: NORMAL
HDLC SERPL-MCNC: 37 MG/DL
LDLC SERPL CALC-MCNC: 87 MG/DL
POTASSIUM SERPL-SCNC: 3.7 MMOL/L (ref 3.6–5.5)
POTASSIUM SERPL-SCNC: 3.8 MMOL/L (ref 3.6–5.5)
PROT SERPL-MCNC: 7.5 G/DL (ref 6–8.2)
SODIUM SERPL-SCNC: 136 MMOL/L (ref 135–145)
SODIUM SERPL-SCNC: 136 MMOL/L (ref 135–145)
TRIGL SERPL-MCNC: 89 MG/DL (ref 0–149)

## 2024-02-24 PROCEDURE — 80061 LIPID PANEL: CPT

## 2024-02-24 PROCEDURE — 36415 COLL VENOUS BLD VENIPUNCTURE: CPT

## 2024-02-24 PROCEDURE — 80053 COMPREHEN METABOLIC PANEL: CPT

## 2024-02-24 PROCEDURE — 80048 BASIC METABOLIC PNL TOTAL CA: CPT

## 2024-02-24 PROCEDURE — 80203 DRUG SCREEN QUANT ZONISAMIDE: CPT

## 2024-02-24 PROCEDURE — 83036 HEMOGLOBIN GLYCOSYLATED A1C: CPT

## 2024-02-24 PROCEDURE — 86803 HEPATITIS C AB TEST: CPT

## 2024-02-24 PROCEDURE — 82306 VITAMIN D 25 HYDROXY: CPT

## 2024-02-24 PROCEDURE — 85025 COMPLETE CBC W/AUTO DIFF WBC: CPT

## 2024-02-26 ENCOUNTER — OFFICE VISIT (OUTPATIENT)
Dept: URGENT CARE | Facility: PHYSICIAN GROUP | Age: 29
End: 2024-02-26
Payer: COMMERCIAL

## 2024-02-26 ENCOUNTER — APPOINTMENT (OUTPATIENT)
Dept: URGENT CARE | Facility: PHYSICIAN GROUP | Age: 29
End: 2024-02-26
Payer: COMMERCIAL

## 2024-02-26 VITALS
DIASTOLIC BLOOD PRESSURE: 60 MMHG | WEIGHT: 154.32 LBS | SYSTOLIC BLOOD PRESSURE: 108 MMHG | OXYGEN SATURATION: 96 % | RESPIRATION RATE: 16 BRPM | BODY MASS INDEX: 31.11 KG/M2 | HEIGHT: 59 IN | TEMPERATURE: 97.9 F | HEART RATE: 90 BPM

## 2024-02-26 DIAGNOSIS — J06.9 VIRAL URI WITH COUGH: ICD-10-CM

## 2024-02-26 LAB
FLUAV RNA SPEC QL NAA+PROBE: NEGATIVE
FLUBV RNA SPEC QL NAA+PROBE: NEGATIVE
RSV RNA SPEC QL NAA+PROBE: NEGATIVE
S PYO DNA SPEC NAA+PROBE: NOT DETECTED
SARS-COV-2 RNA RESP QL NAA+PROBE: NEGATIVE

## 2024-02-26 PROCEDURE — 99213 OFFICE O/P EST LOW 20 MIN: CPT

## 2024-02-26 PROCEDURE — 0241U POCT CEPHEID COV-2, FLU A/B, RSV - PCR: CPT

## 2024-02-26 PROCEDURE — 87651 STREP A DNA AMP PROBE: CPT

## 2024-02-26 PROCEDURE — 3078F DIAST BP <80 MM HG: CPT

## 2024-02-26 PROCEDURE — 3074F SYST BP LT 130 MM HG: CPT

## 2024-02-26 ASSESSMENT — ENCOUNTER SYMPTOMS
MYALGIAS: 1
CHILLS: 1
SORE THROAT: 1
COUGH: 1

## 2024-02-26 ASSESSMENT — FIBROSIS 4 INDEX: FIB4 SCORE: 0.41

## 2024-02-26 NOTE — PROGRESS NOTES
Subjective:   Orin Rodas is a 28 y.o. female who presents for Cough (X 12 hours and states she has a sore throat and hurts to swallow)      HPI: This is a 27 yo female who presents today for cough, sore throat, body aches, and chills. She reports that her symptoms developed 3 days ago. She has been taking nyquil for her symptoms. She reports moderate pain with swallowing. She works at  and has several recent sick contacts.       Review of Systems   Constitutional:  Positive for chills.   HENT:  Positive for sore throat.    Respiratory:  Positive for cough.    Musculoskeletal:  Positive for myalgias.       Medications:    Current Outpatient Medications on File Prior to Visit   Medication Sig Dispense Refill    mirtazapine (REMERON) 30 MG Tab tablet Take 1 Tablet by mouth every evening for 180 days. 30 Tablet 5    zonisamide (ZONEGRAN) 100 MG Cap Take 1 Capsule by mouth every day for 360 days. TAKE 1 CAPSULE BY MOUTH IN THE MORNING AND 3 CAPSULES AT BEDTIME 90 Capsule 3    folic acid (FOLVITE) 1 MG Tab Take 1 Tablet by mouth every day. 90 Tablet 3    benzonatate (TESSALON) 100 MG Cap Take 1 Capsule by mouth 3 times a day as needed for Cough. (Patient not taking: Reported on 2/26/2024) 60 Capsule 0    oseltamivir (TAMIFLU) 75 MG Cap Take 1 Capsule by mouth 2 times a day. (Patient not taking: Reported on 2/26/2024) 10 Capsule 0     No current facility-administered medications on file prior to visit.        Allergies:   Patient has no known allergies.    Problem List:   Patient Active Problem List   Diagnosis    Bilateral low back pain without sciatica    Seizure (HCC)    Hyperglycemia    Overweight    Cervicalgia    COVID-19 virus infection    Dyslipidemia    Hair loss    Headache    History of UTI    Depression    Obstructive sleep apnea syndrome    Need for vaccination    Primary insomnia        Surgical History:  No past surgical history on file.    Past Social Hx:   Social History     Tobacco Use     "Smoking status: Some Days     Current packs/day: 0.00     Types: Cigarettes     Last attempt to quit: 2021     Years since quittin.2    Smokeless tobacco: Never    Tobacco comments:     Hookah    Vaping Use    Vaping Use: Former    Substances: Flavoring   Substance Use Topics    Alcohol use: No     Alcohol/week: 0.0 oz    Drug use: No          Problem list, medications, and allergies reviewed by myself today in Epic.     Objective:     /60 (BP Location: Right arm, Patient Position: Sitting, BP Cuff Size: Adult)   Pulse 90   Temp 36.6 °C (97.9 °F) (Temporal)   Resp 16   Ht 1.499 m (4' 11\")   Wt 70 kg (154 lb 5.2 oz)   SpO2 96%   BMI 31.17 kg/m²     Physical Exam  Vitals and nursing note reviewed.   Constitutional:       General: She is not in acute distress.     Appearance: Normal appearance. She is normal weight. She is not ill-appearing, toxic-appearing or diaphoretic.   HENT:      Head: Normocephalic and atraumatic.      Right Ear: Tympanic membrane, ear canal and external ear normal. There is no impacted cerumen.      Left Ear: Tympanic membrane, ear canal and external ear normal. There is no impacted cerumen.      Nose: Nose normal. No congestion or rhinorrhea.      Mouth/Throat:      Mouth: Mucous membranes are moist.      Pharynx: Oropharynx is clear. Uvula midline. Posterior oropharyngeal erythema present. No pharyngeal swelling or oropharyngeal exudate.      Tonsils: No tonsillar exudate. 0 on the right. 0 on the left.   Cardiovascular:      Rate and Rhythm: Normal rate and regular rhythm.      Pulses: Normal pulses.      Heart sounds: Normal heart sounds. No murmur heard.     No friction rub. No gallop.   Pulmonary:      Effort: Pulmonary effort is normal. No respiratory distress.      Breath sounds: Normal breath sounds. No stridor. No wheezing, rhonchi or rales.   Chest:      Chest wall: No tenderness.   Musculoskeletal:      Cervical back: Neck supple. No tenderness. "   Lymphadenopathy:      Cervical: No cervical adenopathy.   Skin:     General: Skin is warm and dry.      Capillary Refill: Capillary refill takes less than 2 seconds.   Neurological:      General: No focal deficit present.      Mental Status: She is alert and oriented to person, place, and time. Mental status is at baseline.      Cranial Nerves: No cranial nerve deficit.      Motor: No weakness.      Gait: Gait normal.   Psychiatric:         Mood and Affect: Mood normal.         Behavior: Behavior normal.         Thought Content: Thought content normal.         Judgment: Judgment normal.         Assessment/Plan:     Diagnosis and associated orders:      1. Viral URI with cough  POCT CoV-2, Flu A/B, RSV by PCR    POCT GROUP A STREP, PCR         Results for orders placed or performed in visit on 02/26/24   POCT CoV-2, Flu A/B, RSV by PCR   Result Value Ref Range    SARS-CoV-2 by PCR Negative Negative, Invalid    Influenza virus A RNA Negative Negative, Invalid    Influenza virus B, PCR Negative Negative, Invalid    RSV, PCR Negative Negative, Invalid   POCT GROUP A STREP, PCR   Result Value Ref Range    POC Group A Strep, PCR Not Detected Not Detected, Invalid       Comments/MDM:   Pt is clinically stable at today's acute urgent care visit.  No acute distress noted. Appropriate for outpatient management at this time.     Acute problem.  Strep, COVID, influenza, RSV are all negative.  These results were released to patient's MyChart. I have discussed with patient that symptoms are viral in etiology. I have recommended supportive care to include; Increased fluids, rest, over-the-counter cold and flu medications, alternating Tylenol and/or ibuprofen per manufactures instructions for symptomatic relief and fevers, and the use of a humidifier. Patient is to return to  or go to ER for any new or worsening s/s, and follow up with PCP for recheck. Patient is agreeable with plan of care and verbalized good understanding.               Discussed DDx, management options (risks,benefits, and alternatives to planned treatment), natural progression and supportive care.  Expressed understanding and the treatment plan was agreed upon. Questions were encouraged and answered   Return to urgent care prn if new or worsening sx or if there is no improvement in condition prn.    Educated in Red flags and indications to immediately call 911 or present to the Emergency Department.   Advised the patient to follow-up with the primary care physician for recheck, reevaluation, and consideration of further management.    I personally reviewed prior external notes and test results pertinent to today's visit.  I have independently reviewed and interpreted all diagnostics ordered during this urgent care acute visit.     Please note that this dictation was created using voice recognition software. I have made a reasonable attempt to correct obvious errors, but I expect that there are errors of grammar and possibly content that I did not discover before finalizing the note.    This note was electronically signed by JERI Earl

## 2024-02-26 NOTE — LETTER
February 26, 2024    To Whom It May Concern:         This is confirmation that Orin Mc Luciajohn attended her scheduled appointment with SIMEON Sun on 2/26/24. Please excuse her from work 2/26/24-2/27/24.         If you have any questions please do not hesitate to call me at the phone number listed below.    Sincerely,          Isabella Swenson A.P.R.N.  [phone]

## 2024-02-27 LAB — ZONISAMIDE SERPL-MCNC: 16 UG/ML (ref 10–40)

## 2024-03-02 ENCOUNTER — HOSPITAL ENCOUNTER (OUTPATIENT)
Dept: LAB | Facility: MEDICAL CENTER | Age: 29
End: 2024-03-02
Attending: STUDENT IN AN ORGANIZED HEALTH CARE EDUCATION/TRAINING PROGRAM
Payer: COMMERCIAL

## 2024-03-02 LAB
BASOPHILS # BLD AUTO: 0.4 % (ref 0–1.8)
BASOPHILS # BLD: 0.03 K/UL (ref 0–0.12)
EOSINOPHIL # BLD AUTO: 0.09 K/UL (ref 0–0.51)
EOSINOPHIL NFR BLD: 1.3 % (ref 0–6.9)
ERYTHROCYTE [DISTWIDTH] IN BLOOD BY AUTOMATED COUNT: 47.2 FL (ref 35.9–50)
HCT VFR BLD AUTO: 48.4 % (ref 37–47)
HGB BLD-MCNC: 16.1 G/DL (ref 12–16)
IMM GRANULOCYTES # BLD AUTO: 0.01 K/UL (ref 0–0.11)
IMM GRANULOCYTES NFR BLD AUTO: 0.1 % (ref 0–0.9)
LYMPHOCYTES # BLD AUTO: 3.07 K/UL (ref 1–4.8)
LYMPHOCYTES NFR BLD: 44 % (ref 22–41)
MCH RBC QN AUTO: 31.4 PG (ref 27–33)
MCHC RBC AUTO-ENTMCNC: 33.3 G/DL (ref 32.2–35.5)
MCV RBC AUTO: 94.3 FL (ref 81.4–97.8)
MONOCYTES # BLD AUTO: 0.42 K/UL (ref 0–0.85)
MONOCYTES NFR BLD AUTO: 6 % (ref 0–13.4)
NEUTROPHILS # BLD AUTO: 3.35 K/UL (ref 1.82–7.42)
NEUTROPHILS NFR BLD: 48.2 % (ref 44–72)
NRBC # BLD AUTO: 0 K/UL
NRBC BLD-RTO: 0 /100 WBC (ref 0–0.2)
PLATELET # BLD AUTO: 253 K/UL (ref 164–446)
PMV BLD AUTO: 10.9 FL (ref 9–12.9)
RBC # BLD AUTO: 5.13 M/UL (ref 4.2–5.4)
WBC # BLD AUTO: 7 K/UL (ref 4.8–10.8)

## 2024-03-02 PROCEDURE — 85025 COMPLETE CBC W/AUTO DIFF WBC: CPT

## 2024-03-04 ENCOUNTER — OFFICE VISIT (OUTPATIENT)
Dept: NEUROLOGY | Facility: MEDICAL CENTER | Age: 29
End: 2024-03-04
Attending: STUDENT IN AN ORGANIZED HEALTH CARE EDUCATION/TRAINING PROGRAM
Payer: COMMERCIAL

## 2024-03-04 VITALS
HEART RATE: 87 BPM | SYSTOLIC BLOOD PRESSURE: 116 MMHG | HEIGHT: 59 IN | TEMPERATURE: 97.5 F | DIASTOLIC BLOOD PRESSURE: 76 MMHG | OXYGEN SATURATION: 98 % | WEIGHT: 158.07 LBS | BODY MASS INDEX: 31.87 KG/M2

## 2024-03-04 DIAGNOSIS — Z65.8 PSYCHOSOCIAL STRESSORS: ICD-10-CM

## 2024-03-04 DIAGNOSIS — R56.9 CONVULSIONS, UNSPECIFIED CONVULSION TYPE (HCC): ICD-10-CM

## 2024-03-04 DIAGNOSIS — R79.89 LOW VITAMIN D LEVEL: ICD-10-CM

## 2024-03-04 DIAGNOSIS — F41.9 ANXIETY: ICD-10-CM

## 2024-03-04 DIAGNOSIS — F32.A DEPRESSION, UNSPECIFIED DEPRESSION TYPE: ICD-10-CM

## 2024-03-04 DIAGNOSIS — R56.9 SEIZURE (HCC): ICD-10-CM

## 2024-03-04 DIAGNOSIS — M54.2 CERVICALGIA: ICD-10-CM

## 2024-03-04 DIAGNOSIS — G93.89 CEREBRAL VENTRICULOMEGALY: ICD-10-CM

## 2024-03-04 DIAGNOSIS — M54.50 BILATERAL LOW BACK PAIN WITHOUT SCIATICA, UNSPECIFIED CHRONICITY: ICD-10-CM

## 2024-03-04 DIAGNOSIS — G44.89 OTHER HEADACHE SYNDROME: ICD-10-CM

## 2024-03-04 DIAGNOSIS — F51.01 PRIMARY INSOMNIA: ICD-10-CM

## 2024-03-04 DIAGNOSIS — G47.33 OBSTRUCTIVE SLEEP APNEA SYNDROME: ICD-10-CM

## 2024-03-04 PROCEDURE — 99417 PROLNG OP E/M EACH 15 MIN: CPT | Performed by: STUDENT IN AN ORGANIZED HEALTH CARE EDUCATION/TRAINING PROGRAM

## 2024-03-04 PROCEDURE — 3078F DIAST BP <80 MM HG: CPT | Performed by: STUDENT IN AN ORGANIZED HEALTH CARE EDUCATION/TRAINING PROGRAM

## 2024-03-04 PROCEDURE — 99215 OFFICE O/P EST HI 40 MIN: CPT | Performed by: STUDENT IN AN ORGANIZED HEALTH CARE EDUCATION/TRAINING PROGRAM

## 2024-03-04 PROCEDURE — 99212 OFFICE O/P EST SF 10 MIN: CPT | Performed by: STUDENT IN AN ORGANIZED HEALTH CARE EDUCATION/TRAINING PROGRAM

## 2024-03-04 PROCEDURE — 3074F SYST BP LT 130 MM HG: CPT | Performed by: STUDENT IN AN ORGANIZED HEALTH CARE EDUCATION/TRAINING PROGRAM

## 2024-03-04 RX ORDER — VALACYCLOVIR HYDROCHLORIDE 500 MG/1
TABLET, FILM COATED ORAL
COMMUNITY
Start: 2024-03-04

## 2024-03-04 RX ORDER — MIDAZOLAM 5 MG/.1ML
5 SPRAY NASAL PRN
Qty: 4 EACH | Refills: 0 | Status: SHIPPED | OUTPATIENT
Start: 2024-03-04 | End: 2024-03-10

## 2024-03-04 ASSESSMENT — FIBROSIS 4 INDEX: FIB4 SCORE: 0.45

## 2024-03-04 ASSESSMENT — PATIENT HEALTH QUESTIONNAIRE - PHQ9: CLINICAL INTERPRETATION OF PHQ2 SCORE: 0

## 2024-03-04 NOTE — LETTER
March 4, 2024    To Whom It May Concern:    Orin Rodas is my patient whom I treat for a chronic neurological condition which has occasional flairs. These flairs can render her physically and mentally incapacitated for 24 hours on average but can be up to 48 hours. During these times of incapacitation, she will be unable to work and will require rest to ensure complete recovery.     We have identified specific triggers that can bring on the flairs. Triggers include sleep disruption, lack of sleep, or any form of sleep dysregulation; excessive or prolonged physical or psychological stress; and physical or psychological exhaustion. For these reasons, I recommend that reasonable accommodations be made to avoid these known triggers. Specifically, I recommend that Ms. Rodas work no more than 8 hours per shift. Also, it is important that she adheres to  [predictable and consistent work schedule, such that the work day begins and ends at the same time each day. The unpredictability and variability in work schedule secondarily disrupts sleep, which, as mentioned above, is one of her well-known triggers.     I believe that such accommodations are medically necessary in order to ensure the safety and well-being of my patient. I recommend that these reasonable accommodations start as of the date of this letter and continue for 12 months from the date of this letter. Please note, however, that depending on her clinical trajectory, these recommendations may need to be modified      Thank you for your attention to this very important matter.     Sincerely,        Pablo Johansen MD      Department of Neurology at [facility]  Diplomate of the American Board of Psychiatry and Neurology, General Neurology  Diplomate of American Board of Psychiatry and Neurology, a Member Board of the American Board of Medical Subspecialties, Epilepsy  Director of [facility] Level III Comprehensive Epilepsy  Program  Professor of Clinical Neurology, [facility].   Number: [phone]  Fax: [phone]  E-mail: [email or web address]

## 2024-03-04 NOTE — PROGRESS NOTES
NEUROLOGY FOLLOW-UP - 03/04/2024     REASON FOR VISIT: Orin Rodas 28 y.o. female presents today for follow-up       SUMMARY RELEVANT PAST MEDICAL HISTORY AND/OR COMPENDIUM OF RELEVANT WORK-UP AND TREATMENTS TO DATE:      Sept 2023: She had gone almost 1 year without a seizure. Then in September 2023 she had 2 convulsions 5 hours apart, witnessed by s/o. Convulsions occurred in the setting of stress (her sister recently passed away) and sleep deprivation. S/o who is present showed me a video of one of her seizures which showed her body twitching/shaking off and on, arrhythmically, grunting, eyes closed. It appeared nonepileptic.     INTERVAL HISTORY:  She had another convulsion, this time at work. She fell, struck her head and was apparently post-ictal for 24 hours or more. She does not remember much about that day. No obvious prodrome or aura. This occurred in mid/early January (?) . No other major events since that one. She does have intermittent smaller events, sometimes associated with sudden onset anxiety/fear/panic    She has been working hard to maintain a healthier lifestyle. She has been eating healthier, has lost weight, has been practicing better sleep habits.    She has a diagnosis of EDGARD and this still is not being treated.    She is only on zonisamde 100 mg per night. She feels better being on a lower dose of zonisamde. She has never had a meaningful and sustained benefit with any antiseizure medicine.    She is doing well on higher dose of mirtazapine. She has noticed an improvement in her mood and she thinks it does help her fall asleep.    She has also cut back on alcohol and regular caffeine consumption.        CURRENT MEDICATIONS AT THE TIME OF THIS ENCOUNTER:  Current Outpatient Medications on File Prior to Visit   Medication Sig Dispense Refill    valACYclovir (VALTREX) 500 MG Tab       mirtazapine (REMERON) 30 MG Tab tablet Take 1 Tablet by mouth every evening for 180 days. 30 Tablet 5  "   zonisamide (ZONEGRAN) 100 MG Cap Take 1 Capsule by mouth every day for 360 days. TAKE 1 CAPSULE BY MOUTH IN THE MORNING AND 3 CAPSULES AT BEDTIME 90 Capsule 3    folic acid (FOLVITE) 1 MG Tab Take 1 Tablet by mouth every day. 90 Tablet 3     No current facility-administered medications on file prior to visit.          EXAM:   /76 (BP Location: Right arm, Patient Position: Sitting, BP Cuff Size: Adult)   Pulse 87   Temp 36.4 °C (97.5 °F) (Temporal)   Ht 1.499 m (4' 11\")   Wt 71.7 kg (158 lb 1.1 oz)   SpO2 98%    Wt Readings from Last 5 Encounters:   03/04/24 71.7 kg (158 lb 1.1 oz)   02/26/24 70 kg (154 lb 5.2 oz)   12/14/23 76 kg (167 lb 8.8 oz)   12/04/23 76.2 kg (167 lb 15.9 oz)   09/12/23 80.7 kg (178 lb)      Physical Exam:  Physical Exam  Constitutional:       General: She is not in acute distress.  HENT:      Head: Normocephalic.   Eyes:      Extraocular Movements: EOM normal. No nystagmus.   Neurological:      Mental Status: She is alert.      Motor: Motor strength is normal.  Psychiatric:         Speech: Speech normal.        Neurological Exam   Neurological Exam  Mental Status  Alert. Speech is normal. Language is fluent with no aphasia. Attention and concentration are normal.    Cranial Nerves  CN III, IV, VI: Extraocular movements intact bilaterally. No nystagmus. Normal saccades. Normal smooth pursuit.  CN V: Facial sensation is normal.  CN VII: Full and symmetric facial movement.  CN IX, X: Palate elevates symmetrically  CN XI: Shoulder shrug strength is normal.  CN XII: Tongue midline without atrophy or fasciculations.    Motor  Normal muscle bulk throughout. No fasciculations present. Normal muscle tone. No abnormal involuntary movements. Strength is 5/5 throughout all four extremities.    Sensory  Light touch is normal in upper and lower extremities.     Coordination  Right: Finger-to-nose normal.Left: Finger-to-nose normal.    Gait  Casual gait is normal including stance, stride, and " arm swing.         ASSESSMENT, EDUCATION, AND COUNSELING:  This is a 28 y.o. female patient who presents to the neurology clinic. We had an extensive discussion about the patient's symptoms, signs, and work-up to date, if any. We discussed potential and/or definitive diagnoses, work-up, and potential treatments.     PLAN:  If applicable, the work-up such as labs, imaging, procedures, and/or other testing, referrals, and/or recommended treatment strategies are listed below.    Medications were administered today in clinic (if any):     Visit Diagnoses     ICD-10-CM   1. Seizure (HCC)  R56.9   2. Convulsions, unspecified convulsion type (HCC)  R56.9   3. Obstructive sleep apnea syndrome  G47.33   4. Primary insomnia  F51.01   5. Depression, unspecified depression type  F32.A   6. Other headache syndrome  G44.89   7. Psychosocial stressors  Z65.8   8. Low vitamin D level  R79.89   9. Anxiety  F41.9   10. Cervicalgia  M54.2   11. Cerebral ventriculomegaly  G93.89   12. Bilateral low back pain without sciatica, unspecified chronicity  M54.50      Orders Placed This Encounter    Referral to Neurodiagnostics (EEG,EP,EMG/NCS/DBS)    valACYclovir (VALTREX) 500 MG Tab    Midazolam (NAYZILAM) 5 MG/0.1ML Solution        Patient needs 5 Day EMU stay. This is medically necessary.  She has ongoing disabling spells that are impacting her job, causing physical injury, and degrading her quality of life. These clinical events need to be captured so that a diagnosis can be definiibely made and appropraite treatments be given. I am suspicious for nonepileptic attacks vs mixed epileptic  and nonepileptic  attacks.     I asked that the patient stop taking her one antiseiszure medication (zonisimade 100 mg once per night) the Friday before she is to be admitted on the Monday of the EMU admission. She will therefore arrive on Monday off of all antiseizure medication.    Will plan to include bilateral inferior temporal leads.    She is happy  with the mirtazapine dose she is on. No changes recommended.    I will resend Nayzilam to [facility]. I do think that this rescue therapy is medically necessary given her occasional convulsions , some of which have resulted in injury. She needs this abortive/rescue treatment so that any prolonged convulsion can be terminated. It will also prevent her from having clusters of seizures.    Letter written today for work accommodations. I recommended that she also talk with her HR department to see about [place] paperwork. We can plan to complete this during the EMU admission. If she needs it sooner than that then she will need a separate appointment.            BILLING DOCUMENTATION:     The number of minutes of face-to-face time spent in this encounter was I spent a total of 75 minutes on the day of the visit.  . Over 50% of the time of the visit today was spent on counseling and/or coordination of care wtih the patient and/or family, as outlined above in assessment in plan.    Pablo Johansen MD  Department of Neurology at [facility]  Diplomate of the American Board of Psychiatry and Neurology, General Neurology  Diplomate of American Board of Psychiatry and Neurology, a Member Board of the American Board of Medical Subspecialties, Epilepsy  Director of [facility] Level III Comprehensive Epilepsy Program  Professor of Clinical Neurology, [facility].   [address], [address]  [facility] [id number] [phone]   Fax: [phone]  E-mail: [email or web address]

## 2024-03-05 ENCOUNTER — TELEPHONE (OUTPATIENT)
Dept: PHARMACY | Facility: MEDICAL CENTER | Age: 29
End: 2024-03-05
Payer: COMMERCIAL

## 2024-03-05 NOTE — TELEPHONE ENCOUNTER
Received [employer] PA request via [employer]  for Midazolam (NAYZILAM) 5 MG/0.1ML Solution. (Quantity:4, Day Supply:30)     Insurance: [facility]  Member ID:  [id number]  BIN: [id number]  PCN: [id number]  Group: [id number]     Ran Test claim via [place]   unable to TC (Payor link issue) NO PA req'd PER CMM,

## 2024-03-05 NOTE — PATIENT INSTRUCTIONS
NEUROLOGY CLINIC VISIT WITH DR. JOHANSEN     PLEASE READ THIS ENTIRE DOCUMENT CAREFULLY AND COMPLETELY:    First and foremost, you matter to Dr. Johansen and you deserve the best care.   Dr. Johansen prides himself on providing the best possible care to all his patients. He strives to make each appointment meaningful, so that all your concerns are being addressed and all your neurological problems are being optimally treated. In order to achieve these goals for everyone, Dr. Johansen has listed important reminders and the best ways to prepare for each appointment. Please read each item carefully. Thank you!    Due to the high volume of patients we are trying to help, your physician will not be able to respond by phone or in [employer] to your routine concerns between appointments.  This does not reflect a lack of interest or concern for you or your diagnosis.  Please bring these questions and concerns to your appointment where your physician can answer.  Please relay more pressing concerns to our office, either via [employer], or by phone; if not able to reach us please visit nearby Urgent Care Center or Emergency Department.  If any emergent medical needs, please seek emergent medical help and/or call 911.    Also, please note that we are not able to fill out paperwork that might be related to your work, utility company, disability, and/or driving, among others, in between the visits.  Please schedule a dedicated appointment to address any and all paperwork.  This is not due to lack of concern or interest for your disease-related work/administrative problems, but to make sure that we provide the best possible care and to fill out your paperwork in a correct, complete, and timely manner.  ------------------------------------------------------------------------------------------  Please let our office know if you have any changes in your seizure frequency and/characteristics.     Please keep a diary of your seizures and bring it  with you to each appointment.    Please take vitamin D3 [id number] internation units daily.     Please abstain from driving until further notice    If you are a biological female with epilepsy who is of reproductive age, who is actively breastfeeding, and/or who infants/young children:  Please take folic acid 1 mg daily. This is an over-the-counter supplement that is recommended to prevent certain developmental problems in your baby, in case you become pregnant in the future.  It is critical that you let our office know as soon as you become pregnant or plan to become pregnant.  If you are caring for a baby/young child, please make sure to be sitting on a soft surface while holding your baby/young child, so in case you have a seizure, your baby/young child is not injured due to fall.   Please let us know if, while breastfeeding, you observe that your baby is excessively sleepy and/or has other behavioral changes. Because many antiseizure medications are collected in breast milk, some nursing babies can suffer adverse medication effects.    Please note that the following might precipitate seizures:   missed doses of antiseizure medications  being sick with a fever, stress  Fatigue  sleep deprivation or abnormal sleeping patterns  not eating regularly  not drinking enough water  drinking too much alcohol  stopping alcohol suddenly if you are currently using it on a regular/daily basis,   using recreational drugs, among others.    Please note that the following might lead to an injury or even be life-threatening in the event you have a seizure and/or lose awareness while:  being in a large body of water by yourself, such as bath, pool, lake, ocean, among others (risk of drowning)  being on unprotected heights (risk of fall)  being around and/or operating heavy machinery (risk of injury)  being around open fire/hot surfaces (risk of burns)  any other activities/circumstances, in which if you lose awareness, you might  injure yourself and/or others.  -------------------------------------------------------------------------------------------  SUDEP (SUDDEN UNEXPECTED DEATH IN EPILEPSY)  It is important that your seizures are well controlled and you have none or have them rarely. In addition to avoiding injury related to breakthrough seizures, frequent seizures increase risk of SUDEP (sudden unexpected death in epilepsy), where a person goes into a seizure and then never wakes up. The best way to prevent SUDEP is to control your seizures well.   ------------------------------------------------------------------------------------------  Please call for help (crisis line and/or 911) in case you have thoughts of harming yourself and/or others.  ------------------------------------------------------------------------------------------  INSTRUCTIONS FOR YOUR FAMILY/CAREGIVERS:  Please call 911 if the patient has a seizure longer than 2-3 minutes, if seizures are back to back without her recovering to her baseline, or she does not start recovering within 5-10 minutes after the seizure stops. During the seizure - please turn her on her side, please make sure her head is protected (for example, you should put a pillow under her head, if one is available), and please do not put anything in her mouth.   ------------------------------------------------------------------------------------------  PATIENT EXPECTATIONS,  IMPORTANT APPOINTMENT REMINDERS, AND ADDITIONAL HELPFUL TIPS:   REFILLS:   Request refills AT LEAST 1 week in advance to ensure you do not run out of medications    MyChart  It is STRONGLY encouraged that ALL patients sign up for MyChart. It is BY FAR the fastest and most convenient way for both Dr. Johansen and patients to obtain timely refills.  If you are having trouble signing up or logging into your account, staff are available to help you. Please ask a medical assistant or staff at the  to assist you.    TEST RESULS:    All labs and diagnostic test results will be reviewed at your next visit, UNLESS  Dr. Johansen determines that there are important findings on the tests need to be acted on sooner. Dr. Johansen will either call or send a message through [employer] if this is the case.    BE PREPARED PRIOR TO EVERY APPOINTMENT:  All patient are responsible for ensuring that ALL test results that were completed outside of the [facility] system have been received by our Neurology Department PRIOR to your appointment with Dr. Johansen.    IMPORTANT:  ALL images (not just the reports) must be sent and uploaded to the [facility] system. Dr. Johansen reviews all images personally prior to each visit. Ensuring that ALL the test results and test images are accessible to Dr. Johansen prior to your appointment is YOUR responsibility and an important part of making the most out of each appointment.   Bring a government-issues picture ID and an updated insurance card EVERY visit.  It is highly recommended that you bring at every visit a list of the most important topics that you want address. While it may not be possible to address all items on the list in a single visit, preparing a list will ensure that Dr. Johansen addresses the items that are most important to you and your health    PAPERWORK, DOCUMENTATION, LETTER REQUESTS:  You must notify the office ahead of your appointment of all paperwork or letter requests.   Please DO NOT wait until the last minute to make these requests. Please give all paperwork to the medical assistant at the start of the appointment and check-in process. Please note that Dr. Johansen may not be able complete some types of documentation in a single appointment or even within a single day or week. This is why it is important to communicate paperwork requests prior to your appointment and at least 2 weeks prior to any deadlines.    KNOW ALL YOU MEDICATIONS:   AT EVERY SINGLE APPOINTMENT, please bring a list of every single prescribed,  non-prescribed, and over the counter medication or supplements you are taking, including ones taken on a rare or intermittent basis.  Include the following information for each prescribed or non-prescribed medications:  Name of medication   The strength of EACH pill/capsule/tablet, etc.   The number of pills/capsules/tablets, etc taken per dose  The number and time of day that doses are taken  For every single Supplement that you take on a routine or intermittent basis, you must include:  The Brand Name   A complete list of every single ingredient, compound, vitamin, and/or mineral in each dose, along with the corresponding amounts/strengths of all ingredients, vitamins, minerals, etc., if such information is provided or known  The number of doses taken per day and time of day doses are taken  If medications are taken on an intermittent or as needed basis, please estimate how many days per week or days per month the medications are used  DO NOT just print out your medication list from [employer] or bring a list from a prior appointment or hospitalizations because the information is often often unreliable, inaccurate, outdated, and/or incomplete   The list should be printed or written  If you forget or do not have a list of all the medication, then it is acceptable, although less preferred, to bring all the bottles to the appointment     ARRIVE EARLY FOR ALL VISITS:  Please note that we are unable to accommodate late arrivals as per office policy.  YOU-the patient - (NOT a parent, spouse, or friend) must be physically present at check-in no later than 12 minutes after the scheduled appointment time, or you will be asked to reschedule   Consider scheduling a virtual appointment with Dr. Johansen through [employer] as an alternative if transportation to the clinic is difficult or unavailable   Please note, however, that virtual visits can only be scheduled after being an established patient of Dr. Johansen. All new appointments  "must be done in-person in clinic  Some insurances will not cover the cost of virtual appointments. Please check with your insurance to find out if these visits are covered    COMMUNICATING URGENT AND NON-URGENT MATTERS:  Your concerns are important and deserve to be heard and addressed. If you have an urgent matter, there are two methods that will ensure your concerns are prioritized appropriately:   Preferred method: Sign-up/Login to your [employer] account and send a message addressed to Dr. Johansen or Hawa Griffith (Dr. Johansen's assistant). In the subject line, type \"urgent\" followed by a word or phrase describing the situation (For example, write \"Urgent: Out of antiseuzre med and need refill\" or \"Urgent: Severe side effects to new meds\". In doing this, our staff can ensure urgent messages are triaged appropriately and communicated to Dr. Johansen that day.  Call [facility] main line at [phone]. Dr. Johansen's voicemail extension is [phone]. When leaving a voice message, specifically indicate if it is urgent (or non-urgent) so that the matter can be triaged appropriately and addressed in a timely manner    Thank you for entrusting your neurological care to [facility] and we look forward to continuing to serve you.   "

## 2024-03-06 PROCEDURE — RXMED WILLOW AMBULATORY MEDICATION CHARGE: Performed by: STUDENT IN AN ORGANIZED HEALTH CARE EDUCATION/TRAINING PROGRAM

## 2024-03-09 ENCOUNTER — PHARMACY VISIT (OUTPATIENT)
Dept: PHARMACY | Facility: MEDICAL CENTER | Age: 29
End: 2024-03-09
Payer: COMMERCIAL

## 2024-04-14 NOTE — ED NOTES
ERP at bedside for re-eval       All other components within normal limits   MAGNESIUM   TROPONIN   BRAIN NATRIURETIC PEPTIDE       All other labs were within normal range or not returned as of thisdictation.      IMAGING  CTA CHEST W WO CONTRAST PE Eval   Final Result       1.  No convincing evidence of pulmonary embolism.      2.  Slight thin scarring bands vs less likely subsegmental atelectatic changes.    No airspace opacities.      Vascular duplex lower extremity venous right         XR CHEST PORTABLE    (Results Pending)     No results found.   No results found.       ED Course as of 04/14/24 1918   Sun Apr 14, 2024   1542 The vascular technician reports that the duplex ultrasound of the right leg is negative for DVT [SH]   1915 Chest x-ray shows no acute cardiopulmonary process.  CT is negative for pulmonary embolism.  I have encouraged the patient to follow-up with his cardiologist regarding his several months of dyspnea.  Do not feel that admission is warranted at this time.  He can follow-up with primary care regarding his leg discomfort.  Symptoms localized to the hip flexor musculature.  Over-the-counter medicines, topical medications and massage and stretching.  Primary care can refer to physical therapy if things persist [SH]      ED Course User Index  [SH] Moises Wade MD       PROCEDURES   Unless otherwise noted below, none     Procedures    CRITICAL CARE TIME   N/A    CONSULTS:  None    EMERGENCY DEPARTMENT COURSE and DIFFERENTIAL DIAGNOSIS/MDM:   Vitals:    Vitals:    04/14/24 1350   BP: (!) 137/59   Pulse: 98   Resp: 18   Temp: 98.3 °F (36.8 °C)   TempSrc: Oral   SpO2: 97%   Weight: (!) 137.9 kg (304 lb)   Height: 1.88 m (6' 2\")       Patient was given the following medications:  Medications   sodium chloride 0.9 % bolus 500 mL (500 mLs IntraVENous New Bag 4/14/24 1645)   iopamidol (ISOVUE-370) 76 % injection 80 mL (80 mLs IntraVENous Given 4/14/24 1636)            The patient tolerated their visit well.   Thepatient and  ,DirectAddress_Unknown,DirectAddress_Unknown

## 2024-05-02 DIAGNOSIS — F41.9 ANXIETY: ICD-10-CM

## 2024-05-02 DIAGNOSIS — Z87.898 HISTORY OF SYNCOPE: ICD-10-CM

## 2024-05-02 DIAGNOSIS — G44.89 OTHER HEADACHE SYNDROME: ICD-10-CM

## 2024-05-02 DIAGNOSIS — M54.2 CERVICALGIA: ICD-10-CM

## 2024-05-02 DIAGNOSIS — R56.9 CONVULSIONS, UNSPECIFIED CONVULSION TYPE (HCC): ICD-10-CM

## 2024-05-02 DIAGNOSIS — R52 PAIN: ICD-10-CM

## 2024-05-02 DIAGNOSIS — Z65.8 PSYCHOSOCIAL STRESSORS: ICD-10-CM

## 2024-05-02 DIAGNOSIS — Z72.0 CURRENT OCCASIONAL SMOKER: ICD-10-CM

## 2024-05-02 DIAGNOSIS — F32.A DEPRESSION, UNSPECIFIED DEPRESSION TYPE: ICD-10-CM

## 2024-05-02 DIAGNOSIS — F51.01 PRIMARY INSOMNIA: ICD-10-CM

## 2024-05-02 DIAGNOSIS — Z29.9 PROPHYLACTIC MEASURE: ICD-10-CM

## 2024-05-02 DIAGNOSIS — R11.2 NAUSEA AND VOMITING, UNSPECIFIED VOMITING TYPE: ICD-10-CM

## 2024-05-02 DIAGNOSIS — R56.9 SEIZURE (HCC): ICD-10-CM

## 2024-05-02 DIAGNOSIS — G47.33 OBSTRUCTIVE SLEEP APNEA SYNDROME: ICD-10-CM

## 2024-05-02 RX ORDER — IBUPROFEN 600 MG/1
600 TABLET ORAL EVERY 6 HOURS PRN
Status: CANCELLED | OUTPATIENT
Start: 2024-05-02

## 2024-05-02 RX ORDER — NICOTINE 21 MG/24HR
21 PATCH, TRANSDERMAL 24 HOURS TRANSDERMAL
Status: CANCELLED | OUTPATIENT
Start: 2024-05-03

## 2024-05-02 RX ORDER — LORAZEPAM 2 MG/ML
1 INJECTION INTRAMUSCULAR
Status: CANCELLED | OUTPATIENT
Start: 2024-05-02

## 2024-05-02 RX ORDER — MULTIVITAMIN WITH IRON
250 TABLET ORAL DAILY
COMMUNITY

## 2024-05-02 RX ORDER — POLYETHYLENE GLYCOL 3350 17 G/17G
1 POWDER, FOR SOLUTION ORAL
Status: CANCELLED | OUTPATIENT
Start: 2024-05-02

## 2024-05-02 RX ORDER — ONDANSETRON 2 MG/ML
4 INJECTION INTRAMUSCULAR; INTRAVENOUS EVERY 4 HOURS PRN
Status: CANCELLED | OUTPATIENT
Start: 2024-05-02

## 2024-05-02 RX ORDER — VITAMIN B COMPLEX
2000 TABLET ORAL DAILY
COMMUNITY

## 2024-05-02 RX ORDER — LORAZEPAM 2 MG/ML
2 INJECTION INTRAMUSCULAR
Status: CANCELLED | OUTPATIENT
Start: 2024-05-02

## 2024-05-02 RX ORDER — ENOXAPARIN SODIUM 100 MG/ML
40 INJECTION SUBCUTANEOUS DAILY
Status: CANCELLED | OUTPATIENT
Start: 2024-05-02

## 2024-05-02 RX ORDER — ONDANSETRON 4 MG/1
4 TABLET, ORALLY DISINTEGRATING ORAL EVERY 4 HOURS PRN
Status: CANCELLED | OUTPATIENT
Start: 2024-05-02

## 2024-05-02 RX ORDER — AMOXICILLIN 250 MG
2 CAPSULE ORAL 2 TIMES DAILY
Status: CANCELLED | OUTPATIENT
Start: 2024-05-03

## 2024-05-02 RX ORDER — ZONISAMIDE 100 MG/1
200 CAPSULE ORAL EVERY EVENING
Status: ON HOLD | COMMUNITY
End: 2024-05-08 | Stop reason: DRUGHIGH

## 2024-05-02 NOTE — PROGRESS NOTES
Pre-admission med rec completed per patient ([phone]).  Allergies reviewed with patient.    Outpatient antibiotics within the last 30 days: none.    ANTICOAGULATION: none.    Patient's preferred pharmacy: [facility] on [address]

## 2024-05-06 ENCOUNTER — HOSPITAL ENCOUNTER (INPATIENT)
Facility: MEDICAL CENTER | Age: 29
LOS: 3 days | DRG: 101 | End: 2024-05-09
Attending: STUDENT IN AN ORGANIZED HEALTH CARE EDUCATION/TRAINING PROGRAM | Admitting: STUDENT IN AN ORGANIZED HEALTH CARE EDUCATION/TRAINING PROGRAM
Payer: COMMERCIAL

## 2024-05-06 DIAGNOSIS — Z72.0 CURRENT OCCASIONAL SMOKER: ICD-10-CM

## 2024-05-06 DIAGNOSIS — F32.A DEPRESSION, UNSPECIFIED DEPRESSION TYPE: Chronic | ICD-10-CM

## 2024-05-06 DIAGNOSIS — F51.01 PRIMARY INSOMNIA: Chronic | ICD-10-CM

## 2024-05-06 DIAGNOSIS — R56.9 SEIZURE (HCC): ICD-10-CM

## 2024-05-06 DIAGNOSIS — R11.2 NAUSEA AND VOMITING, UNSPECIFIED VOMITING TYPE: ICD-10-CM

## 2024-05-06 DIAGNOSIS — Z87.898 HISTORY OF SYNCOPE: ICD-10-CM

## 2024-05-06 DIAGNOSIS — Z65.8 PSYCHOSOCIAL STRESSORS: ICD-10-CM

## 2024-05-06 DIAGNOSIS — R56.9 CONVULSIONS, UNSPECIFIED CONVULSION TYPE (HCC): ICD-10-CM

## 2024-05-06 DIAGNOSIS — Z29.9 PROPHYLACTIC MEASURE: ICD-10-CM

## 2024-05-06 DIAGNOSIS — R56.9 SEIZURES (HCC): ICD-10-CM

## 2024-05-06 DIAGNOSIS — R52 PAIN: ICD-10-CM

## 2024-05-06 DIAGNOSIS — F41.9 ANXIETY: ICD-10-CM

## 2024-05-06 DIAGNOSIS — G47.33 OBSTRUCTIVE SLEEP APNEA SYNDROME: Chronic | ICD-10-CM

## 2024-05-06 LAB
ALBUMIN SERPL BCP-MCNC: 4 G/DL (ref 3.2–4.9)
ALBUMIN/GLOB SERPL: 1.3 G/DL
ALP SERPL-CCNC: 84 U/L (ref 30–99)
ALT SERPL-CCNC: 14 U/L (ref 2–50)
AMMONIA PLAS-SCNC: 17 UMOL/L (ref 11–45)
AMPHET UR QL SCN: NEGATIVE
ANION GAP SERPL CALC-SCNC: 12 MMOL/L (ref 7–16)
APPEARANCE UR: CLEAR
AST SERPL-CCNC: 12 U/L (ref 12–45)
BACTERIA #/AREA URNS HPF: NEGATIVE /HPF
BARBITURATES UR QL SCN: NEGATIVE
BASOPHILS # BLD AUTO: 0.4 % (ref 0–1.8)
BASOPHILS # BLD: 0.04 K/UL (ref 0–0.12)
BENZODIAZ UR QL SCN: NEGATIVE
BILIRUB SERPL-MCNC: 0.2 MG/DL (ref 0.1–1.5)
BILIRUB UR QL STRIP.AUTO: NEGATIVE
BUN SERPL-MCNC: 15 MG/DL (ref 8–22)
BZE UR QL SCN: NEGATIVE
CALCIUM ALBUM COR SERPL-MCNC: 9.1 MG/DL (ref 8.5–10.5)
CALCIUM SERPL-MCNC: 9.1 MG/DL (ref 8.5–10.5)
CANNABINOIDS UR QL SCN: NEGATIVE
CHLORIDE SERPL-SCNC: 105 MMOL/L (ref 96–112)
CO2 SERPL-SCNC: 19 MMOL/L (ref 20–33)
COHGB MFR BLD: 8.3 % (ref 0–4.9)
COLOR UR: YELLOW
CREAT SERPL-MCNC: 0.7 MG/DL (ref 0.5–1.4)
EOSINOPHIL # BLD AUTO: 0.07 K/UL (ref 0–0.51)
EOSINOPHIL NFR BLD: 0.7 % (ref 0–6.9)
EPI CELLS #/AREA URNS HPF: NEGATIVE /HPF
ERYTHROCYTE [DISTWIDTH] IN BLOOD BY AUTOMATED COUNT: 46.2 FL (ref 35.9–50)
FENTANYL UR QL: NEGATIVE
GFR SERPLBLD CREATININE-BSD FMLA CKD-EPI: 121 ML/MIN/1.73 M 2
GLOBULIN SER CALC-MCNC: 3 G/DL (ref 1.9–3.5)
GLUCOSE SERPL-MCNC: 81 MG/DL (ref 65–99)
GLUCOSE UR STRIP.AUTO-MCNC: NEGATIVE MG/DL
HCG SERPL QL: NEGATIVE
HCT VFR BLD AUTO: 45.1 % (ref 37–47)
HGB BLD-MCNC: 15.5 G/DL (ref 12–16)
HYALINE CASTS #/AREA URNS LPF: ABNORMAL /LPF
IMM GRANULOCYTES # BLD AUTO: 0.05 K/UL (ref 0–0.11)
IMM GRANULOCYTES NFR BLD AUTO: 0.5 % (ref 0–0.9)
KETONES UR STRIP.AUTO-MCNC: NEGATIVE MG/DL
LEUKOCYTE ESTERASE UR QL STRIP.AUTO: ABNORMAL
LYMPHOCYTES # BLD AUTO: 3.07 K/UL (ref 1–4.8)
LYMPHOCYTES NFR BLD: 28.7 % (ref 22–41)
MCH RBC QN AUTO: 32.5 PG (ref 27–33)
MCHC RBC AUTO-ENTMCNC: 34.4 G/DL (ref 32.2–35.5)
MCV RBC AUTO: 94.5 FL (ref 81.4–97.8)
METHADONE UR QL SCN: NEGATIVE
MICRO URNS: ABNORMAL
MONOCYTES # BLD AUTO: 0.61 K/UL (ref 0–0.85)
MONOCYTES NFR BLD AUTO: 5.7 % (ref 0–13.4)
NEUTROPHILS # BLD AUTO: 6.86 K/UL (ref 1.82–7.42)
NEUTROPHILS NFR BLD: 64 % (ref 44–72)
NITRITE UR QL STRIP.AUTO: NEGATIVE
NRBC # BLD AUTO: 0 K/UL
NRBC BLD-RTO: 0 /100 WBC (ref 0–0.2)
OPIATES UR QL SCN: NEGATIVE
OXYCODONE UR QL SCN: NEGATIVE
PCP UR QL SCN: NEGATIVE
PH UR STRIP.AUTO: 5.5 [PH] (ref 5–8)
PLATELET # BLD AUTO: 266 K/UL (ref 164–446)
PMV BLD AUTO: 9.3 FL (ref 9–12.9)
POTASSIUM SERPL-SCNC: 3.8 MMOL/L (ref 3.6–5.5)
PROPOXYPH UR QL SCN: NEGATIVE
PROT SERPL-MCNC: 7 G/DL (ref 6–8.2)
PROT UR QL STRIP: NEGATIVE MG/DL
RBC # BLD AUTO: 4.77 M/UL (ref 4.2–5.4)
RBC # URNS HPF: ABNORMAL /HPF
RBC UR QL AUTO: NEGATIVE
SODIUM SERPL-SCNC: 136 MMOL/L (ref 135–145)
SP GR UR STRIP.AUTO: 1.01
UROBILINOGEN UR STRIP.AUTO-MCNC: 0.2 MG/DL
WBC # BLD AUTO: 10.7 K/UL (ref 4.8–10.8)
WBC #/AREA URNS HPF: ABNORMAL /HPF

## 2024-05-06 PROCEDURE — 99223 1ST HOSP IP/OBS HIGH 75: CPT | Performed by: STUDENT IN AN ORGANIZED HEALTH CARE EDUCATION/TRAINING PROGRAM

## 2024-05-06 PROCEDURE — 95720 EEG PHY/QHP EA INCR W/VEEG: CPT | Performed by: STUDENT IN AN ORGANIZED HEALTH CARE EDUCATION/TRAINING PROGRAM

## 2024-05-06 PROCEDURE — 4A10X4Z MONITORING OF CENTRAL NERVOUS ELECTRICAL ACTIVITY, EXTERNAL APPROACH: ICD-10-PCS | Performed by: STUDENT IN AN ORGANIZED HEALTH CARE EDUCATION/TRAINING PROGRAM

## 2024-05-06 RX ORDER — MIRTAZAPINE 15 MG/1
30 TABLET, FILM COATED ORAL EVERY EVENING
Status: DISCONTINUED | OUTPATIENT
Start: 2024-05-06 | End: 2024-05-09 | Stop reason: HOSPADM

## 2024-05-06 RX ORDER — IBUPROFEN 600 MG/1
600 TABLET ORAL EVERY 6 HOURS PRN
Status: DISCONTINUED | OUTPATIENT
Start: 2024-05-06 | End: 2024-05-09 | Stop reason: HOSPADM

## 2024-05-06 RX ORDER — ENOXAPARIN SODIUM 100 MG/ML
40 INJECTION SUBCUTANEOUS DAILY
Status: DISCONTINUED | OUTPATIENT
Start: 2024-05-06 | End: 2024-05-09 | Stop reason: HOSPADM

## 2024-05-06 RX ORDER — LORAZEPAM 2 MG/ML
1 INJECTION INTRAMUSCULAR
Status: DISCONTINUED | OUTPATIENT
Start: 2024-05-06 | End: 2024-05-09 | Stop reason: HOSPADM

## 2024-05-06 RX ORDER — ONDANSETRON 2 MG/ML
4 INJECTION INTRAMUSCULAR; INTRAVENOUS EVERY 4 HOURS PRN
Status: DISCONTINUED | OUTPATIENT
Start: 2024-05-06 | End: 2024-05-09 | Stop reason: HOSPADM

## 2024-05-06 RX ORDER — ONDANSETRON 4 MG/1
4 TABLET, ORALLY DISINTEGRATING ORAL EVERY 4 HOURS PRN
Status: DISCONTINUED | OUTPATIENT
Start: 2024-05-06 | End: 2024-05-09 | Stop reason: HOSPADM

## 2024-05-06 RX ORDER — NICOTINE 21 MG/24HR
21 PATCH, TRANSDERMAL 24 HOURS TRANSDERMAL
Status: DISCONTINUED | OUTPATIENT
Start: 2024-05-06 | End: 2024-05-09 | Stop reason: HOSPADM

## 2024-05-06 RX ORDER — AMOXICILLIN 250 MG
2 CAPSULE ORAL 2 TIMES DAILY
Status: DISCONTINUED | OUTPATIENT
Start: 2024-05-06 | End: 2024-05-09 | Stop reason: HOSPADM

## 2024-05-06 RX ORDER — POLYETHYLENE GLYCOL 3350 17 G/17G
1 POWDER, FOR SOLUTION ORAL
Status: DISCONTINUED | OUTPATIENT
Start: 2024-05-06 | End: 2024-05-09 | Stop reason: HOSPADM

## 2024-05-06 RX ORDER — LORAZEPAM 2 MG/ML
2 INJECTION INTRAMUSCULAR
Status: DISCONTINUED | OUTPATIENT
Start: 2024-05-06 | End: 2024-05-09 | Stop reason: HOSPADM

## 2024-05-06 RX ADMIN — ENOXAPARIN SODIUM 40 MG: 100 INJECTION SUBCUTANEOUS at 18:01

## 2024-05-06 RX ADMIN — MIRTAZAPINE 30 MG: 15 TABLET, FILM COATED ORAL at 21:37

## 2024-05-06 RX ADMIN — SENNOSIDES AND DOCUSATE SODIUM 2 TABLET: 50; 8.6 TABLET ORAL at 18:01

## 2024-05-06 ASSESSMENT — LIFESTYLE VARIABLES
CONSUMPTION TOTAL: NEGATIVE
EVER FELT BAD OR GUILTY ABOUT YOUR DRINKING: NO
ALCOHOL_USE: NO
ON A TYPICAL DAY WHEN YOU DRINK ALCOHOL HOW MANY DRINKS DO YOU HAVE: 0
HOW MANY TIMES IN THE PAST YEAR HAVE YOU HAD 5 OR MORE DRINKS IN A DAY: 0
TOTAL SCORE: 0
HAVE YOU EVER FELT YOU SHOULD CUT DOWN ON YOUR DRINKING: NO
HAVE PEOPLE ANNOYED YOU BY CRITICIZING YOUR DRINKING: NO
TOTAL SCORE: 0
EVER HAD A DRINK FIRST THING IN THE MORNING TO STEADY YOUR NERVES TO GET RID OF A HANGOVER: NO
TOTAL SCORE: 0
DOES PATIENT WANT TO STOP DRINKING: CANNOT ASSESS
AVERAGE NUMBER OF DAYS PER WEEK YOU HAVE A DRINK CONTAINING ALCOHOL: 0

## 2024-05-06 ASSESSMENT — COGNITIVE AND FUNCTIONAL STATUS - GENERAL
SUGGESTED CMS G CODE MODIFIER DAILY ACTIVITY: CH
DAILY ACTIVITIY SCORE: 24
MOBILITY SCORE: 24
SUGGESTED CMS G CODE MODIFIER MOBILITY: CH

## 2024-05-06 ASSESSMENT — PATIENT HEALTH QUESTIONNAIRE - PHQ9
2. FEELING DOWN, DEPRESSED, IRRITABLE, OR HOPELESS: NOT AT ALL
SUM OF ALL RESPONSES TO PHQ9 QUESTIONS 1 AND 2: 0
1. LITTLE INTEREST OR PLEASURE IN DOING THINGS: NOT AT ALL

## 2024-05-06 ASSESSMENT — FIBROSIS 4 INDEX: FIB4 SCORE: 0.33

## 2024-05-06 ASSESSMENT — PAIN DESCRIPTION - PAIN TYPE: TYPE: ACUTE PAIN

## 2024-05-06 NOTE — CARE PLAN
The patient is Stable - Low risk of patient condition declining or worsening    Shift Goals  Clinical Goals: EEG monitoring  Patient Goals: EEG monitoring    Progress made toward(s) clinical / shift goals:    Problem: Knowledge Deficit - Standard  Goal: Patient and family/care givers will demonstrate understanding of plan of care, disease process/condition, diagnostic tests and medications  Outcome: Progressing  Note: Pt verbalizes understanding of plan of care and asks appropriate questions.      Problem: Seizure Precautions  Goal: Implementation of seizure precautions  Outcome: Progressing  Note: All seizure precautions in place.

## 2024-05-06 NOTE — LETTER
May 9, 2024    To Whom It May Concern:    This is confirmation that my patient, Orin Rodas, was admitted to the hospital from 5/6/2024  to 05/09/24 for management of a chronic medical problem. Please excuse her absence.    If you have any questions please do not hesitate to call me at the phone number listed below.    Sincerely,        Pablo Johansen MD  Department of Neurology at [facility]  Diplomate of the American Board of Psychiatry and Neurology, General Neurology  Diplomate of American Board of Psychiatry and Neurology, a Member Board of the American Board of Medical Subspecialties, Epilepsy  Director of [facility]'s Level III Comprehensive Epilepsy Program  Professor of Clinical Neurology, [employer].   Number: [phone]  Fax: [phone]  E-mail: [email or web address]

## 2024-05-06 NOTE — H&P
NEUROLOGY EMU H&P 5/6/2024     REFERRING PROVIDER: Dr. Johansen    REASON FOR ADMISSION: Reason for EMU Admission: Characterization of paroxysmal events and Medication titration/optimization     HISTORY OF PRESENT ILLNESS: Orin Rodas is a 28 y.o. female who presents to the EMU for evaluation.    RELEVANT PMHx:   Patient is being admitted to EMU to characterize new onset convulsions and altered mental status that need to be characterized on EEG. She has ongoing disabling spells that are impacting her job, causing physical injury, and degrading her quality of life. These clinical events need to be captured so that a diagnosis can be definiibely made and appropraite treatments be given. I am suspicious for nonepileptic attacks vs mixed epileptic  and nonepileptic  attacks.      I asked that the patient stop taking her one antiseiszure medication (zonisimade 100 mg once per night) the Friday before (5/3/24) she is to be admitted to the EMU on the Monday (5/6/24). She has therefore arrived on Monday off of all antiseizure medication for > 48 hours.     Will plan to include bilateral inferior temporal leads.     ------  Per my last night in March 2024, she had a convulsions at work in January 2024.  She fell, struck her head and was apparently post-ictal for 24 hours or more. She does not remember much about that day. No obvious prodrome or aura. This occurred in mid/early January (?) . No other major events since that one. She does have intermittent smaller events, sometimes associated with sudden onset anxiety/fear/panic->blurring of vision->unable to speak.    In her December 2023 appointment, she reported having a convulsions that seemed different, new starting in Sept of 2023 after a year with no seizures. Significant other showed me a video of her convulsions. It showed her body twitching, /shaking off and on and arrhythmically, grunting, eyes  were closed, lasted > 5 minutes. It appeared nonepileptic.  Patient does report significant stress in her life at the time as her sister just  and having a lot of work-related stress.     She has been working hard to maintain a healthier lifestyle. She has been eating healthier, has lost weight, has been practicing better sleep habits.     She has a diagnosis of EDGARD and this still is not being treated.    She has a history of headaches/migraines. In early  She was worked up for secondary headaches. LP with normal opening pressure. MRV head was normal as well    -------  Seizure history:    Seizures: 2015 started out with GTCs. Aura:. Unable to talk, becomes mute then GTC. Note this aura is the same symptoms as her current complaint above.. Since 2019 no GTCs. Between  and  had seizures 3 tmes per month. Once put on zonismaide they stopped.     Prior AEDs:  Keppra -   Lamictal  Onfi    PRIOR WORK-UP TO DATE:  EEG 24 hour ambulatory - 2016: WNL    5 day EMU stay in 2017: EEG was WNL and no events were captured    MRI Brain w/wo Oct 2016  1. Agenesis of the septum pellucidum.  2. No evidence of mass lesion, heterotopic gray matter, gross cortical dysplasia or mesial temporal sclerosis.  3. There are rare scattered punctate areas of of increased T2 signal intensity in the periventricular and subcortical white matter consistent with areas of chronic microvascular ischemic change, demyelination, or gliosis.    COMPLETE HOME MED LIST:     Current Facility-Administered Medications:     enoxaparin (LOVENOX) injection    ibuprofen    LORazepam **OR** LORazepam    ondansetron **OR** ondansetron    senna-docusate **AND** polyethylene glycol/lytes    nicotine **AND** Nicotine Replacement Patient Education Materials **AND** nicotine polacrilex    mirtazapine     MEDICATION ALLERGIES:  Allergies   Allergen Reactions    Pomegranate (Punica Granatum) Hives    Soy Allergy Hives       REVIEW OF SYSTEMS:   ROS negative except that which is mentioned above    PAST MEDICAL  HISTORY:   Past Medical History:   Diagnosis Date    Depression     Epilepsy (HCC)     Seizure (HCC)     since October     PAST SURGICAL HISTORY:   No past surgical history on file.  FAMILY HISTORY:   Family History   Problem Relation Age of Onset    Diabetes Maternal Grandmother      SOCIAL HISTORY:   Social History     Socioeconomic History    Marital status: Single     Spouse name: Not on file    Number of children: Not on file    Years of education: Not on file    Highest education level: Not on file   Occupational History    Not on file   Tobacco Use    Smoking status: Some Days     Current packs/day: 0.00     Types: Cigarettes     Last attempt to quit: 2021     Years since quittin.4    Smokeless tobacco: Never    Tobacco comments:     Hookah    Vaping Use    Vaping Use: Former    Substances: Flavoring   Substance and Sexual Activity    Alcohol use: No     Alcohol/week: 0.0 oz    Drug use: No    Sexual activity: Yes   Other Topics Concern    Not on file   Social History Narrative    ** Merged History Encounter **         [employer] -senior    Lives with Mom, sisters, brother             Social Determinants of Health     Financial Resource Strain: Not on file   Food Insecurity: Not on file   Transportation Needs: Not on file   Physical Activity: Not on file   Stress: Not on file   Social Connections: Not on file   Intimate Partner Violence: Not on file   Housing Stability: Not on file       PHYSICAL EXAM:   /72   Pulse 73   Temp 36.3 °C (97.3 °F) (Temporal)   Resp 16   Wt 72.6 kg (160 lb)   SpO2 97%     Physical Exam  Constitutional:       General: She is not in acute distress.  HENT:      Head: Normocephalic.   Eyes:      Extraocular Movements: EOM normal. No nystagmus.   Neurological:      Mental Status: She is alert.      Motor: Motor strength is normal.  Psychiatric:         Speech: Speech normal.        NEUROLOGICAL EXAM:   Neurological Exam  Mental Status  Alert. Speech is normal. Language  is fluent with no aphasia. Attention and concentration are normal.    Cranial Nerves  CN III, IV, VI: Extraocular movements intact bilaterally. No nystagmus. Normal saccades. Normal smooth pursuit.  CN V: Facial sensation is normal.  CN VII: Full and symmetric facial movement.  CN IX, X: Palate elevates symmetrically  CN XI: Shoulder shrug strength is normal.  CN XII: Tongue midline without atrophy or fasciculations.    Motor  Normal muscle bulk throughout. No fasciculations present. Normal muscle tone. No abnormal involuntary movements. Strength is 5/5 throughout all four extremities.    Sensory  Light touch is normal in upper and lower extremities.     Coordination  Right: Finger-to-nose normal.Left: Finger-to-nose normal.    Gait    Deferred.       Assessment & Plan  , AND EDUCATION/COUNSELING  This is a 28 y.o. female who presents to the EMU for reasons detailed elsewhere. I had an extensive discussion with the patient about the purpose of the admission. Discussed the purpose of provocative maneuvers such as photic stimulation, hyperventilation, and/or sleep deprivation which may unmask epileptic activity. We discussed that it may be necessary to decrease, or discontinue altogether, any and all anti-seizure medications to achieve the goals of the admission. I explained that this places patient at higher risk for seizures and status epilepticus, a serious life-threatening emergency with the potential for serious injury or death. It is therefore critical to the patient's safety that he/she monitored in the epilepsy monitoring unit for multiple days to mitigate the risk for serious injury or death. Discussed that the EMU and its personnel mitigate risk as there are rescue medications on hand if needed for any prolonged or repetitive seizures; trained nursing staff, EEG technicians, and a board certified epileptologist available 24/7; and continuous EEG and video surveillance being monitoring live by trained  personnel at all times.      Discussed the importance of maintaining seizure precautions at all times. Discussed the importance of notifying nursing staff by using the call button for any concerns or needs, especially to assist with ambulation or transtions into and out of bed. Emphasized that the patient is at a higher for falling and potential subsequent injury due to the cumbersome equipment being worn, as well as other potential factors that may impair balance. It is therefore critical that the patient refrain from getting out of bed or walking without staff assistance.     Discussed the importance and rationale for DVT prophylaxis as well.     Finally, counseled the patient on using the push-button should she/he have any events concerning for seizure. Encourage visitors to press the button as well if a seizure or aura is witnessed and the patient is unable to press the button herself/himself.     Patient agreed to the above discussion. All questions/concerns were addressed.    PLAN:  Patient is being admitted to EMU to characterize new onset convulsions and altered mental status that need to be characterized on EEG. These are now impacting her job, causing physical injury, and degrading her quality of life. The convulsions are new since September 2023 and are different than her prior convulsions.  These clinical events need to be captured so that a diagnosis can be definiibely made and appropriate  treatments be given. I am suspicious for nonepileptic attacks vs mixed epileptic  and nonepileptic  attacks.      I asked that the patient stop taking her one antiseiszure medication (zonisimade 100 mg once per night) the Friday before (5/3/24) she is to be admitted to the EMU on the Monday (5/6/24). She did this as instructed. She arrives today very sleep deprived as well having slept very little if any over the weekend. She has not been taking home mirtazapine either which is likely contributing to her poor sleep  this past weekend. We will continue her home mirtazepine. She has therefore arrived on Monday off of all antiseizure medication for > 48 hours. Because patient is off all antiseizure medications, this places her at high risk for seizures, which may cause injury and be serious or life-threatening, warranting inpatient admission to the EMU for close EEG monitoring.         EVENTS IN QUESTIONS TO CAPTURE  Spells of sudden onset fear/panic->vision gets blurry->unable to speak  Number 1 above followed by loss of awareness->Convulsions.   more recent convulsion do not appear to have an aura and are felt to be different to the patient   Spells with of loss of awareness with no aura  Continuous VEEG monitoring  Vitals and neurological checks as ordered  Telemetry  Admission labs:   Other diagnostics if applicable: NA  HV and PS to be performed on Day 1 of admission and at the discretion of the attending epileptologist on subsequent days  Rescue Ativan Protocol:    Sleep deprivation: No  ANTISEIZURE REGIMEN  DAY 1:  None . Patient has been off of antiseizure medication since 5/3/2024      Insomnia  -continue mirtazapine nightly    Depression, anxiety, psychosocial stressors  -continue home mirtazepine    EDGARD untreated  -will address this in the outpatient setting    Elevated carboxyhemoglobin  -Will plan to repeat lab in 24-48 hours  -patient does report feeling always cold and needing to be close to a heater    OTHER ITEMS:  DVT Ppx: Lovenox and/or SCDs  DIET: Regular  Bowel regimen  PRN analgesics available for pain  PRN antiemetics available    CODE STATUS:   FULL CODE    BILLING DOCUMENTATION:     I spent a total of 80 minutes of face-to-face time in this visit. Over 50% of the time of the visit today was spent on counseling and/or coordination of care wtih the patient and/or family, as above in assessment in plan.    Pablo Johansen MD  Department of Neurology at [facility]  General Neurologist  and Epileptologist  Director of [facility]'s Level III Comprehensive Epilepsy Program  Professor of Clinical Neurology, [facility].   Phone: [phone]  Fax: [phone]  E-mail: [email or web address]

## 2024-05-06 NOTE — PROCEDURES
VIDEO ELECTROENCEPHALOGRAM REPORT - EPILEPSY MONITORING UNIT (EMU)     REFERRING PROVIDER: Dr. Johansen  DOS: 5/6/2024   ROOM: 81/00   TOTAL RECORDING TIME: 21 hours and 38 minutes of total recording time    INDICATION:  Orin Rodas 28 y.o. female presenting with seizure(s)    RELEVANT MEDICATIONS/TREATMENTS:   No AEDs     TECHNIQUE:   CVEEG was set up by a Neurodiagnostic technologist who performed education to the patient and staff. A minimum of 23 electrodes and 23 channel recording was setup and performed by Neurodiagnostic technologist, in accordance with the international 10-20 system. Impedence, electrode integrity, and technical impressions were documented a minimum of every 2-24 hour period by a Neurodiagnostic Technologist and reviewed by Interpreting Physician. The study was reviewed in bipolar and referential montages. The recording examined the patient in the awake, drowsy, and sleep state(s).     DESCRIPTION OF THE RECORD:  During wakefulness, the background was continuous and showed a 9-10 Hz posterior dominant rhythm.  There was reactivity to eye closure/opening.  A normal anterior-posterior gradient was noted with faster beta frequencies seen anteriorly.  During drowsiness, theta/delta frequencies were seen.    Sleep was captured and was characterized by diffuse background delta/theta activity with a loss of myogenic artifact.  N2 sleep transients in the form of sleep spindles and vertex waves were seen in the leads over the central regions.     ICTAL AND INTERICTAL FINDINGS:   No focal or generalized epileptiform activity noted.     No regional slowing or persistent focal asymmetries were seen.    No seizures    ACTIVATION PROCEDURES:   Hyperventilation was performed by the patient for a total of 3 minutes. The technician performing the test noted good effort. This technique did not elicited any abnormalities on EEG.  and Intermittent Photic stimulation was performed in a stepwise fashion  from 1 to 30 Hz and elicited a normal response (photic driving), most noticeable in the posterior leads.    EKG: Sampling of the EKG recording showed sinus rhythm    EVENTS:    No clinical events were captured or reported.    INTERPRETATION:  Normal video EEG recording in the awake, drowsy, and sleep state(s):  -No seizures.   -No persistent focal or regional slowing and no background asymmetries were seen.   -No definitive epileptiform discharges were seen.  -No events  -Note: A normal EEG does not rule out a diagnosis of epilepsy, as epilepsy is a clinical diagnosis      Pablo Johansen MD  Department of Neurology at [facility]  General Neurologist and Epileptologist  Director of [facility]'s Level III Comprehensive Epilepsy Program  Professor of Clinical Neurology, [facility].   Phone: [phone]  Fax: [phone]  E-mail: [email or web address]

## 2024-05-07 PROCEDURE — 95720 EEG PHY/QHP EA INCR W/VEEG: CPT | Performed by: STUDENT IN AN ORGANIZED HEALTH CARE EDUCATION/TRAINING PROGRAM

## 2024-05-07 PROCEDURE — 4A10X4Z MONITORING OF CENTRAL NERVOUS ELECTRICAL ACTIVITY, EXTERNAL APPROACH: ICD-10-PCS | Performed by: STUDENT IN AN ORGANIZED HEALTH CARE EDUCATION/TRAINING PROGRAM

## 2024-05-07 PROCEDURE — 99232 SBSQ HOSP IP/OBS MODERATE 35: CPT | Performed by: STUDENT IN AN ORGANIZED HEALTH CARE EDUCATION/TRAINING PROGRAM

## 2024-05-07 RX ADMIN — SENNOSIDES AND DOCUSATE SODIUM 2 TABLET: 50; 8.6 TABLET ORAL at 04:53

## 2024-05-07 RX ADMIN — ENOXAPARIN SODIUM 40 MG: 100 INJECTION SUBCUTANEOUS at 16:50

## 2024-05-07 RX ADMIN — MIRTAZAPINE 30 MG: 15 TABLET, FILM COATED ORAL at 16:52

## 2024-05-07 ASSESSMENT — PAIN DESCRIPTION - PAIN TYPE: TYPE: ACUTE PAIN

## 2024-05-07 NOTE — PROGRESS NOTES
Monitor Summary: SR 65-88, IL 0.14, QRS 0.08, QT 0.34, with rare PVCs per strip from monitor room.

## 2024-05-07 NOTE — PROGRESS NOTES
NEUROLOGY EMU PROGRESS NOTE 5/7/2024     REFERRING PROVIDER: Dr. Johansen    ID:   Patient is being admitted to EMU to characterize new onset convulsions and altered mental status that need to be characterized on EEG. She has ongoing disabling spells that are impacting her job, causing physical injury, and degrading her quality of life. These clinical events need to be captured so that a diagnosis can be definiibely made and appropraite treatments be given. Differential diagnosis includes nonepileptic attacks vs mixed epileptic  and nonepileptic  attacks.     INTERVAL HISTORY:  No events. Patient's EEG is thus far normal. However, clinical events in question have not been captured. Patient was sleep deprived all weekend coming into the EMU so sleep deprivation protocol was not done overnight. She has been off of zonisamide (and therefore all antiseizure medications) since Friday 5/3/24.    Labs ordered and reviewed. Labs notable for elevated carboxy hemoglobin             INPATIENT MED LIST:   enoxaparin (LOVENOX) injection, 40 mg, DAILY AT 1800  senna-docusate, 2 Tablet, BID  nicotine, 21 mg, Daily-0600  mirtazapine, 30 mg, Q EVENING        PHYSICAL EXAM:   /66   Pulse 75   Temp 36.4 °C (97.5 °F) (Temporal)   Resp 16   Wt 72.6 kg (160 lb)   SpO2 99%     Physical Exam  Constitutional:       General: She is not in acute distress.  HENT:      Head: Normocephalic.   Eyes:      Extraocular Movements: EOM normal. No nystagmus.   Neurological:      Mental Status: She is alert.      Motor: Motor strength is normal.  Psychiatric:         Speech: Speech normal.        NEUROLOGICAL EXAM:   Neurological Exam  Mental Status  Alert. Speech is normal. Language is fluent with no aphasia. Attention and concentration are normal.    Cranial Nerves  CN III, IV, VI: Extraocular movements intact bilaterally. No nystagmus. Normal saccades. Normal smooth pursuit.  CN V: Facial sensation is normal.  CN VII: Full and symmetric  facial movement.  CN IX, X: Palate elevates symmetrically  CN XI: Shoulder shrug strength is normal.  CN XII: Tongue midline without atrophy or fasciculations.    Motor  Normal muscle bulk throughout. No fasciculations present. Normal muscle tone. No abnormal involuntary movements. Strength is 5/5 throughout all four extremities.    Sensory  Light touch is normal in upper and lower extremities.     Coordination  Right: Finger-to-nose normal.Left: Finger-to-nose normal.    Gait    Deferred.       Assessment & Plan  , AND EDUCATION/COUNSELING  This is a 28 y.o. female who presents to the EMU for reasons detailed elsewhere. I had an extensive discussion with the patient about the purpose of the admission. Discussed the purpose of provocative maneuvers such as photic stimulation, hyperventilation, and/or sleep deprivation which may unmask epileptic activity. We discussed that it may be necessary to decrease, or discontinue altogether, any and all anti-seizure medications to achieve the goals of the admission. I explained that this places patient at higher risk for seizures and status epilepticus, a serious life-threatening emergency with the potential for serious injury or death. It is therefore critical to the patient's safety that he/she monitored in the epilepsy monitoring unit for multiple days to mitigate the risk for serious injury or death. Discussed that the EMU and its personnel mitigate risk as there are rescue medications on hand if needed for any prolonged or repetitive seizures; trained nursing staff, EEG technicians, and a board certified epileptologist available 24/7; and continuous EEG and video surveillance being monitoring live by trained personnel at all times.      Discussed the importance of maintaining seizure precautions at all times. Discussed the importance of notifying nursing staff by using the call button for any concerns or needs, especially to assist with ambulation or transtions into and  out of bed. Emphasized that the patient is at a higher for falling and potential subsequent injury due to the cumbersome equipment being worn, as well as other potential factors that may impair balance. It is therefore critical that the patient refrain from getting out of bed or walking without staff assistance.     Discussed the importance and rationale for DVT prophylaxis as well.     Finally, counseled the patient on using the push-button should she/he have any events concerning for seizure. Encourage visitors to press the button as well if a seizure or aura is witnessed and the patient is unable to press the button herself/himself.     Patient agreed to the above discussion. All questions/concerns were addressed.    PLAN:  Patient is being admitted to EMU to characterize new onset convulsions and altered mental status that need to be characterized on EEG. These are now impacting her job, causing physical injury, and degrading her quality of life. The convulsions are new since September 2023 and are different than her prior convulsions.  These clinical events need to be captured so that a diagnosis can be definiibely made and appropriate  treatments be given. I am suspicious for nonepileptic attacks vs mixed epileptic  and nonepileptic  attacks.      I asked that the patient stop taking her one antiseiszure medication (zonisimade 100 mg once per night) the Friday before (5/3/24) she is to be admitted to the EMU on the Monday (5/6/24). She did this as instructed.     On Day 1, no events were captured and EEG is normal thus far. She remains off of antiseizure medication since Friday 5/3. Will plan to provoked patient by getting her OOB and ambulatory.  Will also plan to repeat photic stimulation and hyperventilation      EVENTS IN QUESTIONS TO CAPTURE  Spells of sudden onset fear/panic->vision gets blurry->unable to speak  Number 1 above followed by loss of awareness->Convulsions.   more recent convulsion do not  appear to have an aura and are felt to be different to the patient   Spells with of loss of awareness with no aura  Continuous VEEG monitoring  Vitals and neurological checks as ordered  Telemetry  Admission labs:   Other diagnostics if applicable: NA  HV and PS to be performed on Days 1 as well as Day 2   Rescue Ativan Protocol:  Sleep deprivation: No  ANTISEIZURE REGIMEN:   DAY 1:  None . Patient has been off of antiseizure medication since 5/3/2024      Insomnia  -continue mirtazapine nightly    Depression, anxiety, psychosocial stressors  -continue home mirtazepine    EDGARD untreated  -will address this in the outpatient setting    Elevated carboxyhemoglobin  -Will plan to repeat lab in the morning of 5/8   -patient does report feeling always cold and needing to be close to a heater    OTHER ITEMS:  DVT Ppx: Lovenox and/or SCDs  DIET: Regular  Bowel regimen  PRN analgesics available for pain  PRN antiemetics available    CODE STATUS:   FULL CODE    BILLING DOCUMENTATION:     I spent a total of 40 minutes of face-to-face time in this visit. Over 50% of the time of the visit today was spent on counseling and/or coordination of care wtih the patient and/or family, as above in assessment in plan.    Pablo Johansen MD  Department of Neurology at [facility]  General Neurologist and Epileptologist  Director of [facility]'s Level III Comprehensive Epilepsy Program  Professor of Clinical Neurology, [facility].   Phone: [phone]  Fax: [phone]  E-mail: [email or web address]

## 2024-05-07 NOTE — CARE PLAN
The patient is Stable - Low risk of patient condition declining or worsening    Shift Goals  Clinical Goals: monitor for seizures, stable neuro assessments, stable vital signs  Patient Goals: monitor for seizures  Family Goals: updates    Patient is progressing towards the following goals:    Problem: Knowledge Deficit - Standard  Goal: Patient and family/care givers will demonstrate understanding of plan of care, disease process/condition, diagnostic tests and medications  Description: Target End Date:  1-3 days or as soon as patient condition allows    Document in Patient Education    1.  Patient and family/caregiver oriented to unit, equipment, visitation policy and means for communicating concern  2.  Complete/review Learning Assessment  3.  Assess knowledge level of disease process/condition, treatment plan, diagnostic tests and medications  4.  Explain disease process/condition, treatment plan, diagnostic tests and medications  Outcome: Progressing     Problem: Seizure Precautions  Goal: Implementation of seizure precautions  Description: Target End Date:  Prior to discharge or change in level of care    1.  Padded side rails up at all times  2.  Suction equipment and oxygen delivery system at bedside  3.  Continuous pulse oximeter in use  4.  Implement fall precautions, bed alarm on, bed in lowest position  5.  IV access (per order)  6.  Provide low stimulus environment, avoid exposure to triggers  7.  Instruct patient to use call light/seizure button if having warning signs of impending seizure  Outcome: Progressing     Problem: Seizure EEG Monitoring  Goal: Appropriate Monitoring of EEG patient  Description: Target End Date:  Prior to discharge or change in level of care    1.  Ensure patient in view of camera at all times, do not block camera, curtains are pulled, door remains open  2.  May be off camera for bathroom privileges with assistance from staff, do not leave patient in bathroom unattended  3.   Face to face evaluation every 10 minutes or continuous video monitoring by jayant  4.  Review order for remote cardiac monitoring  5.  Press event button, observe and document events  Outcome: Progressing     Problem: Physical Regulation  Goal: Decrease in complications related to the disease process, condition or treatment  Description: Target End Date:  Prior to discharge or change in level of care    Document on Assessment flowsheet    1.  Monitor response to treatments  2.  Monitor diagnostic test results  3.  Monitor vital signs appropriately during seizure and post ictal phase  4.  Ensure electrodes are not dislodged from the patient's scalp or chest  5.  During seizure/ictal stage: turn patient to side lying position, suction as needed, administer oxygen and medications as ordered  6.  After seizure/post-ictal stage: assess airway, breathing, and circulation, perform neurological assessment, notify RRT/provider as appropriate  Outcome: Progressing     Problem: Medication  Goal: Risk for seizure medication side effects will decrease  Description: Target End Date:  Prior to discharge or change in level of care    1.  Monitor for potential side effects of medications  2.  Monitor for signs and symptoms of medication toxicity  3.  Monitor lab values for medications for toxicity and subtherapeutic levels  Outcome: Progressing     Problem: Knowledge Deficit - Seizures  Goal: Knowledge of seizure treatment regimen will improve  Description: Target End Date:  1-3 days or as soon as patient condition allows    Document in Patient Education    1.  Educate patient and family/caregiver of importance of seizure precautions in hospital  2.  Educate patient and family/caregiver to report auras and triggers prior to events/ EMU event reporting  3.  Review anticonvulsant medication regimen and side effects  Outcome: Progressing

## 2024-05-07 NOTE — PROGRESS NOTES
4 Eyes Skin Assessment Completed by MOE Moore and MOE Phillips.    Head WDL  Ears WDL  Nose WDL  Mouth WDL  Neck WDL  Breast/Chest WDL  Shoulder Blades WDL  Spine WDL  (R) Arm/Elbow/Hand WDL  (L) Arm/Elbow/Hand WDL  Abdomen WDL  Groin WDL  Scrotum/Coccyx/Buttocks WDL  (R) Leg WDL  (L) Leg WDL  (R) Heel/Foot/Toe WDL  (L) Heel/Foot/Toe WDL          Devices In Places Tele Box, Pulse Ox, and SCD's      Interventions In Place Pillows and Pressure Redistribution Mattress    Possible Skin Injury No    Pictures Uploaded Into Epic N/A  Wound Consult Placed N/A  RN Wound Prevention Protocol Ordered No

## 2024-05-07 NOTE — PROCEDURES
VIDEO ELECTROENCEPHALOGRAM REPORT - EPILEPSY MONITORING UNIT (EMU)     REFERRING PROVIDER: Dr. Johansen  DOS: 5/7/2024   ROOM: S181/00   TOTAL RECORDING TIME: 24 hours and 00 minutes of total recording time    INDICATION:  Orin Rodas 28 y.o. female presenting with seizure(s)    RELEVANT MEDICATIONS/TREATMENTS:   No AEDs     TECHNIQUE:   CVEEG was set up by a Neurodiagnostic technologist who performed education to the patient and staff. A minimum of 23 electrodes and 23 channel recording was setup and performed by Neurodiagnostic technologist, in accordance with the international 10-20 system. Impedence, electrode integrity, and technical impressions were documented a minimum of every 2-24 hour period by a Neurodiagnostic Technologist and reviewed by Interpreting Physician. The study was reviewed in bipolar and referential montages. The recording examined the patient in the awake, drowsy, and sleep state(s).     DESCRIPTION OF THE RECORD:  During wakefulness, the background was continuous and showed a 9-10 Hz posterior dominant rhythm.  There was reactivity to eye closure/opening.  A normal anterior-posterior gradient was noted with faster beta frequencies seen anteriorly.  During drowsiness, theta/delta frequencies were seen.    Sleep was captured and was characterized by diffuse background delta/theta activity with a loss of myogenic artifact.  N2 sleep transients in the form of sleep spindles and vertex waves were seen in the leads over the central regions.     ICTAL AND INTERICTAL FINDINGS:   No focal or generalized epileptiform activity noted.     No regional slowing or persistent focal asymmetries were seen.    No seizures    ACTIVATION PROCEDURES:   NA    EKG: Sampling of the EKG recording showed sinus rhythm    EVENTS:    No clinical events were captured or reported.    INTERPRETATION:  Normal video EEG recording in the awake, drowsy, and sleep state(s):  -No seizures.   -No persistent focal or  regional slowing and no background asymmetries were seen.   -No definitive epileptiform discharges were seen.  -No events  -Note: A normal EEG does not rule out a diagnosis of epilepsy, as epilepsy is a clinical diagnosis  -Compared to the prior study, no major differences were seen      Pablo Johansen MD  Department of Neurology at [facility]  General Neurologist and Epileptologist  Director of [facility]'s Level III Comprehensive Epilepsy Program  Professor of Clinical Neurology, [facility].   Phone: [phone]  Fax: [phone]  E-mail: [email or web address]

## 2024-05-08 LAB — COHGB MFR BLD: 1.9 % (ref 0–4.9)

## 2024-05-08 PROCEDURE — 4A10X4Z MONITORING OF CENTRAL NERVOUS ELECTRICAL ACTIVITY, EXTERNAL APPROACH: ICD-10-PCS | Performed by: STUDENT IN AN ORGANIZED HEALTH CARE EDUCATION/TRAINING PROGRAM

## 2024-05-08 PROCEDURE — 99233 SBSQ HOSP IP/OBS HIGH 50: CPT | Performed by: STUDENT IN AN ORGANIZED HEALTH CARE EDUCATION/TRAINING PROGRAM

## 2024-05-08 PROCEDURE — 95720 EEG PHY/QHP EA INCR W/VEEG: CPT | Performed by: STUDENT IN AN ORGANIZED HEALTH CARE EDUCATION/TRAINING PROGRAM

## 2024-05-08 RX ORDER — ZONISAMIDE 100 MG/1
400 CAPSULE ORAL
Status: DISCONTINUED | OUTPATIENT
Start: 2024-05-08 | End: 2024-05-09 | Stop reason: HOSPADM

## 2024-05-08 RX ADMIN — ENOXAPARIN SODIUM 40 MG: 100 INJECTION SUBCUTANEOUS at 17:46

## 2024-05-08 RX ADMIN — MIRTAZAPINE 30 MG: 15 TABLET, FILM COATED ORAL at 17:46

## 2024-05-08 RX ADMIN — ZONISAMIDE 400 MG: 100 CAPSULE ORAL at 22:26

## 2024-05-08 RX ADMIN — SENNOSIDES AND DOCUSATE SODIUM 2 TABLET: 50; 8.6 TABLET ORAL at 17:46

## 2024-05-08 ASSESSMENT — PAIN DESCRIPTION - PAIN TYPE
TYPE: ACUTE PAIN

## 2024-05-08 NOTE — PROCEDURES
VIDEO ELECTROENCEPHALOGRAM REPORT - EPILEPSY MONITORING UNIT (EMU)     REFERRING PROVIDER: Dr. Johansen  DOS: 5/8/2024   ROOM: S181/00   TOTAL RECORDING TIME: 24 hours and 00 minutes of total recording time    INDICATION:  Orin Rodas 28 y.o. female presenting with seizure(s)    RELEVANT MEDICATIONS/TREATMENTS:   Zonisamide 400 mg QHS    TECHNIQUE:   CVEEG was set up by a Neurodiagnostic technologist who performed education to the patient and staff. A minimum of 23 electrodes and 23 channel recording was setup and performed by Neurodiagnostic technologist, in accordance with the international 10-20 system. Impedence, electrode integrity, and technical impressions were documented a minimum of every 2-24 hour period by a Neurodiagnostic Technologist and reviewed by Interpreting Physician. The study was reviewed in bipolar and referential montages. The recording examined the patient in the awake, drowsy, and sleep state(s).     DESCRIPTION OF THE RECORD:  During wakefulness, the background was continuous and showed a 9-10 Hz posterior dominant rhythm.  There was reactivity to eye closure/opening.  A normal anterior-posterior gradient was noted with faster beta frequencies seen anteriorly.  During drowsiness, theta/delta frequencies were seen.    Sleep was captured and was characterized by diffuse background delta/theta activity with a loss of myogenic artifact.  N2 sleep transients in the form of sleep spindles and vertex waves were seen in the leads over the central regions.     ICTAL AND INTERICTAL FINDINGS:   No focal or generalized epileptiform activity noted.     No regional slowing or persistent focal asymmetries were seen.    No seizures    ACTIVATION PROCEDURES:   NA    EKG: Sampling of the EKG recording showed sinus rhythm    EVENTS:    No clinical events were captured or reported.    INTERPRETATION:  Normal video EEG recording in the awake, drowsy, and sleep state(s):  -No seizures.   -No persistent  focal or regional slowing and no background asymmetries were seen.   -No definitive epileptiform discharges were seen.  -No events  -Note: A normal EEG does not rule out a diagnosis of epilepsy, as epilepsy is a clinical diagnosis  -Compared to the prior study, this one is similar      Pablo Johansen MD  Department of Neurology at [facility]  General Neurologist and Epileptologist  Director of [facility]'s Level III Comprehensive Epilepsy Program  Professor of Clinical Neurology, [facility].   Phone: [phone]  Fax: [phone]  E-mail: [email or web address]

## 2024-05-08 NOTE — PROGRESS NOTES
Monitor summary: SR/ST , [place] -0.13, QRS -0.10, QT -0.34, with frequent PVCs per strip from the monitor room.

## 2024-05-08 NOTE — PROGRESS NOTES
Monitor summary: SB/SR 56-77, CT 0.15, QRS 0.08, QT 0.35, with rare PVCs per strip from monitor room.

## 2024-05-08 NOTE — CARE PLAN
The patient is Stable - Low risk of patient condition declining or worsening    Shift Goals  Clinical Goals: monitor for seizures, stable neuro assessments, monitor heart rate/rhythm  Patient Goals: monitor for seizures  Family Goals: updates    Patient is progressing towards the following goals:    Problem: Knowledge Deficit - Standard  Goal: Patient and family/care givers will demonstrate understanding of plan of care, disease process/condition, diagnostic tests and medications  Description: Target End Date:  1-3 days or as soon as patient condition allows    Document in Patient Education    1.  Patient and family/caregiver oriented to unit, equipment, visitation policy and means for communicating concern  2.  Complete/review Learning Assessment  3.  Assess knowledge level of disease process/condition, treatment plan, diagnostic tests and medications  4.  Explain disease process/condition, treatment plan, diagnostic tests and medications  Outcome: Progressing     Problem: Seizure Precautions  Goal: Implementation of seizure precautions  Description: Target End Date:  Prior to discharge or change in level of care    1.  Padded side rails up at all times  2.  Suction equipment and oxygen delivery system at bedside  3.  Continuous pulse oximeter in use  4.  Implement fall precautions, bed alarm on, bed in lowest position  5.  IV access (per order)  6.  Provide low stimulus environment, avoid exposure to triggers  7.  Instruct patient to use call light/seizure button if having warning signs of impending seizure  Outcome: Progressing     Problem: Seizure EEG Monitoring  Goal: Appropriate Monitoring of EEG patient  Description: Target End Date:  Prior to discharge or change in level of care    1.  Ensure patient in view of camera at all times, do not block camera, curtains are pulled, door remains open  2.  May be off camera for bathroom privileges with assistance from staff, do not leave patient in bathroom  unattended  3.  Face to face evaluation every 10 minutes or continuous video monitoring by sitter  4.  Review order for remote cardiac monitoring  5.  Press event button, observe and document events  Outcome: Progressing     Problem: Physical Regulation  Goal: Decrease in complications related to the disease process, condition or treatment  Description: Target End Date:  Prior to discharge or change in level of care    Document on Assessment flowsheet    1.  Monitor response to treatments  2.  Monitor diagnostic test results  3.  Monitor vital signs appropriately during seizure and post ictal phase  4.  Ensure electrodes are not dislodged from the patient's scalp or chest  5.  During seizure/ictal stage: turn patient to side lying position, suction as needed, administer oxygen and medications as ordered  6.  After seizure/post-ictal stage: assess airway, breathing, and circulation, perform neurological assessment, notify RRT/provider as appropriate  Outcome: Progressing     Problem: Medication  Goal: Risk for seizure medication side effects will decrease  Description: Target End Date:  Prior to discharge or change in level of care    1.  Monitor for potential side effects of medications  2.  Monitor for signs and symptoms of medication toxicity  3.  Monitor lab values for medications for toxicity and subtherapeutic levels  Outcome: Progressing     Problem: Knowledge Deficit - Seizures  Goal: Knowledge of seizure treatment regimen will improve  Description: Target End Date:  1-3 days or as soon as patient condition allows    Document in Patient Education    1.  Educate patient and family/caregiver of importance of seizure precautions in hospital  2.  Educate patient and family/caregiver to report auras and triggers prior to events/ EMU event reporting  3.  Review anticonvulsant medication regimen and side effects  Outcome: Progressing

## 2024-05-09 VITALS
SYSTOLIC BLOOD PRESSURE: 107 MMHG | TEMPERATURE: 98.4 F | WEIGHT: 158.07 LBS | BODY MASS INDEX: 31.93 KG/M2 | DIASTOLIC BLOOD PRESSURE: 74 MMHG | RESPIRATION RATE: 16 BRPM | OXYGEN SATURATION: 100 % | HEART RATE: 62 BPM

## 2024-05-09 PROCEDURE — 4A10X4Z MONITORING OF CENTRAL NERVOUS ELECTRICAL ACTIVITY, EXTERNAL APPROACH: ICD-10-PCS | Performed by: STUDENT IN AN ORGANIZED HEALTH CARE EDUCATION/TRAINING PROGRAM

## 2024-05-09 PROCEDURE — 95718 EEG PHYS/QHP 2-12 HR W/VEEG: CPT | Performed by: STUDENT IN AN ORGANIZED HEALTH CARE EDUCATION/TRAINING PROGRAM

## 2024-05-09 PROCEDURE — 99239 HOSP IP/OBS DSCHRG MGMT >30: CPT | Performed by: STUDENT IN AN ORGANIZED HEALTH CARE EDUCATION/TRAINING PROGRAM

## 2024-05-09 RX ORDER — MIDAZOLAM 5 MG/.1ML
5 SPRAY NASAL PRN
Qty: 4 EACH | Refills: 0 | Status: SHIPPED | OUTPATIENT
Start: 2024-05-09 | End: 2024-06-08

## 2024-05-09 RX ORDER — CLONAZEPAM 0.5 MG/1
0.5 TABLET ORAL
Qty: 30 TABLET | Refills: 0 | Status: SHIPPED | OUTPATIENT
Start: 2024-05-09 | End: 2024-06-08

## 2024-05-09 RX ORDER — ZONISAMIDE 100 MG/1
400 CAPSULE ORAL EVERY EVENING
Qty: 120 CAPSULE | Refills: 11 | Status: SHIPPED | OUTPATIENT
Start: 2024-05-09 | End: 2025-05-04

## 2024-05-09 RX ADMIN — SENNOSIDES AND DOCUSATE SODIUM 2 TABLET: 50; 8.6 TABLET ORAL at 06:05

## 2024-05-09 ASSESSMENT — PAIN DESCRIPTION - PAIN TYPE: TYPE: ACUTE PAIN

## 2024-05-09 ASSESSMENT — FIBROSIS 4 INDEX: FIB4 SCORE: 0.33

## 2024-05-09 NOTE — PROGRESS NOTES
NEUROLOGY EMU PROGRESS NOTE 5/8/2024     REFERRING PROVIDER: Dr. Johansen    ID:   Patient is being admitted to EMU to characterize new onset convulsions and altered mental status that need to be characterized on EEG. She has ongoing disabling spells that are impacting her job, causing physical injury, and degrading her quality of life. These clinical events need to be captured so that a diagnosis can be definiibely made and appropraite treatments be given. Differential diagnosis includes nonepileptic attacks vs mixed epileptic  and nonepileptic  attacks.     INTERVAL HISTORY:       Patient with no complaints.  She slept a lot yesterday.  EEG remains normal.  No clinical events.  No seizures.      Patient's carboxyhemoglobin was repeated today and levels normalized.  Had an extensive discussion with the patient regarding potential causes which include having heater or stove on in a confined area, smoking/tobacco use especially cigars and Hooka.  Recommend getting a carbon oxide detector or, if they already have one, saying that it is working properly.  Had extensive discussion about signs and symptoms that can be associated with carbon monoxide poisoning including but not limited to confusion, palpitations, chest pain, headaches, excessive fatigue, seizures,    Patient's significant other was here today and was updated.  Discussed resuming zonisamide at her higher dose than what she came in on.  Specifically, increasing zonisamide from 100 twice a day to 400 mg at night.  Discussed the fact that zonisamide has a long half-life and can be taken once a day which will be easier for her.  Also further clarification of her medication history revealed that she had been on zonisamide total daily dose 400 mg before and it was during this time where she did not have any seizures for over a year.  She had self lowered her zonisamide some point last year to her the current dose she had been on prior to admission of 100 mg twice  per day because she could not afford all the pills that came along with taking 400 mg/day.    Also also discussed the potential that some of her seizures/convulsions may be psychogenic in nature.  She was receptive to this possibility.  She is seeing a therapist a few times per week to help with psychosocial stressors, anxiety, depression.  However, she has not done any cognitive behavioral therapy which is a form of psychotherapy that can be very effective for psychogenic attacks and dissociative spells    Also had an extensive discussion about the fact that she has sleep apnea and used to wear CPAP couple years ago but stopped using her mask for over a year.  She is interested in reestablishing care with a sleep specialist    I also spent 10 minutes getting patient up and out of bed, ambulating, doing physical exercises and attempt to provoke her symptoms but to no avail.    INPATIENT MED LIST:   enoxaparin (LOVENOX) injection, 40 mg, DAILY AT 1800  senna-docusate, 2 Tablet, BID  nicotine, 21 mg, Daily-0600  mirtazapine, 30 mg, Q EVENING        PHYSICAL EXAM:   /74   Pulse 86   Temp 36.2 °C (97.2 °F) (Temporal)   Resp 17   Wt 72.6 kg (160 lb)   SpO2 99%     Physical Exam  Constitutional:       General: She is not in acute distress.  HENT:      Head: Normocephalic.   Eyes:      Extraocular Movements: EOM normal. No nystagmus.   Neurological:      Mental Status: She is alert.      Motor: Motor strength is normal.  Psychiatric:         Speech: Speech normal.        NEUROLOGICAL EXAM:   Neurological Exam  Mental Status  Alert. Speech is normal. Language is fluent with no aphasia. Attention and concentration are normal.    Cranial Nerves  CN III, IV, VI: Extraocular movements intact bilaterally. No nystagmus. Normal saccades. Normal smooth pursuit.  CN V: Facial sensation is normal.  CN VII: Full and symmetric facial movement.  CN IX, X: Palate elevates symmetrically  CN XI: Shoulder shrug strength is  normal.  CN XII: Tongue midline without atrophy or fasciculations.    Motor  Normal muscle bulk throughout. No fasciculations present. Normal muscle tone. No abnormal involuntary movements. Strength is 5/5 throughout all four extremities.    Sensory  Light touch is normal in upper and lower extremities.     Coordination  Right: Finger-to-nose normal.Left: Finger-to-nose normal.    Gait  Casual gait is normal including stance, stride, and arm swing.       Assessment & Plan  , AND EDUCATION/COUNSELING  This is a 28 y.o. female who presents to the EMU for reasons detailed elsewhere. I had an extensive discussion with the patient about the purpose of the admission. Discussed the purpose of provocative maneuvers such as photic stimulation, hyperventilation, and/or sleep deprivation which may unmask epileptic activity. We discussed that it may be necessary to decrease, or discontinue altogether, any and all anti-seizure medications to achieve the goals of the admission. I explained that this places patient at higher risk for seizures and status epilepticus, a serious life-threatening emergency with the potential for serious injury or death. It is therefore critical to the patient's safety that he/she monitored in the epilepsy monitoring unit for multiple days to mitigate the risk for serious injury or death. Discussed that the EMU and its personnel mitigate risk as there are rescue medications on hand if needed for any prolonged or repetitive seizures; trained nursing staff, EEG technicians, and a board certified epileptologist available 24/7; and continuous EEG and video surveillance being monitoring live by trained personnel at all times.      Discussed the importance of maintaining seizure precautions at all times. Discussed the importance of notifying nursing staff by using the call button for any concerns or needs, especially to assist with ambulation or transtions into and out of bed. Emphasized that the patient is  at a higher for falling and potential subsequent injury due to the cumbersome equipment being worn, as well as other potential factors that may impair balance. It is therefore critical that the patient refrain from getting out of bed or walking without staff assistance.     Discussed the importance and rationale for DVT prophylaxis as well.     Finally, counseled the patient on using the push-button should she/he have any events concerning for seizure. Encourage visitors to press the button as well if a seizure or aura is witnessed and the patient is unable to press the button herself/himself.     Patient agreed to the above discussion. All questions/concerns were addressed.    PLAN:  Patient is being admitted to EMU to characterize new onset convulsions and altered mental status that need to be characterized on EEG. These are now impacting her job, causing physical injury, and degrading her quality of life. The convulsions are new since September 2023 and are different than her prior convulsions.  These clinical events need to be captured so that a diagnosis can be definiibely made and appropriate  treatments be given. I am suspicious for nonepileptic attacks vs mixed epileptic  and nonepileptic  attacks.      I asked that the patient stop taking her one antiseiszure medication (zonisimade 100 mg once per night) the Friday before (5/3/24) she is to be admitted to the EMU on the Monday (5/6/24). She did this as instructed.     No events have been captured so far and EEG remains normal. She remains off of antiseizure medication since Friday 5/3. I tried to provoked symptoms to no avail by getting her OOB, ambulating, and doing physical exercises at bedside. Attacks were ambulatory.      Will restart higher dose of zonisamide tonight and plan to discharge tomorrow morning patient/EEG remain stable. Further details on plan are outlined below, with items highlighted in red being the most recent changes to plan        EVENTS IN QUESTIONS TO CAPTURE  Spells of sudden onset fear/panic->vision gets blurry->unable to speak  Number 1 above followed by loss of awareness->Convulsions.   more recent convulsion do not appear to have an aura and are felt to be different to the patient   Spells with of loss of awareness with no aura  Continuous VEEG monitoring  Vitals and neurological checks as ordered  Telemetry  Admission labs:   Other diagnostics if applicable: carboxyhemoglobin levels ordered on admission and again on 5/8   HV and PS to be performed on Days 1 as well as Day 2   Rescue Ativan Protocol:  Sleep deprivation: No  ANTISEIZURE REGIMEN:   DAY 1-3:  None . Patient has been off of antiseizure medication since 5/3/2024  Resuming zonisamde evening of 05/08/24 at a higher dose of 400 mg per night (which she has been on previously and tolerated well and found to be effective at controlling her seizures)  Prescribing rescue Nayzilam upon discharge  Also discussed trial of as needed clonazepam to be taken in situations where she might be at higher risk for having a seizure (such as a long, hot stressful work day)   Patient instructed to keep a diary of her seizures/attacks  Will plan to discharge Thursday  late morning 5/9/24      Insomnia  -continue mirtazapine nightly    Depression, anxiety, psychosocial stressors; possible dissociated spells; 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  -continue home mirtazepine  -patient does see a therapist multiple times per week; however, she has not undergone cognitive behavioral therapy (CBT); patient is iterested in cognitive behavioral therapy with a therapist trained in this; referral to be placed at discharge    EDGARD untreated  -used to wear CPAP but is no longer  -She needs re-referral to sleep medicine to re-establish care    Elevated carboxyhemoglobin  -Lab repeated 5/8 and normalized  -had an extensive discussion with the patient regarding potential causes which include having heater  or stove on in a confined area, smoking/tobacco use especially cigars and Hooka.    -Recommend getting a carbon oxide detector or, if they already have one, saying that it is working properly.    -Had extensive discussion about signs and symptoms that can be associated with carbon monoxide poisoning including but not limited to confusion, palpitations, chest pain, headaches, excessive fatigue, seizures,    OTHER ITEMS:  DVT Ppx: Lovenox and/or SCDs  DIET: Regular  Bowel regimen  PRN analgesics available for pain  PRN antiemetics available    CODE STATUS:   FULL CODE    BILLING DOCUMENTATION:     I spent a total of 80 minutes of face-to-face time in this visit. Over 50% of the time of the visit today was spent on counseling and/or coordination of care wtih the patient and/or family, as above in assessment in plan.    Pablo Johansen MD  Department of Neurology at [facility]  General Neurologist and Epileptologist  Director of [facility] Level III Comprehensive Epilepsy Program  Professor of Clinical Neurology, [facility].   Phone: [phone]  Fax: [phone]  E-mail: [email or web address]

## 2024-05-09 NOTE — PROCEDURES
VIDEO ELECTROENCEPHALOGRAM REPORT - EPILEPSY MONITORING UNIT (EMU)     REFERRING PROVIDER: Dr. Johansen  DOS: 5/9/2024   ROOM: [facility]/00   TOTAL RECORDING TIME: 2 hours and 12 minutes of total recording time    INDICATION:  Orin Rodas 28 y.o. female presenting with seizure(s)    RELEVANT MEDICATIONS/TREATMENTS:   Zonisamide 400 mg QHS    TECHNIQUE:   CVEEG was set up by a Neurodiagnostic technologist who performed education to the patient and staff. A minimum of 23 electrodes and 23 channel recording was setup and performed by Neurodiagnostic technologist, in accordance with the international 10-20 system. Impedence, electrode integrity, and technical impressions were documented a minimum of every 2-24 hour period by a Neurodiagnostic Technologist and reviewed by Interpreting Physician. The study was reviewed in bipolar and referential montages. The recording examined the patient in the awake and drowsy state(s).     DESCRIPTION OF THE RECORD:  During wakefulness, the background was continuous and showed a 9-10 Hz posterior dominant rhythm.  There was reactivity to eye closure/opening.  A normal anterior-posterior gradient was noted with faster beta frequencies seen anteriorly.  During drowsiness, theta/delta frequencies were seen.    Sleep was not seen    ICTAL AND INTERICTAL FINDINGS:   No focal or generalized epileptiform activity noted.     No regional slowing or persistent focal asymmetries were seen.    No seizures    ACTIVATION PROCEDURES:   NA    EKG: Sampling of the EKG recording showed sinus rhythm    EVENTS:    No clinical events were captured or reported.    INTERPRETATION:  Normal video EEG recording in the awake, drowsy  state(s):  -No seizures.   -No persistent focal or regional slowing and no background asymmetries were seen.   -No definitive epileptiform discharges were seen.  -No events  -Note: A normal EEG does not rule out a diagnosis of epilepsy, as epilepsy is a clinical  diagnosis  -Compared to the prior study, this study is unchanged      Pablo Johansen MD  Department of Neurology at [facility]  General Neurologist and Epileptologist  Director of [facility]'s Level III Comprehensive Epilepsy Program  Professor of Clinical Neurology, [facility].   Phone: [phone]  Fax: [phone]  E-mail: [email or web address]

## 2024-05-09 NOTE — CARE PLAN
The patient is Stable - Low risk of patient condition declining or worsening    Shift Goals  Clinical Goals: Stable neuro status, seizure activity  Patient Goals: Capture a seizure  Family Goals: VENKATESH    Progress made toward(s) clinical / shift goals:    Problem: Seizure Precautions  Goal: Implementation of seizure precautions  Description: Target End Date:  Prior to discharge or change in level of care    1.  Padded side rails up at all times  2.  Suction equipment and oxygen delivery system at bedside  3.  Continuous pulse oximeter in use  4.  Implement fall precautions, bed alarm on, bed in lowest position  5.  IV access (per order)  6.  Provide low stimulus environment, avoid exposure to triggers  7.  Instruct patient to use call light/seizure button if having warning signs of impending seizure  Outcome: Progressing     Problem: Seizure EEG Monitoring  Goal: Appropriate Monitoring of EEG patient  Description: Target End Date:  Prior to discharge or change in level of care    1.  Ensure patient in view of camera at all times, do not block camera, curtains are pulled, door remains open  2.  May be off camera for bathroom privileges with assistance from staff, do not leave patient in bathroom unattended  3.  Face to face evaluation every 10 minutes or continuous video monitoring by jayant  4.  Review order for remote cardiac monitoring  5.  Press event button, observe and document events  Outcome: Progressing       Patient is not progressing towards the following goals:

## 2024-05-09 NOTE — CARE PLAN
The patient is Stable - Low risk of patient condition declining or worsening    Shift Goals  Clinical Goals: Monitor for seizures, safety  Patient Goals: Sleep  Family Goals: VENKATESH    Progress made toward(s) clinical / shift goals: Pt is A/Ox4, no acute signs of pain or distress. Fall, aspiration, and seizure precautions are in place. Pt ambulates with a standby assistance up to the bathroom, bed alarm is on, call light and belongings within reach, all four side rails are up and padded, O2 and suction are available at the bedside. Pt updated on POC, verbalized understanding.      Problem: Seizure Precautions  Goal: Implementation of seizure precautions  Outcome: Progressing  Note: 1. Padded side rails up at all times   2. Suction equipment and oxygen delivery system at bedside   3. Continuous pulse oximeter in use   4. Implement fall precautions, bed alarm on, bed in lowest position   5. IV access (per order)   6. Provide low stimulus environment, avoid exposure to triggers   7. Instruct patient to use call light/seizure button if having warning signs of impending seizure      Problem: Seizure EEG Monitoring  Goal: Appropriate Monitoring of EEG patient  Outcome: Progressing     Problem: Knowledge Deficit - Standard  Goal: Patient and family/care givers will demonstrate understanding of plan of care, disease process/condition, diagnostic tests and medications  Outcome: Progressing       Patient is not progressing towards the following goals:

## 2024-05-11 NOTE — DISCHARGE SUMMARY
EMU Discharge Summary    ADMISSION DATE:   5/6/2024  6:15 AM  DISCHARGE DATE:   5/9/2024 10:56 AM  REFERRING PROVIDER: Dr. Johansen  REASON(S) FOR ADMISSION: Characterization of paroxysmal events and Medication titration/optimization   CODE STATUS: Full Code    FOLLOW-UP ITEMS AFTER DISCHARGE AND POST DISCHARGE RECOMMENDATIONS:  Follow-up with Dr. Johansen in neurology/epilepsy clinic  Follow-up with new referral to a psychologist trained in cognitive behavioral therapy (CBT)  Patient has been instructed to keep a diary of her symptoms  Patient has a history of sleep apnea and is currently not on CPAP. A re-referral to sleep medicine has been placed so that she can re-establish care and address her untreated sleep apnea  Patient blood work on the day of admission was notable for elevated carboxyhemoglobin levels suggesting carbon monoxide poisoning. The blood work was repeated in 48 hours, showing that the levels in her blood normalize. Discussed potential causes including being in a poorly ventilated area while a heater or oven is left on; poorly ventilated area while exposed to motor vehicle emission; excessive smoking of cigarettes, cigars, and/or Hooka    MEDICATIONS ON DISCHARGE:      Medication List        START taking these medications        Instructions   clonazePAM 0.5 MG Tabs  Commonly known as: KlonoPIN   Doctor's comments: Request 30 tabs of 0.5 mg to cover 30 days.  Take 1 Tablet by mouth 1 time a day as needed (seizures) for up to 30 days. Indications: seizures  Dose: 0.5 mg     Nayzilam 5 MG/0.1ML Soln  Generic drug: Midazolam   Doctor's comments: Request 4 spray bottles of 5 mg each to cover 30 days  Administer 5 mg into affected nostril(S) as needed (Give 5 mg as 1 spray in 1 nostril; repeat in 10 min in alternate nostril if needed. maximum dose: 10 mg/dose per episode (2 sprays);) for up to 30 days. Indications: Seizure  Dose: 5 mg            CHANGE how you take these medications        Instructions    zonisamide 100 MG Caps  What changed:   how much to take  additional instructions  Commonly known as: Zonegran   Take 4 Capsules by mouth every evening for 360 days.  Dose: 400 mg            CONTINUE taking these medications        Instructions   folic acid 1 MG Tabs  Commonly known as: Folvite   Take 1 Tablet by mouth every day.  Dose: 1 mg     Magnesium 250 MG Tabs   Take 250 mg by mouth every day.  Dose: 250 mg     mirtazapine 30 MG Tabs tablet  Commonly known as: Remeron   Take 1 Tablet by mouth every evening for 180 days.  Dose: 30 mg     valACYclovir 500 MG Tabs  Commonly known as: Valtrex   Take 500 mg by mouth every day.  Dose: 500 mg     vitamin D3 1000 Unit (25 mcg) Tabs  Commonly known as: Cholecalciferol   Take 2,000 Units by mouth every day. 2 tablets = 50 mcg = 2,000 IU.  Dose: 2,000 Units     ZINCATE PO   Take 1 Tablet by mouth every day.  Dose: 1 Tablet              DISCHARGE DIAGNOSE(S):  Problem List Items Addressed This Visit       Depression (Chronic)    Relevant Orders    Referral to Psychology    Obstructive sleep apnea syndrome (Chronic)    Relevant Orders    Referral to Pulmonary and Sleep Medicine    Primary insomnia (Chronic)    Seizure (HCC) (Chronic)    Relevant Medications    zonisamide (ZONEGRAN) 100 MG Cap    clonazePAM (KLONOPIN) 0.5 MG Tab    Midazolam (NAYZILAM) 5 MG/0.1ML Solution     Other Visit Diagnoses       Prophylactic measure        Pain        Convulsions, unspecified convulsion type (HCC)        Relevant Medications    zonisamide (ZONEGRAN) 100 MG Cap    clonazePAM (KLONOPIN) 0.5 MG Tab    Midazolam (NAYZILAM) 5 MG/0.1ML Solution    History of syncope        Nausea and vomiting, unspecified vomiting type        Current occasional smoker        Seizures (HCC)        Relevant Medications    zonisamide (ZONEGRAN) 100 MG Cap    clonazePAM (KLONOPIN) 0.5 MG Tab    Midazolam (NAYZILAM) 5 MG/0.1ML Solution    Psychosocial stressors        Relevant Orders    Referral to  Psychology    Anxiety        Relevant Orders    Referral to Psychology             FOLLOW UP APPOINTMENTS:  Future Appointments         Provider Department Center    9/6/2024 4:20 PM (Arrive by 4:00 PM) Pablo [facility]ansen [facility]. [facility]              [facility], PRIOR WORK-UP, EXAM AS PER H&P FROM THIS ADMISSION:   Patient is being admitted to EMU to characterize new onset convulsions and altered mental status that need to be characterized on EEG. She has ongoing disabling spells that are impacting her job, causing physical injury, and degrading her quality of life. These clinical events need to be captured so that a diagnosis can be definiibely made and appropraite treatments be given. I am suspicious for nonepileptic attacks vs mixed epileptic  and nonepileptic  attacks.      I asked that the patient stop taking her one antiseiszure medication (zonisimade 100 mg once per night) the Friday before (5/3/24) she is to be admitted to the EMU on the Monday (5/6/24). She has therefore arrived on Monday off of all antiseizure medication for > 48 hours.     Will plan to include bilateral inferior temporal leads.     ------  Per my last night in March 2024, she had a convulsions at work in January 2024.  She fell, struck her head and was apparently post-ictal for 24 hours or more. She does not remember much about that day. No obvious prodrome or aura. This occurred in mid/early January (?) . No other major events since that one. She does have intermittent smaller events, sometimes associated with sudden onset anxiety/fear/panic->blurring of vision->unable to speak.     In her December 2023 appointment, she reported having a convulsions that seemed different, new starting in Sept of 2023 after a year with no seizures. Significant other showed me a video of her convulsions. It showed her body twitching, /shaking off and on and arrhythmically, grunting, eyes  were closed, lasted > 5 minutes. It appeared nonepileptic. Patient does  report significant stress in her life at the time as her sister just  and having a lot of work-related stress.     She has been working hard to maintain a healthier lifestyle. She has been eating healthier, has lost weight, has been practicing better sleep habits.     She has a diagnosis of EDGARD and this still is not being treated.     She has a history of headaches/migraines. In early  She was worked up for secondary headaches. LP with normal opening pressure. MRV head was normal as well     -------  Seizure history:     Seizures: 2015 started out with GTCs. Aura:. Unable to talk, becomes mute then GTC. Note this aura is the same symptoms as her current complaint above.. Since 2019 no GTCs. Between  and  had seizures 3 tmes per month. Once put on zonismaide they stopped.      Prior AEDs:  Keppra -   Lamictal  Onfi     PRIOR WORK-UP TO DATE:  EEG 24 hour ambulatory - 2016: WNL     5 day EMU stay in 2017: EEG was WNL and no events were captured     MRI Brain w/wo Oct 2016  1. Agenesis of the septum pellucidum.  2. No evidence of mass lesion, heterotopic gray matter, gross cortical dysplasia or mesial temporal sclerosis.  3. There are rare scattered punctate areas of of increased T2 signal intensity in the periventricular and subcortical white matter consistent with areas of chronic microvascular ischemic change, demyelination, or gliosis.    Physical Exam on admission  Constitutional:       General: She is not in acute distress.  HENT:      Head: Normocephalic.   Eyes:      Extraocular Movements: EOM normal. No nystagmus.   Neurological:      Mental Status: She is alert.      Motor: Motor strength is normal.  Psychiatric:         Speech: Speech normal.         NEUROLOGICAL EXAM on admission  Neurological Exam  Mental Status  Alert. Speech is normal. Language is fluent with no aphasia. Attention and concentration are normal.     Cranial Nerves  CN III, IV, VI: Extraocular movements intact  bilaterally. No nystagmus. Normal saccades. Normal smooth pursuit.  CN V: Facial sensation is normal.  CN VII: Full and symmetric facial movement.  CN IX, X: Palate elevates symmetrically  CN XI: Shoulder shrug strength is normal.  CN XII: Tongue midline without atrophy or fasciculations.     Motor  Normal muscle bulk throughout. No fasciculations present. Normal muscle tone. No abnormal involuntary movements. Strength is 5/5 throughout all four extremities.     Sensory  Light touch is normal in upper and lower extremities.      Coordination  Right: Finger-to-nose normal.Left: Finger-to-nose normal.     Gait     Deferred.    HOSPITAL COURSE:  Patient was admitted on Monday and had an unremarkable/uneventful admission despite coming in have not been sleep deprived all weekend and have not stopped her antiseizure medication Friday before her admission (she was taking zonisamide 100 mg twice per day).  Her EEG was normal throughout the entirety of her stay.  She had no clinical events.  Ultimately, while it cannot say for certainty that her convulsions are epileptic in nature we discussed and agreed upon the best course of action would be to resume zonisamide at a higher dose of 400 mg/day at night which she has taken in the past and which has resulted in seizure freedom of more than a year.  I have also sent rescue benzodiazepine to her pharmacy as well and to be used as needed only.    Discussed the fact that she is under a lot of stress, and does suffer anxiety and depressive like symptoms.  We also discussed the possibility that her convulsions may be stress-induced (or she has mixed epileptic and nonepileptic convulsions).  She is seeing a therapist but she is interested in seeing a different therapist who is well versed and cognitive behavioral therapy (CBT), which might prove very beneficial for her.  Therefore I placed a referral to psychology.    Also discussed the fact that she needs to reestablish care with  sleep medicine as she has a diagnosis of sleep apnea and has not been able to use CPAP for quite some time now.  Referral to sleep medicine placed.    Finally, discussed her elevated carboxyhemoglobin levels which suggest carbon monoxide poisoning asymptomatic.  Discussed multiple causes including but not limited to being in a poorly ventilated room with a heater and/or stove, excessive cigarette, cigar, hookah smoking, being in a poorly ventilated garage with running automobiles, etc.  Advised that she monitor her symptoms very carefully as they can be nonspecific.  Carbon oxide poisoning symptoms include confusion, altered mentation, depressed level of consciousness, headache, chest pain, palpitations, dizziness, seizures, extreme fatigue and malaise, shortness of breath.  Recommend that she check her carbon monoxide detectors as well.    EEG SUMMARY:   EVENTS:    No clinical events were captured or reported.     INTERPRETATION:  Normal video EEG recording in the awake, drowsy  state(s) throughout the 4 day EMU stay:  -No seizures.   -No persistent focal or regional slowing and no background asymmetries were seen.   -No definitive epileptiform discharges were seen.  -No clinical events were captured at any point during the admission  -      EXAM ON DATE OF DISCHARGE: Unchanged from admission exam    Therefore,  patient is discharged in good and stable condition to home with close outpatient follow-up.    The patient met 2-midnight criteria for an inpatient stay at the time of discharge.    PROCEDURES:   Long-Term Video EEG    RECOMMENDATIONS FROM CONSULTANTS IF APPLICABLE:   NA    POST-DISCHARGE ACTIVITY RECOMMENDATIONS:  As tolerated.  Weight bearing as tolerated    BILLING DOCUMENTATION:  Total time of the discharge process exceeded 50 minutes.    Pablo Johansen MD  Department of Neurology at [facility]  Diplomate of the American Board of Psychiatry and Neurology, General  Neurology  Diplomate of American Board of Psychiatry and Neurology, a Member Board of the American Board of Medical Subspecialties, Epilepsy  Director of [facility] Level III Comprehensive Epilepsy Program  Professor of Clinical Neurology, [facility].   Number: [phone]  Fax: [phone]  E-mail: [email or web address]

## 2024-06-24 ENCOUNTER — TELEPHONE (OUTPATIENT)
Dept: HEALTH INFORMATION MANAGEMENT | Facility: OTHER | Age: 29
End: 2024-06-24
Payer: COMMERCIAL

## 2024-07-29 DIAGNOSIS — F32.A DEPRESSION, UNSPECIFIED DEPRESSION TYPE: ICD-10-CM

## 2024-07-29 DIAGNOSIS — G47.00 INSOMNIA, UNSPECIFIED TYPE: ICD-10-CM

## 2024-07-30 RX ORDER — MIRTAZAPINE 15 MG/1
15 TABLET, FILM COATED ORAL EVERY EVENING
Qty: 90 TABLET | Refills: 3 | Status: SHIPPED | OUTPATIENT
Start: 2024-07-30 | End: 2025-07-25